# Patient Record
Sex: MALE | Race: WHITE | NOT HISPANIC OR LATINO | Employment: OTHER | URBAN - METROPOLITAN AREA
[De-identification: names, ages, dates, MRNs, and addresses within clinical notes are randomized per-mention and may not be internally consistent; named-entity substitution may affect disease eponyms.]

---

## 2021-05-27 ENCOUNTER — RECORDS - HEALTHEAST (OUTPATIENT)
Dept: ADMINISTRATIVE | Facility: CLINIC | Age: 66
End: 2021-05-27

## 2023-03-20 ENCOUNTER — APPOINTMENT (OUTPATIENT)
Dept: CT IMAGING | Facility: CLINIC | Age: 68
DRG: 178 | End: 2023-03-20
Attending: EMERGENCY MEDICINE
Payer: MEDICARE

## 2023-03-20 ENCOUNTER — HOSPITAL ENCOUNTER (INPATIENT)
Facility: CLINIC | Age: 68
LOS: 18 days | Discharge: NURSING, REHAB OR LONG-TERM CARE | DRG: 178 | End: 2023-04-15
Attending: EMERGENCY MEDICINE | Admitting: INTERNAL MEDICINE
Payer: MEDICARE

## 2023-03-20 DIAGNOSIS — B37.9 CANDIDA INFECTION: ICD-10-CM

## 2023-03-20 DIAGNOSIS — J85.2 ABSCESS OF MIDDLE LOBE OF RIGHT LUNG WITHOUT PNEUMONIA (H): Primary | ICD-10-CM

## 2023-03-20 DIAGNOSIS — R91.8 RIGHT LOWER LOBE LUNG MASS: ICD-10-CM

## 2023-03-20 DIAGNOSIS — M62.81 GENERALIZED MUSCLE WEAKNESS: ICD-10-CM

## 2023-03-20 DIAGNOSIS — L89.159 PRESSURE INJURY OF SKIN OF SACRAL REGION, UNSPECIFIED INJURY STAGE: ICD-10-CM

## 2023-03-20 DIAGNOSIS — K59.00 CONSTIPATION, UNSPECIFIED CONSTIPATION TYPE: ICD-10-CM

## 2023-03-20 DIAGNOSIS — B19.20 HEPATITIS C VIRUS INFECTION WITHOUT HEPATIC COMA, UNSPECIFIED CHRONICITY: ICD-10-CM

## 2023-03-20 DIAGNOSIS — R62.7 FAILURE TO THRIVE IN ADULT: ICD-10-CM

## 2023-03-20 PROBLEM — R47.1 DYSARTHRIA AND ANARTHRIA: Status: ACTIVE | Noted: 2023-03-20

## 2023-03-20 PROBLEM — E55.9 VITAMIN D DEFICIENCY: Status: ACTIVE | Noted: 2023-03-20

## 2023-03-20 PROBLEM — K08.3 RETAINED DENTAL ROOT: Status: ACTIVE | Noted: 2023-03-20

## 2023-03-20 PROBLEM — F19.10 OTHER, MIXED, OR UNSPECIFIED NONDEPENDENT DRUG ABUSE, UNSPECIFIED: Status: ACTIVE | Noted: 2023-03-20

## 2023-03-20 PROBLEM — Z73.6 LIMITATION OF ACTIVITIES DUE TO DISABILITY: Status: ACTIVE | Noted: 2023-03-20

## 2023-03-20 PROBLEM — F32.9 MAJOR DEPRESSIVE DISORDER, SINGLE EPISODE, UNSPECIFIED: Status: ACTIVE | Noted: 2023-03-20

## 2023-03-20 PROBLEM — K02.63 DENTAL CARIES ON SMOOTH SURFACE PENETRATING INTO PULP: Status: ACTIVE | Noted: 2023-03-20

## 2023-03-20 PROBLEM — R26.89 OTHER ABNORMALITIES OF GAIT AND MOBILITY: Status: ACTIVE | Noted: 2023-03-20

## 2023-03-20 PROBLEM — M72.0 PALMAR FASCIAL FIBROMATOSIS (DUPUYTREN): Status: ACTIVE | Noted: 2023-03-20

## 2023-03-20 PROBLEM — I63.9 CEREBRAL INFARCTION, UNSPECIFIED (H): Status: ACTIVE | Noted: 2023-03-20

## 2023-03-20 PROBLEM — Z63.4 DISAPPEARANCE AND DEATH OF FAMILY MEMBER: Status: ACTIVE | Noted: 2023-03-20

## 2023-03-20 PROBLEM — K75.9 HEPATITIS: Status: ACTIVE | Noted: 2023-03-20

## 2023-03-20 PROBLEM — R13.10 DYSPHAGIA, UNSPECIFIED: Status: ACTIVE | Noted: 2023-03-20

## 2023-03-20 PROBLEM — U07.1 COVID-19: Status: ACTIVE | Noted: 2023-03-20

## 2023-03-20 PROBLEM — I69.322 DYSARTHRIA FOLLOWING CEREBRAL INFARCTION: Status: ACTIVE | Noted: 2023-03-20

## 2023-03-20 PROBLEM — R53.1 WEAKNESS: Status: ACTIVE | Noted: 2023-03-20

## 2023-03-20 PROBLEM — Z74.09 OTHER REDUCED MOBILITY: Status: ACTIVE | Noted: 2023-03-20

## 2023-03-20 PROBLEM — F43.23 ADJUSTMENT DISORDER WITH MIXED ANXIETY AND DEPRESSED MOOD: Status: ACTIVE | Noted: 2023-03-20

## 2023-03-20 PROBLEM — F32.A DEPRESSION, UNSPECIFIED: Status: ACTIVE | Noted: 2023-03-20

## 2023-03-20 LAB
ALBUMIN SERPL-MCNC: 3.5 G/DL (ref 3.5–5)
ALP SERPL-CCNC: 106 U/L (ref 45–120)
ALT SERPL W P-5'-P-CCNC: 89 U/L (ref 0–45)
ANION GAP SERPL CALCULATED.3IONS-SCNC: 14 MMOL/L (ref 5–18)
AST SERPL W P-5'-P-CCNC: 64 U/L (ref 0–40)
BASOPHILS # BLD AUTO: 0 10E3/UL (ref 0–0.2)
BASOPHILS NFR BLD AUTO: 0 %
BILIRUB SERPL-MCNC: 1.6 MG/DL (ref 0–1)
BUN SERPL-MCNC: 16 MG/DL (ref 8–22)
CALCIUM SERPL-MCNC: 9.6 MG/DL (ref 8.5–10.5)
CHLORIDE BLD-SCNC: 104 MMOL/L (ref 98–107)
CO2 SERPL-SCNC: 23 MMOL/L (ref 22–31)
CREAT SERPL-MCNC: 0.78 MG/DL (ref 0.7–1.3)
EOSINOPHIL # BLD AUTO: 0 10E3/UL (ref 0–0.7)
EOSINOPHIL NFR BLD AUTO: 0 %
ERYTHROCYTE [DISTWIDTH] IN BLOOD BY AUTOMATED COUNT: 13.4 % (ref 10–15)
GFR SERPL CREATININE-BSD FRML MDRD: >90 ML/MIN/1.73M2
GLUCOSE BLD-MCNC: 108 MG/DL (ref 70–125)
HCT VFR BLD AUTO: 43.7 % (ref 40–53)
HGB BLD-MCNC: 14.6 G/DL (ref 13.3–17.7)
IMM GRANULOCYTES # BLD: 0.1 10E3/UL
IMM GRANULOCYTES NFR BLD: 0 %
LYMPHOCYTES # BLD AUTO: 0.8 10E3/UL (ref 0.8–5.3)
LYMPHOCYTES NFR BLD AUTO: 5 %
MAGNESIUM SERPL-MCNC: 1.6 MG/DL (ref 1.8–2.6)
MCH RBC QN AUTO: 30 PG (ref 26.5–33)
MCHC RBC AUTO-ENTMCNC: 33.4 G/DL (ref 31.5–36.5)
MCV RBC AUTO: 90 FL (ref 78–100)
MONOCYTES # BLD AUTO: 1.2 10E3/UL (ref 0–1.3)
MONOCYTES NFR BLD AUTO: 7 %
NEUTROPHILS # BLD AUTO: 14.2 10E3/UL (ref 1.6–8.3)
NEUTROPHILS NFR BLD AUTO: 88 %
NRBC # BLD AUTO: 0 10E3/UL
NRBC BLD AUTO-RTO: 0 /100
PLATELET # BLD AUTO: 212 10E3/UL (ref 150–450)
POTASSIUM BLD-SCNC: 4 MMOL/L (ref 3.5–5)
PROT SERPL-MCNC: 8.2 G/DL (ref 6–8)
RBC # BLD AUTO: 4.86 10E6/UL (ref 4.4–5.9)
SODIUM SERPL-SCNC: 141 MMOL/L (ref 136–145)
TSH SERPL DL<=0.005 MIU/L-ACNC: 1.59 UIU/ML (ref 0.3–5)
WBC # BLD AUTO: 16.3 10E3/UL (ref 4–11)

## 2023-03-20 PROCEDURE — 96361 HYDRATE IV INFUSION ADD-ON: CPT

## 2023-03-20 PROCEDURE — 258N000003 HC RX IP 258 OP 636: Performed by: PHYSICIAN ASSISTANT

## 2023-03-20 PROCEDURE — 70450 CT HEAD/BRAIN W/O DYE: CPT | Mod: MA

## 2023-03-20 PROCEDURE — G0378 HOSPITAL OBSERVATION PER HR: HCPCS

## 2023-03-20 PROCEDURE — 83735 ASSAY OF MAGNESIUM: CPT | Performed by: PHYSICIAN ASSISTANT

## 2023-03-20 PROCEDURE — 99285 EMERGENCY DEPT VISIT HI MDM: CPT | Mod: 25

## 2023-03-20 PROCEDURE — 36415 COLL VENOUS BLD VENIPUNCTURE: CPT | Performed by: PHYSICIAN ASSISTANT

## 2023-03-20 PROCEDURE — 85025 COMPLETE CBC W/AUTO DIFF WBC: CPT | Performed by: PHYSICIAN ASSISTANT

## 2023-03-20 PROCEDURE — 96360 HYDRATION IV INFUSION INIT: CPT

## 2023-03-20 PROCEDURE — 84443 ASSAY THYROID STIM HORMONE: CPT | Performed by: PHYSICIAN ASSISTANT

## 2023-03-20 PROCEDURE — 80053 COMPREHEN METABOLIC PANEL: CPT | Performed by: PHYSICIAN ASSISTANT

## 2023-03-20 RX ORDER — ERGOCALCIFEROL (VITAMIN D2) 10 MCG
400 TABLET ORAL DAILY
COMMUNITY
End: 2023-06-19 | Stop reason: ALTCHOICE

## 2023-03-20 RX ORDER — LANOLIN ALCOHOL/MO/W.PET/CERES
100 CREAM (GRAM) TOPICAL DAILY
COMMUNITY
End: 2023-06-19 | Stop reason: ALTCHOICE

## 2023-03-20 RX ORDER — LANOLIN ALCOHOL/MO/W.PET/CERES
1 CREAM (GRAM) TOPICAL AT BEDTIME
COMMUNITY
End: 2023-09-17

## 2023-03-20 RX ORDER — ATORVASTATIN CALCIUM 10 MG/1
10 TABLET, FILM COATED ORAL AT BEDTIME
COMMUNITY
End: 2023-09-17

## 2023-03-20 RX ORDER — MIRTAZAPINE 45 MG/1
45 TABLET, FILM COATED ORAL DAILY
COMMUNITY
End: 2023-09-17

## 2023-03-20 RX ORDER — ASPIRIN 81 MG/1
81 TABLET ORAL DAILY
COMMUNITY

## 2023-03-20 RX ORDER — MULTIPLE VITAMINS W/ MINERALS TAB 9MG-400MCG
1 TAB ORAL DAILY
COMMUNITY
End: 2023-06-19 | Stop reason: ALTCHOICE

## 2023-03-20 RX ADMIN — SODIUM CHLORIDE 500 ML: 9 INJECTION, SOLUTION INTRAVENOUS at 17:45

## 2023-03-20 ASSESSMENT — ACTIVITIES OF DAILY LIVING (ADL)
ADLS_ACUITY_SCORE: 33
ADLS_ACUITY_SCORE: 35

## 2023-03-20 NOTE — ED NOTES
Gerry with [facility] adult protection called to report that pt is coming in with daughter who recently took pt out of the [facility] after taking him him out AMA, pt had been there since June 2022, patient has multiple pressure sores to back per Gerry [phone].  Pt has a stroke hx and is unable to effectively make needs known and is considered a vulnerable adult.

## 2023-03-20 NOTE — ED PROVIDER NOTES
EMERGENCY DEPARTMENT ENCOUNTER      NAME: Matheus Nieves  AGE: 68 year old male  YOB: 1955  MRN: [id number]  EVALUATION DATE & TIME: No admission date for patient encounter.    PCP: [facility]    ED PROVIDER: Krzysztof Jones M.D.      Chief Complaint   Patient presents with     Placement          FINAL IMPRESSION:  1.  Vulnerable adult.  2.  Inability to take care of himself or to be taken care of at home.   3.  Sacral decubitus ulcer.      ED COURSE & MEDICAL DECISION MAKIN:34 PM I met with the patient to gather history and to perform my initial exam. We discussed plans for the ED course, including diagnostic testing and treatment. PPE worn: cloth mask.  Patient with a history of alcoholism, previous stroke, hypertension.  [facility] some sort of a long-term placement.  Daughter took him AMA out of their facility 4 days ago citing inadequate care and inability for placement.  [facility]  came to the home today and did not approve of the living situation and notes that the patient is more than the daughter can handle taking care of.  Patient unable to ambulate or transfer, decreased oral intake.  Try to get his wheelchair down the stairs today patient fell and had an abrasion in the top of his head.  No loss conscious, no blood thinners, no pain.  We were called by [facility] about patient with multiple back pressure sores, history of stroke, and vulnerable adult and patient was brought in for placement issues.  Patient himself has no specific complaints.  We will try to get patient care manager involved regarding placement issues but their success is doubtful.  7:40 PM.  Spoke with our DEC .  Patient care manager notes [facility] and probably will need to be admitted there.  However, daughter just pulled [facility] out from the [facility] AGAINST  MEDICAL ADVICE after him boarding there for 9 months without placement.  I suspect that she will refuse other options.  Evaluation still pending.  Care manager notes patient will probably have to board here until disposition is obtained.  She mentions do not admit the patient until after further assessment on their part.  Tentatively, patient will be signed out to the night ED physician at 10 PM.  9:10 PM.  White count elevated 16.3.  Urinalysis still pending.  Chemistries unremarkable and magnesium 1.6.  Bilirubin 1.6.  TSH okay head CT without acute findings.  Patient care manager is involved in trying to get placement anywhere or back to the [facility].  In the meantime the patient will board here until disposition obtained.  Patient signed out to the night ED physician.  10:25 PM.  The night physician notes that it is policy to admit these patients overnight and they can transfer later on to the [facility] if needed.  We have paged the hospitalist.  10:40 PM.  Patient will be admitted to [facility].  Hospitalist in agreement.    Critical care time exclusive of procedures: 30 minutes.    Pertinent Labs & Imaging studies reviewed. (See chart for details)  68 year old male presents to the Emergency Department for evaluation of failure to thrive.     At the conclusion of the encounter I discussed the results of all of the tests and the disposition. The questions were answered. The patient or family acknowledged understanding and was agreeable with the care plan.     Medical Decision Making    History:    Supplemental history from: Family Member/Significant Other    External Record(s) reviewed: Other: Both inpatient and outpatient computer records reviewed.    Work Up:    Chart documentation includes differential considered and any EKGs or imaging interpreted by provider.  Differential diagnosis includes inability to care for self, social placement issues, decubitus ulcers, possible infection, etc.    In additional to work  up documented, I considered the following work up: Documented in chart, if applicable.    External consultation:    Discussion of management with another provider: Documented in chart, if applicable    Complicating factors:    Care impacted by chronic illness: Previous stroke, alcoholism, and pressure sores.    Care affected by social determinants of health: N/A    Disposition considerations: We are looking for nursing home placement.  If failure, patient will need to be admitted.       MEDICATIONS GIVEN IN THE EMERGENCY:  Medications   0.9% sodium chloride BOLUS (500 mLs Intravenous $New Bag 3/20/23 4388)       NEW PRESCRIPTIONS STARTED AT TODAY'S ER VISIT  New Prescriptions    No medications on file          =================================================================    HPI    Patient information was obtained from: Patient's family    Use of : N/A         Matheus JAY Lizbeth is a 68 year old male with a pertinent history of hypertension, cerebral infarction, muscle weakness, and adult failure to thrive who presents to this ED by walk-in for evaluation of failure to thrive.    Per patient's daughter, patient has been in the [facility] since 06/2022 waiting for placement. Patient's daughter states she got frustrated about the patient's care so she signed him out AMA 4 days ago. A [facility]  showed up to their house today where they deemed the daughter unable to take care of his needs. He noted that the patient cannot move at all without help and has pressure sores on his sacrum. They got sent to this ED to find placement.     He does not identify any waxing or waning symptoms otherwise, exacerbating or alleviating features,associated symptoms except as mentioned. He denies any pain related complaints.    REVIEW OF SYSTEMS   Review of Systems   Skin:        Positive for pressure sores on sacrum   All other systems reviewed and are negative.       PAST MEDICAL HISTORY:  No past medical history  "on file.    PAST SURGICAL HISTORY:  No past surgical history on file.        CURRENT MEDICATIONS:    aspirin 325 MG tablet        ALLERGIES:  No Known Allergies    FAMILY HISTORY:  Family History   Problem Relation Age of Onset     Hypertension Mother        SOCIAL HISTORY:   Social History     Socioeconomic History     Marital status: Legally    Tobacco Use     Smoking status: Former     Types: Cigarettes     Quit date: 2014     Years since quittin.6     Smokeless tobacco: Never   Substance and Sexual Activity     Alcohol use: Yes     Alcohol/week: 0.0 standard drinks     Comment: Alcoholic Drinks/day: voldka pint 3 times a week     Drug use: Yes     Frequency: 1.0 times per week     Types: Marijuana       VITALS:  BP (!) 149/105   Pulse 115   Temp 98.8  F (37.1  C) (Temporal)   Resp 16   Ht 1.778 m (5' 10\")   Wt 67.6 kg (149 lb)   SpO2 95%   BMI 21.38 kg/m      PHYSICAL EXAM    Vital Signs:  BP (!) 149/105   Pulse 115   Temp 98.8  F (37.1  C) (Temporal)   Resp 16   Ht 1.778 m (5' 10\")   Wt 67.6 kg (149 lb)   SpO2 95%   BMI 21.38 kg/m    General:  On entering the room the patient is in no apparent distress.    Neck:  Neck supple with full range of motion and nontender.    Back:  Back and spine are nontender.  No costovertebral angle tenderness.    HEENT:  Oropharynx clear with moist mucous membranes.  HEENT unremarkable.    Pulmonary:  Chest clear to auscultation without rhonchi rales or wheezing.    Cardiovascular:  Cardiac regular rate and rhythm without murmurs rubs or gallops.    Abdomen:  Abdomen soft nontender.  There is no rebound or guarding.    Muskuloskeletal:  he moves all 4 without any difficulty and has normal neurovascular exams.  Extremities without clubbing, cyanosis, or edema.  Legs and calves are nontender.    Neuro:  he is alert and oriented ×3 and moves all extremities symmetrically.    Psych:  Normal affect.    Skin:  Unremarkable and warm and dry.  Patient " currently in a wheelchair in the lobby and inability to visualize sacral pressure sores.  Daughter does not know the deepness, extent of these injuries.  If a room opens up and patient transferred to the room we will reattempt visualization.       LAB:  All pertinent labs reviewed and interpreted.  Labs Ordered and Resulted from Time of ED Arrival to Time of ED Departure   COMPREHENSIVE METABOLIC PANEL - Abnormal       Result Value    Sodium 141      Potassium 4.0      Chloride 104      Carbon Dioxide (CO2) 23      Anion Gap 14      Urea Nitrogen 16      Creatinine 0.78      Calcium 9.6      Glucose 108      Alkaline Phosphatase 106      AST 64 (*)     ALT 89 (*)     Protein Total 8.2 (*)     Albumin 3.5      Bilirubin Total 1.6 (*)     GFR Estimate >90     MAGNESIUM - Abnormal    Magnesium 1.6 (*)    CBC WITH PLATELETS AND DIFFERENTIAL - Abnormal    WBC Count 16.3 (*)     RBC Count 4.86      Hemoglobin 14.6      Hematocrit 43.7      MCV 90      MCH 30.0      MCHC 33.4      RDW 13.4      Platelet Count 212      % Neutrophils 88      % Lymphocytes 5      % Monocytes 7      % Eosinophils 0      % Basophils 0      % Immature Granulocytes 0      NRBCs per 100 WBC 0      Absolute Neutrophils 14.2 (*)     Absolute Lymphocytes 0.8      Absolute Monocytes 1.2      Absolute Eosinophils 0.0      Absolute Basophils 0.0      Absolute Immature Granulocytes 0.1      Absolute NRBCs 0.0     TSH WITH FREE T4 REFLEX - Normal    TSH 1.59     ROUTINE UA WITH MICROSCOPIC REFLEX TO CULTURE       RADIOLOGY:  Reviewed all pertinent imaging. Please see official radiology report.  Head CT w/o contrast   Final Result   IMPRESSION:   1.  No CT evidence for acute intracranial process.   2.  Brain atrophy and presumed chronic microvascular ischemic changes as above.               PROCEDURES:   None      I, Amita Balderas, am serving as a scribe to document services personally performed by Dr. Jones based on my observation and the provider's  statements to me. I, Krzysztof Jones MD attest that Amita Amadorkorski is acting in a scribe capacity, has observed my performance of the services and has documented them in accordance with my direction.    Krzysztof Jones M.D.  Emergency Medicine  [facility] EMERGENCY ROOM  [address] [facility] [address]  [phone]  Dept: [phone]     Krzysztof Jones MD  03/20/23 2109       Krzysztof Jones MD  03/20/23 2226       Krzysztof Jones MD  03/20/23 2248

## 2023-03-20 NOTE — ED TRIAGE NOTES
Patient presents to the ED due to need of placement. Patient was at the [facility] since 6/2022 awaiting placement. Patient's daughter states that he was not receiving adequate care and removed patient from hospital 4 days ago. Patient and daughter had visit from [facility]  today who evaluated living situation and indicated patient needs more care then daughter can provider by herself. Patient unable to ambulate or transfer independently. Not eating. Daughter was attempting to get patient down the stairs in the wheelchair this afternoon and patient had a fall. Abrasion to the top of his head. No thinners, no LOC, no pain      Gerry Salinas [phone]     Triage Assessment     Row Name 03/20/23 1650       Triage Assessment (Adult)    Airway WDL WDL       Respiratory WDL    Respiratory WDL WDL       Skin Circulation/Temperature WDL    Skin Circulation/Temperature WDL WDL       Cardiac WDL    Cardiac WDL WDL       Peripheral/Neurovascular WDL    Peripheral Neurovascular WDL WDL       Cognitive/Neuro/Behavioral WDL    Cognitive/Neuro/Behavioral WDL WDL

## 2023-03-21 ENCOUNTER — DOCUMENTATION ONLY (OUTPATIENT)
Dept: OTHER | Facility: CLINIC | Age: 68
End: 2023-03-21
Payer: MEDICARE

## 2023-03-21 ENCOUNTER — APPOINTMENT (OUTPATIENT)
Dept: PHYSICAL THERAPY | Facility: CLINIC | Age: 68
DRG: 178 | End: 2023-03-21
Attending: INTERNAL MEDICINE
Payer: MEDICARE

## 2023-03-21 LAB
ALBUMIN UR-MCNC: 50 MG/DL
APPEARANCE UR: CLEAR
BILIRUB UR QL STRIP: NEGATIVE
COLOR UR AUTO: YELLOW
GLUCOSE UR STRIP-MCNC: NEGATIVE MG/DL
HGB UR QL STRIP: NEGATIVE
KETONES UR STRIP-MCNC: NEGATIVE MG/DL
LEUKOCYTE ESTERASE UR QL STRIP: NEGATIVE
MUCOUS THREADS #/AREA URNS LPF: PRESENT /LPF
NITRATE UR QL: NEGATIVE
PH UR STRIP: 5.5 [PH] (ref 5–7)
RBC URINE: 1 /HPF
SP GR UR STRIP: 1.03 (ref 1–1.03)
UROBILINOGEN UR STRIP-MCNC: 4 MG/DL
WBC URINE: 1 /HPF

## 2023-03-21 PROCEDURE — 97161 PT EVAL LOW COMPLEX 20 MIN: CPT | Mod: GP

## 2023-03-21 PROCEDURE — 250N000013 HC RX MED GY IP 250 OP 250 PS 637: Performed by: INTERNAL MEDICINE

## 2023-03-21 PROCEDURE — 96366 THER/PROPH/DIAG IV INF ADDON: CPT

## 2023-03-21 PROCEDURE — 250N000011 HC RX IP 250 OP 636: Performed by: INTERNAL MEDICINE

## 2023-03-21 PROCEDURE — 99222 1ST HOSP IP/OBS MODERATE 55: CPT | Mod: AI | Performed by: INTERNAL MEDICINE

## 2023-03-21 PROCEDURE — 96365 THER/PROPH/DIAG IV INF INIT: CPT

## 2023-03-21 PROCEDURE — G0378 HOSPITAL OBSERVATION PER HR: HCPCS

## 2023-03-21 PROCEDURE — 258N000001 HC RX 258: Performed by: INTERNAL MEDICINE

## 2023-03-21 PROCEDURE — 96361 HYDRATE IV INFUSION ADD-ON: CPT

## 2023-03-21 PROCEDURE — 81001 URINALYSIS AUTO W/SCOPE: CPT | Performed by: EMERGENCY MEDICINE

## 2023-03-21 RX ORDER — MIRTAZAPINE 15 MG/1
45 TABLET, FILM COATED ORAL AT BEDTIME
Status: DISCONTINUED | OUTPATIENT
Start: 2023-03-21 | End: 2023-04-15 | Stop reason: HOSPADM

## 2023-03-21 RX ORDER — ASPIRIN 81 MG/1
81 TABLET ORAL DAILY
Status: DISCONTINUED | OUTPATIENT
Start: 2023-03-21 | End: 2023-04-15 | Stop reason: HOSPADM

## 2023-03-21 RX ORDER — MAGNESIUM SULFATE HEPTAHYDRATE 40 MG/ML
2 INJECTION, SOLUTION INTRAVENOUS ONCE
Status: COMPLETED | OUTPATIENT
Start: 2023-03-21 | End: 2023-03-21

## 2023-03-21 RX ORDER — ATORVASTATIN CALCIUM 10 MG/1
10 TABLET, FILM COATED ORAL AT BEDTIME
Status: DISCONTINUED | OUTPATIENT
Start: 2023-03-21 | End: 2023-04-15 | Stop reason: HOSPADM

## 2023-03-21 RX ORDER — ONDANSETRON 2 MG/ML
4 INJECTION INTRAMUSCULAR; INTRAVENOUS EVERY 6 HOURS PRN
Status: DISCONTINUED | OUTPATIENT
Start: 2023-03-21 | End: 2023-04-15 | Stop reason: HOSPADM

## 2023-03-21 RX ORDER — ONDANSETRON 4 MG/1
4 TABLET, ORALLY DISINTEGRATING ORAL EVERY 6 HOURS PRN
Status: DISCONTINUED | OUTPATIENT
Start: 2023-03-21 | End: 2023-04-15 | Stop reason: HOSPADM

## 2023-03-21 RX ORDER — ERGOCALCIFEROL (VITAMIN D2) 10 MCG
400 TABLET ORAL DAILY
Status: DISCONTINUED | OUTPATIENT
Start: 2023-03-21 | End: 2023-03-21

## 2023-03-21 RX ORDER — AMOXICILLIN 250 MG
2 CAPSULE ORAL 2 TIMES DAILY PRN
Status: DISCONTINUED | OUTPATIENT
Start: 2023-03-21 | End: 2023-04-15 | Stop reason: HOSPADM

## 2023-03-21 RX ORDER — AMOXICILLIN 250 MG
1 CAPSULE ORAL 2 TIMES DAILY PRN
Status: DISCONTINUED | OUTPATIENT
Start: 2023-03-21 | End: 2023-04-15 | Stop reason: HOSPADM

## 2023-03-21 RX ORDER — MULTIPLE VITAMINS W/ MINERALS TAB 9MG-400MCG
1 TAB ORAL DAILY
Status: DISCONTINUED | OUTPATIENT
Start: 2023-03-21 | End: 2023-04-15 | Stop reason: HOSPADM

## 2023-03-21 RX ORDER — DOCUSATE SODIUM 100 MG/1
100 CAPSULE, LIQUID FILLED ORAL 2 TIMES DAILY
Status: DISCONTINUED | OUTPATIENT
Start: 2023-03-21 | End: 2023-04-15 | Stop reason: HOSPADM

## 2023-03-21 RX ADMIN — ATORVASTATIN CALCIUM 10 MG: 10 TABLET, FILM COATED ORAL at 21:08

## 2023-03-21 RX ADMIN — DOCUSATE SODIUM 100 MG: 100 CAPSULE, LIQUID FILLED ORAL at 11:21

## 2023-03-21 RX ADMIN — MIRTAZAPINE 45 MG: 30 TABLET, FILM COATED ORAL at 21:17

## 2023-03-21 RX ADMIN — DEXTROSE MONOHYDRATE AND SODIUM CHLORIDE: 5; .45 INJECTION, SOLUTION INTRAVENOUS at 21:38

## 2023-03-21 RX ADMIN — MAGNESIUM SULFATE HEPTAHYDRATE 2 G: 40 INJECTION, SOLUTION INTRAVENOUS at 10:31

## 2023-03-21 RX ADMIN — DOCUSATE SODIUM 100 MG: 100 CAPSULE, LIQUID FILLED ORAL at 21:08

## 2023-03-21 RX ADMIN — Medication 100 MG: at 11:17

## 2023-03-21 RX ADMIN — MULTIPLE VITAMINS W/ MINERALS TAB 1 TABLET: TAB at 11:16

## 2023-03-21 RX ADMIN — DEXTROSE MONOHYDRATE AND SODIUM CHLORIDE: 5; .45 INJECTION, SOLUTION INTRAVENOUS at 14:43

## 2023-03-21 RX ADMIN — DEXTROSE AND SODIUM CHLORIDE: 5; 450 INJECTION, SOLUTION INTRAVENOUS at 11:17

## 2023-03-21 RX ADMIN — ASPIRIN 81 MG: 81 TABLET, COATED ORAL at 11:17

## 2023-03-21 ASSESSMENT — ACTIVITIES OF DAILY LIVING (ADL)
ADLS_ACUITY_SCORE: 39
ADLS_ACUITY_SCORE: 35
ADLS_ACUITY_SCORE: 39

## 2023-03-21 NOTE — ED NOTES
Patient seen, stable at this time. Resting in bed as documented. No signs of distress. Will continue to monitor.

## 2023-03-21 NOTE — ED NOTES
Patient seen, stable at this time. No signs of distress. Resting in bed as documented. Checked sacrum for ulcer; looks like potential stage 1. Mepalex on site. Will continue to monitor.

## 2023-03-21 NOTE — H&P
Admission History and Physical   Matheus Nieves,  1955, MRN [id number]    Pressure injury of skin of sacral region, unspecified injury stage [L89.159]    [facility] [address]  [facility] [address], None   Code status:  No Order       Extended Emergency Contact Information  Primary Emergency Contact: Anamaria Nieves  Mobile Phone: [phone]  Relation: Spouse  Secondary Emergency Contact: Amber Zapata  Address: [address] [place] [address] [facility]  Home Phone: [phone]  Relation: Daughter       Assessment and Plan   68-year-old male with medical history significant for hyperlipidemia, hypertension, mood disorder and a documented previous CVA, brought in by the [facility] officials as a vulnerable adult in need of placement, found with    Leukocytosis.  I suspect this is reactive  Hypomagnesemia  Elevated liver enzymes  Hepatitis C antibody (+)  Multiple pressure ulcers unstageable.      Plan  -Admit patient to med/surg in observation.  -Care management consult to explore options for placement.  -Nursing communication, to [facility] from [facility]  -Review and reconcile PTA medications  -Wound care consult for pressure ulcer  -IV fluid therapy.  If no improvement in WBC, will begin active work-up.  -Replete magnesium.  Monitor for electrolytes.  Monitor liver enzymes.  -Consult to palliative care, to assist with goals of therapy/ACP    Disposition: Monitor on med/surg we will follow [facility] for placement.  DVT Prophylaxis: SCDs  CODE STATUS: Unable to obtain from this patient, who is not willing to answer questions.  Orders placed to retrieve patient's healthcare directive from the [facility].  Otherwise patient will be managed as a full code until orders otherwise.     Chief Complaint:  Vulnerable adult, in need of placement     HPI:    Matheus Nieves is a 68 year old old male with past medical history significant for a history of  alcoholism, previous stroke, and hypertension, who [facility], where he was awaiting long-term placement, until about 4-5 days ago, when his stepdaughter decided to check him out AMA on account of inadequate care and inability to place patient.  Patient is a poor historian, and unable to articulate any tangible history.  My documentation of this history is from my interaction with patient's primary bedside RN as well as review of electronic medical records.  History is essentially that  [facility]  came to the home today and did not approve of the living situation and noted that the patients daughter was in capable of taking care of patient.  It appears from documented records in the EMR that patient is unable to ambulate or transfer, had decreased oral intake, and suffered a fall down the stairs sustaining an abrasion on the top of his head.  No loss of consciousness.  [facility] officials called the ED with intention for patient to be placed in a long-term care facility.  Otherwise this patient voiced no complaints to me.  Lab work carried out was notable for (+) hepatitis C antibody, magnesium of 1.6, slightly elevated liver enzymes and total bilirubin, and WBC of 16.3.  UA unrewarding.  Patient also had unrewarding CT head for any acute issues.  Otherwise he denied any fever or chills, cough or sputum production, nausea or vomiting, abdominal pains, chest pain, dyspnea, diarrhea or any other constitutional symptoms.       Medical History  History reviewed. No pertinent past medical history.     Surgical History  He  has no past surgical history on file.       Social History  Reviewed, and he  reports that he quit smoking about 8 years ago. He has never used smokeless tobacco. He reports current alcohol use. He reports current drug use. Frequency: 1.00 time per week. Drug: Marijuana.   Social History     Tobacco Use     Smoking status: Former      "Types: Cigarettes     Quit date: 2014     Years since quittin.6     Smokeless tobacco: Never   Substance Use Topics     Alcohol use: Yes     Alcohol/week: 0.0 standard drinks     Comment: Alcoholic Drinks/day: voldka pint 3 times a week          Allergies  No Known Allergies Family History  Reviewed, and   Family History   Problem Relation Age of Onset     Hypertension Mother              Prior to Admission Medications   (Not in a hospital admission)         Review of Systems:  A 12 point comprehensive review of systems was negative except as noted. Physical Exam:  Temp:  [98.2  F (36.8  C)-98.8  F (37.1  C)] 98.2  F (36.8  C)  Pulse:  [] 61  Resp:  [16-20] 20  BP: ()/() 118/68  SpO2:  [95 %-98 %] 96 %    /68   Pulse 61   Temp 98.2  F (36.8  C) (Oral)   Resp 20   Ht 1.778 m (5' 10\")   Wt 67.6 kg (149 lb)   SpO2 96%   BMI 21.38 kg/m      General Appearance:   Awake, alert and oriented.  No obvious distress   Head:    Normocephalic, without obvious abnormality, atraumatic   Eyes:    PERRL, conjunctiva/corneas clear, EOM's intact,both eyes    Ears:    Normal external ear canals no drainage or erythema bilat.   Nose:   Nares normal by gross inspection,  mucosa normal, no drainage or sinus tenderness   Throat:   Lips, mucosa, and tongue normal; teeth and gums normal   Neck:   Supple, symmetrical, trachea midline, no adenopathy;        thyroid:  No enlargement/tenderness/nodules   Back:     Symmetric, no curvature, ROM normal, no CVA tenderness   Lungs:    Lungs clear to auscultation bilaterally.  No wheezes, rales or rhonchi.   Chest wall:    No tenderness or deformity   Heart:    Regular rate and rhythm, S1 and S2 normal, I/VI systolic murmur, no rubs, no JVD, no edema   Abdomen:     Soft, non-tender, bowel sounds active all four quadrants,     no masses, no hepatosplenomegaly   Musculoskeletal:   Extremities are warm and non-tender, atraumatic, no joint swelling or tenderness "   Pulses:   2+ and symmetric all extremities   Skin:   Skin color, texture, turgor normal, no rashes or lesions on exposed areas, please see nursing assessment for full skin assessment   Neurologic:  Awake, alert and oriented x3.  Moves all extremity symmetrically and spontaneously        Pertinent Labs  Lab Results: personally reviewed.   Recent Labs   Lab 03/20/23  1739      CO2 23   BUN 16   ALBUMIN 3.5   ALKPHOS 106   ALT 89*   AST 64*     Recent Labs   Lab 03/20/23  1739   WBC 16.3*   HGB 14.6   HCT 43.7        No results for input(s): CKTOTAL, TROPONINI in the last 168 hours.    Invalid input(s): TROPONINT, CKMBINDEX    MOST RECENT A1c, Iron, TIBC, Coags, TFTs  No results found for: HGBA1C, INR, PTT  No results found for: IRON  Lab Results   Component Value Date    TSH 1.59 03/20/2023         Pertinent Radiology  Radiology Results: I personally reviewed.  Results for orders placed or performed during the hospital encounter of 03/20/23   Head CT w/o contrast    Impression    IMPRESSION:  1.  No CT evidence for acute intracranial process.  2.  Brain atrophy and presumed chronic microvascular ischemic changes as above.       Advanced Care Planning  Treatment and discharge Planning discussed with patient.  Anticipated Length of Stay in midnights (including a midnight in the Emergency Department after triage if applicable): Less than 2 midnight stays for evaluation and treatment of failure to thrive.    I spent a total of 57 minutes for this encounter with history, physical exam, home medication review and reconciliation.    Maddy Angeles DO  Internal Medicine Hospitalist  3/21/2023

## 2023-03-21 NOTE — CONSULTS
Care Management Follow Up    Length of Stay (days): 0    Expected Discharge Date: 03/22/2023     Concerns to be Addressed:       Patient plan of care discussed at interdisciplinary rounds:     [facility]  Disposition Comments: Final dispo pending.  Anticipated Discharge Services:  (TBD)  Anticipated Discharge DME:  (TBD)    Patient/family educated on Medicare website which has current facility and service quality ratings:    Education Provided on the Discharge Plan:    Patient/Family in Agreement with the Plan:      Referrals Placed by CM/SW:  (CM contacted [facility])  Private pay costs discussed:     Additional Information:  Per patient he is open to returning to the [facility] where he was since June 2022. He was removed AMA from there by his daughter Amber, who then realized she couldn't care for patient at home. Has Adult Protection case open with [facility] handled by Gerry Salinas ([phone]).    CYNDI contacted [facility] and [facility]  ([phone][facility] who stated they have no available patient rooms today but he will contact  and get back with update to this CM.    Explained BANUELOS/Observation Status to patient who verbalized understanding.     1700 -- Call received from  Gerry Salinas ([phone]), [facility] Vulnerable Adult Investigator. Checking in to verify that patient not be discharged to home because he is not safe on his own. Please keep Gerry informed of patient's final discharge destination.      MARLENE MITCHELL, RN/CYNDI

## 2023-03-21 NOTE — PROGRESS NOTES
"Vss other than HR which has been intermittently denisa in the 60's. Disoriented to time. Denies pain. Remains on Mg (1.6) protocols which are a recheck in the AM.     PRIMARY DIAGNOSIS: \"GENERIC\" NURSING  OUTPATIENT/OBSERVATION GOALS TO BE MET BEFORE DISCHARGE:  1. ADLs back to baseline: Yes    2. Activity and level of assistance: Assistive 2 w/ pivoting     3. Pain status: Pain free.    4. Return to near baseline physical activity: Yes (dependant with all ADL's)     Discharge Planner Nurse   Safe discharge environment identified: No  Barriers to discharge: Yes       Entered by: Jacy Thompson RN 03/21/2023 6:46 PM     Please review provider order for any additional goals.   Nurse to notify provider when observation goals have been met and patient is ready for discharge.  "

## 2023-03-21 NOTE — ED NOTES
Patient seen by this RN. Amber Wang, at bedside, along with Care Management RN. Care Management verbalized to this RN that patient stepdaughter Amber removed patient from care facility and was unaware of full extent of care needed to provide at home. Patient stepdaughter now hoping to place patient. Care Management verbalized that Stepdaughter and patient both agree to return to [facility] in AM. Conversation taking place at bedside. Patient AOx4 for Care Management RN as well as this RN. Assessment as documented. Patient resting in bed with no complaints. Speech quiet. No signs of distress. Will continue to monitor.

## 2023-03-21 NOTE — PROGRESS NOTES
03/21/23 7856   Appointment Info   Signing Clinician's Name / Credentials (PT) Andrea Beltran   Living Environment   People in Home alone   [facility]   Transportation Anticipated health plan transportation   Living Environment Comments patient has been at home for past 4 days as [facility] had [facility],  daughter brought him back when she realized the level of care he needed.   Self-Care   Usual Activity Tolerance poor   Current Activity Tolerance poor   Fall history within last six months yes   Number of times patient has fallen within last six months 1   Activity/Exercise/Self-Care Comment per patient he was assist of one for all transfers bed<> wc, states he has not walked for years   General Information   Onset of Illness/Injury or Date of Surgery 03/20/23   Pertinent History of Current Problem (include personal factors and/or comorbidities that impact the POC) Matheus Nieves is a 68 year old old male with past medical history significant for a history of alcoholism, previous stroke, and hypertension, who [facility], where he was awaiting long-term placement, until about 4-5 days ago, when his stepdaughter decided to check him out AMA on account of inadequate care and inability to place patient.  Patient is a poor historian, and unable to articulate any tangible history.   Existing Precautions/Restrictions no known precautions/restrictions   Cognition   Affect/Mental Status (Cognition) unable/difficult to assess;WFL   Follows Commands (Cognition) follows one-step commands   Cognitive Status Comments difficult to understand speech and patient is [place]   Range of Motion (ROM)   Range of Motion ROM is WFL   Strength (Manual Muscle Testing)   Strength Comments general weakness   Bed Mobility   Bed Mobility supine-sit;sit-supine   Supine-Sit [place] (Bed Mobility) minimum assist (75% patient effort)    Sit-Supine [place] (Bed Mobility) minimum assist (75% patient effort)   Transfers   Transfers sit-stand transfer   Sit-Stand Transfer   Sit-Stand [place] (Transfers) minimum assist (75% patient effort)   Assistive Device (Sit-Stand Transfers) walker, front-wheeled   Gait/Stairs (Locomotion)   [place] Level (Gait) minimum assist (75% patient effort)   Assistive Device (Gait) walker, front-wheeled   Distance in Feet 2   Comment, (Gait/Stairs) able to take a few steps to get to chair wiht transfer and to sidestep toward HOB   Balance   Balance Comments decreased balance in sitting noted, needing cga to maintain   Clinical Impression   Criteria for Skilled Therapeutic Intervention Evaluation only   PT Total Evaluation Time   PT Eval, Low Complexity Minutes (47081) 20   PT Discharge Planning   PT Plan dc PT   PT Discharge Recommendation (DC Rec) Long term care facility   PT Rationale for DC Rec Patient is at his functional mobility baseline and has been [facility] .  No further skilled PT needs at this time   PT Brief overview of current status Paitent needing min assist with transfers, sit to stand, and min assist with supine-sit   Total Session Time   Total Session Time (sum of timed and untimed services) 20

## 2023-03-21 NOTE — PROGRESS NOTES
Physical Therapy Discharge Summary    Reason for therapy discharge:    No further expectations of functional progress.    Progress towards therapy goal(s). See goals on Care Plan in [facility] electronic health record for goal details.  No goals set    Therapy recommendation(s):    No further therapy is recommended. Patient at functional mobility baseline.

## 2023-03-21 NOTE — PROGRESS NOTES
"Vss other than HR which has been intermittently denisa in the 60's. Disoriented to time. Denies pain. Remains on Mg (1.6) protocols which are a recheck in the AM.     PRIMARY DIAGNOSIS: \"GENERIC\" NURSING  OUTPATIENT/OBSERVATION GOALS TO BE MET BEFORE DISCHARGE:  1. ADLs back to baseline: Yes (bedbound)    2. Activity and level of assistance: Up with maximum assistance. Consider SW and/or PT evaluation.     3. Pain status: Pain free.    4. Return to near baseline physical activity: Yes (bedbound)     Discharge Planner Nurse   Safe discharge environment identified: No  Barriers to discharge: Yes       Entered by: Jacy Thompson RN 03/21/2023 12:26 PM     Please review provider order for any additional goals.   Nurse to notify provider when observation goals have been met and patient is ready for discharge.  "

## 2023-03-21 NOTE — UTILIZATION REVIEW
Concurrent stay review; Secondary Review Determination - [facility]        Under the authority of the Utilization Management Committee, the utilization review process indicated a secondary review on the above patient.  The review outcome is based on review of the medical records, discussions with staff, and applying clinical experience noted on the date of the review.        (x) Observation/outpatient Status Appropriate - Concurrent stay review       RATIONALE FOR DETERMINATION:   68-year-old male brought to the ED for placement on 3/20/2023.  Past medical history of hypertension, hyperlipidemia, adjustment disorder, cerebral infarction, alcohol abuse, elevated liver function test, COVID-19, dysarthria, dysphagia, hepatitis, depression, abnormalities of gait and mobility, vitamin D deficiency.  Patient was at the [facility] since 6/2022 awaiting placement and was removed by daughter 4 days prior to arrival.  However, [facility]  reported need for higher care than daughter can provide at home.  Patient had a fall when daughter attempted to get him down the stairs in the wheelchair.  Vital signs stable.  Laboratory work-up remarkable for magnesium 1.6, ALT 89, AST 64, total bilirubin 1.6, WBC 16.3.  CT head without evidence for acute intracranial process.  Multiple unstageable pressure ulcers noted, wound care service consulted.    Patient delayed discharge is related to disposition, there is no medical necessity for inpatient admission at the time of this review. If there is a change in patient status, please resend for review.    The information on this document is developed by the utilization review team in order for the business office to ensure compliance.  This only denotes the appropriateness of proper admission status and does not reflect the quality of care rendered.       The definitions of Inpatient Status and Observation Status used in making the  determination above are those provided in the CMS Coverage Manual, Chapter 1 and Chapter 6, section 70.4.       Sincerely,    Jeremiah Sanderson MD

## 2023-03-21 NOTE — PROGRESS NOTES
"Vss other than HR which has been intermittently denisa in the 60's. Disoriented to time. Denies pain. Remains on Mg (1.6) protocols which are a recheck in the AM.     PRIMARY DIAGNOSIS: \"GENERIC\" NURSING  OUTPATIENT/OBSERVATION GOALS TO BE MET BEFORE DISCHARGE:  1. ADLs back to baseline: Yes (bedbound)    2. Activity and level of assistance: Up with maximum assistance. Consider SW and/or PT evaluation.     3. Pain status: Pain free.    4. Return to near baseline physical activity: Yes (bedbound)      Discharge Planner Nurse   Safe discharge environment identified: No (looking for [facility] placement)   Barriers to discharge: Yes       Entered by: Jacy Thompson RN 03/21/2023 10:52 AM     Please review provider order for any additional goals.   Nurse to notify provider when observation goals have been met and patient is ready for discharge.  "

## 2023-03-21 NOTE — CONSULTS
"Care Management Initial Consult    General Information  Assessment completed with: Family, \"deb Clark\" at bedside- \"he has been my dad since he was 3\"  Type of CM/SW Visit: Initial Assessment    Primary Care Provider verified and updated as needed: Yes   Readmission within the last 30 days:  (deb Clark took patient out of [facility] AGAINST MEDICAL ADVICE \"last week, it was on Thursday the 16th I think\".)         Advance Care Planning: Advance Care Planning Reviewed:  (no HCD on file, [facility] stating they will fax copy of HCD they have on file, deb Clark states \"I am the only one who gives a shit\". Stepdaольга Clark states patient is  to her mother Anamaria \"since the 80's\".)        Communication Assessment  Patient's communication style: spoken language (English or Bilingual)        Cognitive  Cognitive/Neuro/Behavioral: patient is selectively responsive to CM- he did not answer any assessment questions but was noted to be active listening to his stepdaughters responses. He was calm and in good control.   He did nod his head \"yes\" when CM asked him if would be agreeable to returning to the [facility].   He did respond \"Amber, my daughter\" when CM asked who was in the room with us.   He did nod \"yes\" when CM asked him if he was .   He did state \"Anamaria\" when CM asked what his wife's name was.   He did not his head \"no\" and used pointer finger to point at his stepdaughter Amber (who was in the room, when CM asked him if it was OK for his wife to make decisions for him). He nodded \"yes\" when to confirm when CM asked him if he was wanting Amber to make decision for him.  He did nod \"yes\" when CM asked if it was OK to talk to his wife or include her in what was going on.  He responded \"2023\" when CM asked what year it was.   He responded \"March\" when CM asked what month it was.   He clearly stated \"St. Seth's day\" when CM asked him what was the last holiday we had.  He " "clearly stated \"Spring\" when CM asked what season it currently was.   He nodded \"yes\" and responded \"[facility]\" when CM asked him where he was at.  He responded \"[employer]\" when CM asked who the  was. He nodded \"yes\" when CM asked him how he felt about that, adding \"I do not like Trump\".   He was able to tell CM that he had \"Keri\" a daughter of his own who passed away \"a few years ago\". CM noted patient to have tear in his eye when CM asked him if that was hard for him- he nodded \"yes\". CM asked patient if he has felt depressed since she passed and he responded by saying \"yes\".       Living Environment:   People in home:  (had been living with deb Clark for 8 years before going into [facility] in June 2022.)     Current living Arrangements: had been living with deb Clark until going into [facility] in June 2022        Able to return to prior arrangements:  (TBD)       Family/Social Support:  Care provided by:    Provides care for:    Marital Status:              Description of Support System: wife Anamaria, stepdanavarro Clark and Tammy. No living children. Has a sister Elisabeth.         Current Resources:   Patient receiving home care services: No  Community Resources:  ([facility], applying for MA with [facility])  Equipment currently used at home:    Supplies currently used at home:      Employment/Financial:  Employment Status:       Employment/ Comments: [facility]  Financial Concerns:     Referral to Financial Worker: Yes (deb reports in process of applying for MA with [facility])     Lifestyle & Psychosocial Needs:  Social Determinants of Health     Tobacco Use: Medium Risk     Smoking Tobacco Use: Former     Smokeless Tobacco Use: Never     Passive Exposure: Not on file   Alcohol Use: Not on file   Financial Resource Strain: Not on file   Food Insecurity: Not on file   Transportation Needs: Not on " "file   Physical Activity: Not on file   Stress: Not on file   Social Connections: Not on file   Intimate Partner Violence: Not on file   Depression: Not on file   Housing Stability: Not on file       Functional Status:  Prior to admission patient needed assistance:   Dependent ADLs::  (has been in the [facility] since June 2022)  Dependent IADLs::  (has been in the [facility] since June 2022)     He has not driven in 30 years due to DWI.   He worked construction.  He served in [employer] and is [facility] connected.     Mental Health Status:  Mental Health Status:  (stepdaughter reports hx of depression)       Chemical Dependency Status:  Chemical Dependency Status:  (stepdaughter reports hx of alcohol and marijuana)  Hx of DWI's.         Values/Beliefs:  Spiritual, Cultural Beliefs, Sikh Practices, Values that affect care: no               Additional Information:    Charge RN and MD request  assistance in finding placement for this patient.   Chart reviewed.     CM left  with Gerry Salinas [phone] ( indicates [facility]) requesting return call to .     CM updated CM supervisor and ANS regarding this situation.     8:20 PM  CM spoke to daughter Amber and states \"they would not give him a blanket when I asked so I took him out of there, they won't let him use his phone, they are trying to get him into sober living\". Daughter states \"I didn't think it through, honestly, I should have just sat down and thought more about it, that is why the SW came out to my house today because the [facility] called after I took him out without permission...... I thought I could take care of him, I thought he would be able to walk... I can't remember, I am sorry\".   Daughter Amber states patient had been living with her for \"about 8 years and then he had a stroke and it kept getting harder and harder\" up until June of 2022 at which time [facility] the [facility]- \"he kept getting depressed, he " "was not able to walk, he wanted to go to the hospital so I called the paramedics and they brought him to the [facility]\".   Daughter Amber states \"the VA was wanting me to sign documents and find his bank statements and stuff they needed to verify how much money he gets from social security and stuff but I didn't get it to them in time..... last week I finally got the stuff to [facility] to see about getting him MA\".   Daughter Amber states patient is \" but  ....... my mom don't give a shit, sorry for my language but she does not want anything to do with him unless he gives her money...... he told the hospital that I am like the wife daughter basically..... they both moved in with me about 8 years ago but I told my mom she had to go after a week or two tops and she has been living at my sisters since\".   Francisco Clark later states that both her and her sister Tammy are actually patients stepchildren. That he does not have any living children of his own- \"they had one daughter together but she  two years ago....... she was only 44 years old.... she had a lot going on with her drinking and what not, maybe it was pancreatic cancer but they said natural causes\".    Stepdaughter Amber states she would be agreeable to patient going back to the [facility].     CYNDI sent email to [facility] request ACP review to confirm if HCD or legal guardianship paperwork on file.     CYNDI spoke to Clara,  at [facility] ([phone]). She is going to look further into records and return call to . She will look to see if they have HCD on file.     9:14 PM  CYNDI received return call from Clara,  at [facility]. She states they are not able to take patient back tonight but possible in the morning. She confirmed CYNDI does not need to call the VA referral line ([phone]) to notify of patient being in ER (per standard protocol) as she has documented this. She will fax CM a copy of HCD they " "have on file (CM provided fax number of [phone] ER pharmacy fax machine). She confirms VA will call back tomorrow morning (CM provided KENDRICK phone number [phone] and direct ER phone number [phone]).     CM spoke to Caity Harp, on call crisis worker with [facility] ([phone]). She was able to confirm that pending allegations are against daughter Amber Zapata (\"neglect\") and not against the [facility]. She noted no urgent concern for patient safety with daughter Amber visiting at the hospital but would not want patient discharged back into her care at this time. She noted no concerns with patient being transferred to [facility]. She is not able to confirm who is decision maker for patient at this time. She will have alexx Garrett follow up with [facility] ER/ tomorrow (CM provided KENDRICK phone number [phone] and direct ER phone number [phone]).     Kathleen Amber confirms phone of [phone]  Hugoizzy Clark confirms patient wife Anamaria can be reached at [phone]  Kathleen Clark did dial wife Anamaria on speakerphone while CM was in room. CM attempted to ask Anamaria to confirm if they were  and she said \"yes\" and CM asked who has been making decisions for patient, wife Anamaria stated \"you answer that, I should be but my daughter has been able to and she is the one who took him out of there\". Stepdaughter and wife had short verbal altercation with each other and then wife hung up the phone.     CM updated MD.   CM updated beside RN and Charge RN.  CM updated ANS.   CYNDI updated SW supervisor.     Franny Nieto RN            "

## 2023-03-21 NOTE — PHARMACY-ADMISSION MEDICATION HISTORY
Pharmacy Note - Admission Medication History    Pertinent Provider Information: all information collected here was through the [facility] records only - did display last refill of 3/15/23 for all below medications.     ______________________________________________________________________    Prior To Admission (PTA) med list completed and updated in EMR.       PTA Med List   Medication Sig Last Dose     aspirin 81 MG EC tablet Take 81 mg by mouth daily Unknown     atorvastatin (LIPITOR) 10 MG tablet Take 10 mg by mouth At Bedtime Unknown     melatonin 3 MG tablet Take 3 mg by mouth nightly as needed for sleep Unknown     mirtazapine (REMERON) 45 MG tablet Take 45 mg by mouth At Bedtime Unknown     multivitamin w/minerals (THERA-VIT-M) tablet Take 1 tablet by mouth daily Unknown     thiamine (B-1) 100 MG tablet Take 100 mg by mouth daily Unknown     Vitamin D, Cholecalciferol, 10 MCG (400 UNIT) TABS Take 400 Units by mouth daily Unknown       Information source(s): Clinic records, patient not responding and emergency contact did not answer phone.    Method of interview communication: N/A    Summary of Changes to PTA Med List  All medications are new.    Patient was asked about OTC/herbal products specifically.  PTA med list reflects this.    In the past week, patient estimated taking medication this percent of the time: n/a    Medication Affordability:       Allergies were reviewed, assessed, and updated with the patient.      Patient does not use any multi-dose medications prior to admission.    The information provided in this note is only as accurate as the sources available at the time of the update(s).    Thank you for the opportunity to participate in the care of this patient.    Stepan De La Fuente RPH  3/20/2023 10:49 PM

## 2023-03-21 NOTE — CONSULTS
"Palliative Care: Brief/Limited Consult Note:    Palliative care team received order for palliative consult for \"advanced care plan.\"    Chart reviewed and discussed with ordering provider, Dr Angeles of [facility] medicine service.  Dr Angeles noted the patient is not a reliable historian and surrogate is not currently identified.    Chart reviewed further, noted that Mr. Nieves was hospitalized at [facility] for protracted period of time, his step daughter Amber took him from the [facility] to her home AMA and subsequently a vulnerable adult case was filed with [facility].      Mr. Nieves presented via EMS when the [facility]  found the patient to be in an unsafe situation, needing placement and was brought to [facility].     Plan:  Chart reviewed at length and CM working to transfer Mr. Nieves to [facility] (no bed today).  - Palliative is unable to have goals of care conversation when a patient is nondecisional and the step daughter's decisional capacity on behalf of the patient is under question given the vulnerable adult report. Also unable to do advanced care planning with a nondecisional patient and no surrogate.  - Palliative consult deferred for this reason  - Additionally, placed order to request staff obtain [facility] records for health care directive (there are two listed on Care Everywhere records listed on [facility]) and discharge summary.    Discussed with Dr Angeles.    Angelita Cutler MD  [facility] Palliative Medicine Service: [facility]  Department voice mail (checked M-F 8a-4pm approx): [phone]  [facility] Palliative Medicine pager: [phone]  (Please use AMION for text paging if possible, use link under Palliative service)  Or may securely message me via Vocera Web Console        "

## 2023-03-21 NOTE — PROGRESS NOTES
OT: Orders received. Chart reviewed and discussed with care team. OT not indicated due to pt being dependent w/ ADLs at baseline and currently waiting for LTC placement. Will complete orders.

## 2023-03-22 LAB
ALBUMIN SERPL-MCNC: 2.7 G/DL (ref 3.5–5)
ALP SERPL-CCNC: 77 U/L (ref 45–120)
ALT SERPL W P-5'-P-CCNC: 69 U/L (ref 0–45)
ANION GAP SERPL CALCULATED.3IONS-SCNC: 7 MMOL/L (ref 5–18)
AST SERPL W P-5'-P-CCNC: 62 U/L (ref 0–40)
BASOPHILS # BLD AUTO: 0 10E3/UL (ref 0–0.2)
BASOPHILS NFR BLD AUTO: 0 %
BILIRUB SERPL-MCNC: 0.8 MG/DL (ref 0–1)
BUN SERPL-MCNC: 16 MG/DL (ref 8–22)
CALCIUM SERPL-MCNC: 8.3 MG/DL (ref 8.5–10.5)
CHLORIDE BLD-SCNC: 111 MMOL/L (ref 98–107)
CO2 SERPL-SCNC: 22 MMOL/L (ref 22–31)
CREAT SERPL-MCNC: 0.74 MG/DL (ref 0.7–1.3)
EOSINOPHIL # BLD AUTO: 0.3 10E3/UL (ref 0–0.7)
EOSINOPHIL NFR BLD AUTO: 4 %
ERYTHROCYTE [DISTWIDTH] IN BLOOD BY AUTOMATED COUNT: 13.3 % (ref 10–15)
GFR SERPL CREATININE-BSD FRML MDRD: >90 ML/MIN/1.73M2
GLUCOSE BLD-MCNC: 91 MG/DL (ref 70–125)
HCT VFR BLD AUTO: 37 % (ref 40–53)
HGB BLD-MCNC: 12.6 G/DL (ref 13.3–17.7)
IMM GRANULOCYTES # BLD: 0 10E3/UL
IMM GRANULOCYTES NFR BLD: 0 %
LYMPHOCYTES # BLD AUTO: 2.1 10E3/UL (ref 0.8–5.3)
LYMPHOCYTES NFR BLD AUTO: 24 %
MAGNESIUM SERPL-MCNC: 1.6 MG/DL (ref 1.8–2.6)
MCH RBC QN AUTO: 30.5 PG (ref 26.5–33)
MCHC RBC AUTO-ENTMCNC: 34.1 G/DL (ref 31.5–36.5)
MCV RBC AUTO: 90 FL (ref 78–100)
MONOCYTES # BLD AUTO: 1.1 10E3/UL (ref 0–1.3)
MONOCYTES NFR BLD AUTO: 12 %
NEUTROPHILS # BLD AUTO: 5.3 10E3/UL (ref 1.6–8.3)
NEUTROPHILS NFR BLD AUTO: 60 %
NRBC # BLD AUTO: 0 10E3/UL
NRBC BLD AUTO-RTO: 0 /100
PLATELET # BLD AUTO: 171 10E3/UL (ref 150–450)
POTASSIUM BLD-SCNC: 3.6 MMOL/L (ref 3.5–5)
PROT SERPL-MCNC: 6.4 G/DL (ref 6–8)
RBC # BLD AUTO: 4.13 10E6/UL (ref 4.4–5.9)
SODIUM SERPL-SCNC: 140 MMOL/L (ref 136–145)
WBC # BLD AUTO: 8.9 10E3/UL (ref 4–11)

## 2023-03-22 PROCEDURE — G0378 HOSPITAL OBSERVATION PER HR: HCPCS

## 2023-03-22 PROCEDURE — 96376 TX/PRO/DX INJ SAME DRUG ADON: CPT

## 2023-03-22 PROCEDURE — 99232 SBSQ HOSP IP/OBS MODERATE 35: CPT | Performed by: INTERNAL MEDICINE

## 2023-03-22 PROCEDURE — 80053 COMPREHEN METABOLIC PANEL: CPT | Performed by: INTERNAL MEDICINE

## 2023-03-22 PROCEDURE — 83735 ASSAY OF MAGNESIUM: CPT | Performed by: INTERNAL MEDICINE

## 2023-03-22 PROCEDURE — 85025 COMPLETE CBC W/AUTO DIFF WBC: CPT | Performed by: INTERNAL MEDICINE

## 2023-03-22 PROCEDURE — 250N000011 HC RX IP 250 OP 636: Performed by: INTERNAL MEDICINE

## 2023-03-22 PROCEDURE — 250N000013 HC RX MED GY IP 250 OP 250 PS 637: Performed by: INTERNAL MEDICINE

## 2023-03-22 PROCEDURE — 36415 COLL VENOUS BLD VENIPUNCTURE: CPT | Performed by: INTERNAL MEDICINE

## 2023-03-22 RX ORDER — MAGNESIUM SULFATE HEPTAHYDRATE 40 MG/ML
2 INJECTION, SOLUTION INTRAVENOUS ONCE
Status: COMPLETED | OUTPATIENT
Start: 2023-03-22 | End: 2023-03-22

## 2023-03-22 RX ADMIN — MIRTAZAPINE 45 MG: 30 TABLET, FILM COATED ORAL at 21:10

## 2023-03-22 RX ADMIN — Medication 100 MG: at 08:43

## 2023-03-22 RX ADMIN — DOCUSATE SODIUM 100 MG: 100 CAPSULE, LIQUID FILLED ORAL at 20:00

## 2023-03-22 RX ADMIN — CHOLECALCIFEROL TAB 10 MCG (400 UNIT) 10 MCG: 10 TAB at 08:43

## 2023-03-22 RX ADMIN — MULTIPLE VITAMINS W/ MINERALS TAB 1 TABLET: TAB at 08:43

## 2023-03-22 RX ADMIN — ASPIRIN 81 MG: 81 TABLET, COATED ORAL at 08:43

## 2023-03-22 RX ADMIN — ATORVASTATIN CALCIUM 10 MG: 10 TABLET, FILM COATED ORAL at 21:10

## 2023-03-22 RX ADMIN — DOCUSATE SODIUM 100 MG: 100 CAPSULE, LIQUID FILLED ORAL at 08:43

## 2023-03-22 RX ADMIN — MAGNESIUM SULFATE HEPTAHYDRATE 2 G: 40 INJECTION, SOLUTION INTRAVENOUS at 18:56

## 2023-03-22 ASSESSMENT — ACTIVITIES OF DAILY LIVING (ADL)
ADLS_ACUITY_SCORE: 49
ADLS_ACUITY_SCORE: 47
ADLS_ACUITY_SCORE: 49
ADLS_ACUITY_SCORE: 39
ADLS_ACUITY_SCORE: 49
ADLS_ACUITY_SCORE: 49
ADLS_ACUITY_SCORE: 47
ADLS_ACUITY_SCORE: 47
ADLS_ACUITY_SCORE: 49
ADLS_ACUITY_SCORE: 49

## 2023-03-22 NOTE — ED NOTES
PRIMARY DIAGNOSIS: GENERALIZED WEAKNESS    OUTPATIENT/OBSERVATION GOALS TO BE MET BEFORE DISCHARGE  1. Orthostatic performed: N/A    2. Tolerating PO medications: Yes    3. Return to near baseline physical activity: Yes - bedrest , pt unable to ambulate - baseline     4. Cleared for discharge by consultants (if involved): Yes    Discharge Planner Nurse   Safe discharge environment identified: No , needing placement    Barriers to discharge: Yes , needing placement         Entered by: Carrie Stringer RN 03/22/2023 12:09 PM     Please review provider order for any additional goals.   Nurse to notify provider when observation goals have been met and patient is ready for discharge.

## 2023-03-22 NOTE — PROGRESS NOTES
Hospitalist Progress Note    Assessment/Plan  68-year-old male with medical history significant for hyperlipidemia, hypertension, mood disorder and a documented previous CVA, brought in by the [facility] officials as a vulnerable adult in need of placement, found with     Leukocytosis, POA.    Resolved.  Hypomagnesemia  Elevated liver enzymes  Hepatitis C antibody (+)  Multiple pressure ulcers unstageable.        Plan  -Patient medically stable for transfer to next site of care.  Still awaiting safe discharge/placement.  -[facility] to [facility]  -Continue PTA medications  -Wound care consult for pressure ulcer  -I will discontinue IV fluid therapy.    WBC WNL.  Patient tolerating orally..  -Continue to replete electrolytes as needed   -PT/OT records reviewed: Please refer to notes of 3/21.  -Monitor for electrolytes.  Monitor liver enzymes.       Disposition: Awaiting safe discharge plans for.  DVT Prophylaxis: SCDs  At this time I am unable to obtain patient's CODE STATUS, given his mental acuity.  Patient care [facility].  Discussed with patient's primary bedside RN(Carrie) today.  Patient will be managed as a full code, pending any orders otherwise[facility].    Subjective  Denies any new complaints.  No acute events overnight.  Awaiting safe discharge plans.    Objective    Vital signs in last 24 hours  Temp:  [98.1  F (36.7  C)-99.2  F (37.3  C)] 98.8  F (37.1  C)  Pulse:  [61-82] 75  Resp:  [18-22] 18  BP: (113-139)/(68-83) 139/83  SpO2:  [93 %-97 %] 95 % @LASTSAO2(12)@ O2 Device: None (Room air)    Weight:   Wt Readings from Last 3 Encounters:   03/20/23 67.6 kg (149 lb)      Weight change:     Intake/Output last 3 shifts  I/O last 3 completed shifts:  In: 360 [P.O.:360]  Out: 400 [Urine:400]  Body mass index is 21.38 kg/m .    Physical Exam    General Appearance:    Alert, cooperative, no distress, appears  stated age   Lungs:     Clear bilaterally    Cardiovascular:    Regular rate ands rhythm.  Normal S1, S2.  No murmur, rub or gallop.  No edema   Abdomen:     Soft, non-tender, bowel sounds active all four quadrants,     no masses, no organomegaly   Neurologic:   Awake, alert, oriented x 3.  Grossly nonfocal      Pertinent Labs   Lab Results: personally reviewed.   Recent Labs   Lab 03/22/23  0741 03/20/23  1739    141   CO2 22 23   BUN 16 16   ALBUMIN 2.7* 3.5   ALKPHOS 77 106   ALT 69* 89*   AST 62* 64*     Recent Labs   Lab 03/22/23  0741 03/20/23  1739   WBC 8.9 16.3*   HGB 12.6* 14.6   HCT 37.0* 43.7    212     No results for input(s): CKTOTAL, TROPONINI in the last 168 hours.    Invalid input(s): TROPONINT, CKMBINDEX  Invalid input(s): POCGLUFGR    Medications  Current Facility-Administered Medications   Medication     aspirin EC tablet 81 mg     atorvastatin (LIPITOR) tablet 10 mg     cholecalciferol (VITAMIN D3) 10 mcg (400 units) tablet 10 mcg     docusate sodium (COLACE) capsule 100 mg     melatonin tablet 1 mg     mirtazapine (REMERON) tablet 45 mg     multivitamin w/minerals (THERA-VIT-M) tablet 1 tablet     ondansetron (ZOFRAN ODT) ODT tab 4 mg    Or     ondansetron (ZOFRAN) injection 4 mg     senna-docusate (SENOKOT-S/PERICOLACE) 8.6-50 MG per tablet 1 tablet    Or     senna-docusate (SENOKOT-S/PERICOLACE) 8.6-50 MG per tablet 2 tablet     thiamine (B-1) tablet 100 mg     Current Outpatient Medications   Medication     aspirin 81 MG EC tablet     atorvastatin (LIPITOR) 10 MG tablet     melatonin 3 MG tablet     mirtazapine (REMERON) 45 MG tablet     multivitamin w/minerals (THERA-VIT-M) tablet     thiamine (B-1) 100 MG tablet     Vitamin D, Cholecalciferol, 10 MCG (400 UNIT) TABS       Pertinent Radiology   Radiology Results: Personally reviewed   Results for orders placed or performed during the hospital encounter of 03/20/23   Head CT w/o contrast    Impression    IMPRESSION:  1.  No CT  evidence for acute intracranial process.  2.  Brain atrophy and presumed chronic microvascular ischemic changes as above.       I spent a total of 39 minutes for this encounter with interval history, physical exam, in patient medication review and reconciliation.      Maddy Angeles DO  Internal Medicine Hospitalist  3/22/2023

## 2023-03-22 NOTE — PROGRESS NOTES
PRIMARY DIAGNOSIS: GENERALIZED WEAKNESS    OUTPATIENT/OBSERVATION GOALS TO BE MET BEFORE DISCHARGE  1. Orthostatic performed: N/A    2. Tolerating PO medications: Yes    3. Return to near baseline physical activity: Yes    4. Cleared for discharge by consultants (if involved): No    Discharge Planner Nurse   Safe discharge environment identified: No  Barriers to discharge: Yes       Entered by: Joe Love RN 03/21/2023 11:04 PM     Please review provider order for any additional goals.   Nurse to notify provider when observation goals have been met and patient is ready for discharge.

## 2023-03-22 NOTE — PROGRESS NOTES
PRIMARY DIAGNOSIS: GENERALIZED WEAKNESS    OUTPATIENT/OBSERVATION GOALS TO BE MET BEFORE DISCHARGE  1. Orthostatic performed: N/A    2. Tolerating PO medications: Yes    3. Return to near baseline physical activity: Yes    4. Cleared for discharge by consultants (if involved): No    Discharge Planner Nurse   Safe discharge environment identified: No  Barriers to discharge: Yes       Entered by: Joe Love RN 03/22/2023 1:32 AM     Please review provider order for any additional goals.   Nurse to notify provider when observation goals have been met and patient is ready for discharge.

## 2023-03-22 NOTE — PROGRESS NOTES
PRIMARY DIAGNOSIS: GENERALIZED WEAKNESS    OUTPATIENT/OBSERVATION GOALS TO BE MET BEFORE DISCHARGE  1. Orthostatic performed: N/A    2. Tolerating PO medications: Yes    3. Return to near baseline physical activity: Yes    4. Cleared for discharge by consultants (if involved): No    Discharge Planner Nurse   Safe discharge environment identified: No  Barriers to discharge: Yes       Entered by: Joe Love RN 03/22/2023 4:28 AM   VSS. Disoriented to time. Denies pain. Male external catheter draining orange colored urine. Patient turned q2h throughout the night to promote skin integrity. Remains on Mg (1.6) protocols, to be rechecked at 0600.  Please review provider order for any additional goals.   Nurse to notify provider when observation goals have been met and patient is ready for discharge.

## 2023-03-23 ENCOUNTER — APPOINTMENT (OUTPATIENT)
Dept: RADIOLOGY | Facility: CLINIC | Age: 68
DRG: 178 | End: 2023-03-23
Attending: HOSPITALIST
Payer: MEDICARE

## 2023-03-23 LAB
ANION GAP SERPL CALCULATED.3IONS-SCNC: 6 MMOL/L (ref 5–18)
BASOPHILS # BLD AUTO: 0 10E3/UL (ref 0–0.2)
BASOPHILS NFR BLD AUTO: 0 %
BUN SERPL-MCNC: 17 MG/DL (ref 8–22)
CALCIUM SERPL-MCNC: 8.4 MG/DL (ref 8.5–10.5)
CHLORIDE BLD-SCNC: 110 MMOL/L (ref 98–107)
CO2 SERPL-SCNC: 23 MMOL/L (ref 22–31)
CREAT SERPL-MCNC: 0.75 MG/DL (ref 0.7–1.3)
EOSINOPHIL # BLD AUTO: 0.5 10E3/UL (ref 0–0.7)
EOSINOPHIL NFR BLD AUTO: 5 %
ERYTHROCYTE [DISTWIDTH] IN BLOOD BY AUTOMATED COUNT: 13.4 % (ref 10–15)
GFR SERPL CREATININE-BSD FRML MDRD: >90 ML/MIN/1.73M2
GLUCOSE BLD-MCNC: 95 MG/DL (ref 70–125)
HCT VFR BLD AUTO: 39.4 % (ref 40–53)
HGB BLD-MCNC: 12.9 G/DL (ref 13.3–17.7)
IMM GRANULOCYTES # BLD: 0 10E3/UL
IMM GRANULOCYTES NFR BLD: 0 %
LYMPHOCYTES # BLD AUTO: 2.3 10E3/UL (ref 0.8–5.3)
LYMPHOCYTES NFR BLD AUTO: 24 %
MAGNESIUM SERPL-MCNC: 1.9 MG/DL (ref 1.8–2.6)
MCH RBC QN AUTO: 30.6 PG (ref 26.5–33)
MCHC RBC AUTO-ENTMCNC: 32.7 G/DL (ref 31.5–36.5)
MCV RBC AUTO: 94 FL (ref 78–100)
MONOCYTES # BLD AUTO: 1 10E3/UL (ref 0–1.3)
MONOCYTES NFR BLD AUTO: 11 %
NEUTROPHILS # BLD AUTO: 5.7 10E3/UL (ref 1.6–8.3)
NEUTROPHILS NFR BLD AUTO: 60 %
NRBC # BLD AUTO: 0 10E3/UL
NRBC BLD AUTO-RTO: 0 /100
PLATELET # BLD AUTO: 181 10E3/UL (ref 150–450)
POTASSIUM BLD-SCNC: 3.9 MMOL/L (ref 3.5–5)
RBC # BLD AUTO: 4.21 10E6/UL (ref 4.4–5.9)
SODIUM SERPL-SCNC: 139 MMOL/L (ref 136–145)
WBC # BLD AUTO: 9.6 10E3/UL (ref 4–11)

## 2023-03-23 PROCEDURE — 83735 ASSAY OF MAGNESIUM: CPT | Performed by: INTERNAL MEDICINE

## 2023-03-23 PROCEDURE — 87077 CULTURE AEROBIC IDENTIFY: CPT | Performed by: HOSPITALIST

## 2023-03-23 PROCEDURE — 36415 COLL VENOUS BLD VENIPUNCTURE: CPT | Performed by: HOSPITALIST

## 2023-03-23 PROCEDURE — G0378 HOSPITAL OBSERVATION PER HR: HCPCS

## 2023-03-23 PROCEDURE — 36415 COLL VENOUS BLD VENIPUNCTURE: CPT | Performed by: INTERNAL MEDICINE

## 2023-03-23 PROCEDURE — 250N000013 HC RX MED GY IP 250 OP 250 PS 637: Performed by: HOSPITALIST

## 2023-03-23 PROCEDURE — 80048 BASIC METABOLIC PNL TOTAL CA: CPT | Performed by: INTERNAL MEDICINE

## 2023-03-23 PROCEDURE — 71045 X-RAY EXAM CHEST 1 VIEW: CPT

## 2023-03-23 PROCEDURE — 85025 COMPLETE CBC W/AUTO DIFF WBC: CPT | Performed by: INTERNAL MEDICINE

## 2023-03-23 PROCEDURE — 87149 DNA/RNA DIRECT PROBE: CPT | Performed by: HOSPITALIST

## 2023-03-23 PROCEDURE — 99231 SBSQ HOSP IP/OBS SF/LOW 25: CPT | Performed by: INTERNAL MEDICINE

## 2023-03-23 PROCEDURE — 250N000013 HC RX MED GY IP 250 OP 250 PS 637: Performed by: INTERNAL MEDICINE

## 2023-03-23 RX ORDER — ACETAMINOPHEN 325 MG/1
650 TABLET ORAL EVERY 4 HOURS PRN
Status: DISCONTINUED | OUTPATIENT
Start: 2023-03-23 | End: 2023-04-15 | Stop reason: HOSPADM

## 2023-03-23 RX ADMIN — MULTIPLE VITAMINS W/ MINERALS TAB 1 TABLET: TAB at 08:35

## 2023-03-23 RX ADMIN — ASPIRIN 81 MG: 81 TABLET, COATED ORAL at 08:35

## 2023-03-23 RX ADMIN — MIRTAZAPINE 45 MG: 30 TABLET, FILM COATED ORAL at 21:33

## 2023-03-23 RX ADMIN — ACETAMINOPHEN 650 MG: 325 TABLET ORAL at 21:33

## 2023-03-23 RX ADMIN — DOCUSATE SODIUM 100 MG: 100 CAPSULE, LIQUID FILLED ORAL at 21:33

## 2023-03-23 RX ADMIN — DOCUSATE SODIUM 100 MG: 100 CAPSULE, LIQUID FILLED ORAL at 08:35

## 2023-03-23 RX ADMIN — Medication 100 MG: at 08:35

## 2023-03-23 RX ADMIN — ATORVASTATIN CALCIUM 10 MG: 10 TABLET, FILM COATED ORAL at 21:33

## 2023-03-23 RX ADMIN — CHOLECALCIFEROL TAB 10 MCG (400 UNIT) 10 MCG: 10 TAB at 08:35

## 2023-03-23 ASSESSMENT — ACTIVITIES OF DAILY LIVING (ADL)
ADLS_ACUITY_SCORE: 47
ADLS_ACUITY_SCORE: 47
ADLS_ACUITY_SCORE: 49
ADLS_ACUITY_SCORE: 49
ADLS_ACUITY_SCORE: 47
ADLS_ACUITY_SCORE: 49
ADLS_ACUITY_SCORE: 47

## 2023-03-23 NOTE — PROGRESS NOTES
PRIMARY DIAGNOSIS: GENERALIZED WEAKNESS    OUTPATIENT/OBSERVATION GOALS TO BE MET BEFORE DISCHARGE  1. Orthostatic performed: No    2. Tolerating PO medications: Yes    3. Return to near baseline physical activity: No    4. Cleared for discharge by consultants (if involved): No    Discharge Planner Nurse   Safe discharge environment identified: No  Barriers to discharge: Yes       Entered by: Megha Kemp RN 03/22/2023 8:28 PM     VSS. A&Ox2. Denies pain. External catheter draining. Bottom intact besides small reddnened area/abrasion. Repositioned q2h. Mg replacement complete, recheck in AM. Awaiting TCU.

## 2023-03-23 NOTE — PROGRESS NOTES
PRIMARY DIAGNOSIS: GENERALIZED WEAKNESS    OUTPATIENT/OBSERVATION GOALS TO BE MET BEFORE DISCHARGE  1. Orthostatic performed: No    2. Tolerating PO medications: Yes    3. Return to near baseline physical activity: No    4. Cleared for discharge by consultants (if involved): No    Discharge Planner Nurse   Safe discharge environment identified: No  Barriers to discharge: Yes       Entered by: Megha Kemp RN 03/22/2023 10:20 PM     VSS. A&Ox2. Denies pain. External catheter draining. Bottom intact besides small reddnened area/abrasion. Repositioned q2h. Mg replacement complete, recheck in AM. Awaiting TCU.

## 2023-03-23 NOTE — PLAN OF CARE
PRIMARY DIAGNOSIS: GENERALIZED WEAKNESS     OUTPATIENT/OBSERVATION GOALS TO BE MET BEFORE DISCHARGE  1. Orthostatic performed: N/A     2. Tolerating PO medications: Yes     3. Return to near baseline physical activity: No     4. Cleared for discharge by consultants (if involved): Yes     Discharge Planner Nurse   Safe discharge environment identified: No  Barriers to discharge: Yes - placement  Please review provider order for any additional goals.   Nurse to notify provider when observation goals have been met and patient is ready for discharge.     Patient disoriented to time and situation.  VSS.  Denies nausea, SOB and pain.  External cath in place.  Turned q2.  Alarms on for safety.  Awaiting discharge destination.

## 2023-03-23 NOTE — PROGRESS NOTES
"PRIMARY DIAGNOSIS: \"GENERIC\" NURSING  OUTPATIENT/OBSERVATION GOALS TO BE MET BEFORE DISCHARGE:  1. ADLs back to baseline: No    2. Activity and level of assistance: Offloading pressure while in bed.     3. Pain status: Pain free.    4. Return to near baseline physical activity: Yes     "

## 2023-03-23 NOTE — PROGRESS NOTES
Hospitalist Progress Note    Assessment/Plan  68-year-old male with medical history significant for hyperlipidemia, hypertension, mood disorder and a documented previous CVA, brought in by the [facility] officials as a vulnerable adult in need of placement, found with     Leukocytosis, POA.    Resolved.  Hypomagnesemia  Elevated liver enzymes  Hepatitis C antibody (+)  Multiple pressure ulcers unstageable.        Plan  -Patient medically stable for transfer to next site of care.  Still awaiting safe discharge/placement.  -[facility] to [facility]  -Continue PTA medications  -Wound care consult for pressure ulcer  -I will discontinue IV fluid therapy.    WBC WNL.  Patient tolerating orally..  -Continue to replete electrolytes as needed   -PT/OT records reviewed: Please refer to notes of 3/21.  -Monitor for electrolytes.  Monitor liver enzymes.  3/23  -Stable.  Awaiting safe discharge.  -Discussed CODE STATUS with patient: Desires to be DNR/DNI.  (Amber ACEVEDO RN available at bedside as witness.)  -Continue current supportive care.       Disposition: Awaiting safe discharge plans for.  DVT Prophylaxis: SCDs      Subjective  Denies any new complaints.  No acute events overnight.  Awaiting safe discharge plans.    Objective    Vital signs in last 24 hours  Temp:  [97.2  F (36.2  C)-98.8  F (37.1  C)] 98.6  F (37  C)  Pulse:  [68-72] 72  Resp:  [18-20] 18  BP: (128-138)/(74-88) 138/77  SpO2:  [93 %-97 %] 93 % @LASTSAO2(12)@ O2 Device: None (Room air)    Weight:   Wt Readings from Last 3 Encounters:   03/23/23 68.9 kg (151 lb 14.4 oz)      Weight change:     Intake/Output last 3 shifts  I/O last 3 completed shifts:  In: 480 [P.O.:480]  Out: 300 [Urine:300]  Body mass index is 21.79 kg/m .    Physical Exam    General Appearance:    Alert, cooperative, no distress, appears stated age   Lungs:     Clear bilaterally    Cardiovascular:    Regular rate ands rhythm.  Normal S1, S2.  No murmur, rub  or gallop.  No edema   Abdomen:     Soft, non-tender, bowel sounds active all four quadrants,     no masses, no organomegaly   Neurologic:   Awake, alert, oriented x 3.  Grossly nonfocal      Pertinent Labs   Lab Results: personally reviewed.   Recent Labs   Lab 03/23/23  0635 03/22/23  0741 03/20/23  1739    140 141   CO2 23 22 23   BUN 17 16 16   ALBUMIN  --  2.7* 3.5   ALKPHOS  --  77 106   ALT  --  69* 89*   AST  --  62* 64*     Recent Labs   Lab 03/23/23  0635 03/22/23  0741 03/20/23  1739   WBC 9.6 8.9 16.3*   HGB 12.9* 12.6* 14.6   HCT 39.4* 37.0* 43.7    171 212     No results for input(s): CKTOTAL, TROPONINI in the last 168 hours.    Invalid input(s): TROPONINT, CKMBINDEX  Invalid input(s): POCGLUFGR    Medications  Current Facility-Administered Medications   Medication     aspirin EC tablet 81 mg     atorvastatin (LIPITOR) tablet 10 mg     cholecalciferol (VITAMIN D3) 10 mcg (400 units) tablet 10 mcg     docusate sodium (COLACE) capsule 100 mg     melatonin tablet 1 mg     mirtazapine (REMERON) tablet 45 mg     multivitamin w/minerals (THERA-VIT-M) tablet 1 tablet     ondansetron (ZOFRAN ODT) ODT tab 4 mg    Or     ondansetron (ZOFRAN) injection 4 mg     senna-docusate (SENOKOT-S/PERICOLACE) 8.6-50 MG per tablet 1 tablet    Or     senna-docusate (SENOKOT-S/PERICOLACE) 8.6-50 MG per tablet 2 tablet     thiamine (B-1) tablet 100 mg       Pertinent Radiology   Radiology Results: Personally reviewed   Results for orders placed or performed during the hospital encounter of 03/20/23   Head CT w/o contrast    Impression    IMPRESSION:  1.  No CT evidence for acute intracranial process.  2.  Brain atrophy and presumed chronic microvascular ischemic changes as above.       I spent a total of 39 minutes for this encounter with interval history, physical exam, in patient medication review and reconciliation.      Maddy Angeles DO  Internal Medicine Hospitalist  3/22/2023

## 2023-03-23 NOTE — PROGRESS NOTES
Care Management Follow Up    Length of Stay (days): 0    Expected Discharge Date: 03/27/2023     Concerns to be Addressed: discharge planning       Patient plan of care discussed at interdisciplinary rounds: Yes    Anticipated Discharge Disposition:  long term care TBD  Disposition Comments: long term care TBD    Anticipated Discharge Services: Transportation Services    Anticipated Discharge DME:  TBD      Education Provided on the Discharge Plan:  AVS per bedside RN    Patient/Family in Agreement with the Plan: yes    Referrals Placed by CM/SW:  CM contacted [facility] and sent other post acute care referrals        Additional Information:  Chart reviewed. CM following for discharge needs. Referrals sent for long term care. Transportation TBD. CM will follow.     Referrals pending  [facility] (SNF)  [facility](TCU)  [facility] ()    [facility]   has been notified of this hospital admission. [id number]. 10:15 AM    Zena Merlos RN

## 2023-03-24 ENCOUNTER — APPOINTMENT (OUTPATIENT)
Dept: CT IMAGING | Facility: CLINIC | Age: 68
DRG: 178 | End: 2023-03-24
Attending: INTERNAL MEDICINE
Payer: MEDICARE

## 2023-03-24 LAB
HOLD SPECIMEN: NORMAL
MAGNESIUM SERPL-MCNC: 1.6 MG/DL (ref 1.8–2.6)
PROCALCITONIN SERPL-MCNC: 0.09 NG/ML (ref 0–0.49)

## 2023-03-24 PROCEDURE — 250N000011 HC RX IP 250 OP 636: Performed by: INTERNAL MEDICINE

## 2023-03-24 PROCEDURE — 96366 THER/PROPH/DIAG IV INF ADDON: CPT

## 2023-03-24 PROCEDURE — 83735 ASSAY OF MAGNESIUM: CPT | Performed by: INTERNAL MEDICINE

## 2023-03-24 PROCEDURE — 99232 SBSQ HOSP IP/OBS MODERATE 35: CPT | Performed by: INTERNAL MEDICINE

## 2023-03-24 PROCEDURE — G0378 HOSPITAL OBSERVATION PER HR: HCPCS

## 2023-03-24 PROCEDURE — 36415 COLL VENOUS BLD VENIPUNCTURE: CPT | Performed by: INTERNAL MEDICINE

## 2023-03-24 PROCEDURE — G1010 CDSM STANSON: HCPCS

## 2023-03-24 PROCEDURE — 84145 PROCALCITONIN (PCT): CPT | Performed by: INTERNAL MEDICINE

## 2023-03-24 PROCEDURE — 250N000013 HC RX MED GY IP 250 OP 250 PS 637: Performed by: INTERNAL MEDICINE

## 2023-03-24 PROCEDURE — 258N000003 HC RX IP 258 OP 636: Performed by: INTERNAL MEDICINE

## 2023-03-24 RX ORDER — IOPAMIDOL 755 MG/ML
100 INJECTION, SOLUTION INTRAVASCULAR ONCE
Status: COMPLETED | OUTPATIENT
Start: 2023-03-24 | End: 2023-03-24

## 2023-03-24 RX ADMIN — IOPAMIDOL 100 ML: 755 INJECTION, SOLUTION INTRAVENOUS at 16:20

## 2023-03-24 RX ADMIN — ATORVASTATIN CALCIUM 10 MG: 10 TABLET, FILM COATED ORAL at 20:48

## 2023-03-24 RX ADMIN — DOCUSATE SODIUM 100 MG: 100 CAPSULE, LIQUID FILLED ORAL at 08:08

## 2023-03-24 RX ADMIN — CHOLECALCIFEROL TAB 10 MCG (400 UNIT) 10 MCG: 10 TAB at 08:09

## 2023-03-24 RX ADMIN — MULTIPLE VITAMINS W/ MINERALS TAB 1 TABLET: TAB at 08:08

## 2023-03-24 RX ADMIN — DOCUSATE SODIUM 100 MG: 100 CAPSULE, LIQUID FILLED ORAL at 20:48

## 2023-03-24 RX ADMIN — Medication 100 MG: at 08:09

## 2023-03-24 RX ADMIN — MIRTAZAPINE 45 MG: 30 TABLET, FILM COATED ORAL at 20:47

## 2023-03-24 RX ADMIN — ASPIRIN 81 MG: 81 TABLET, COATED ORAL at 08:08

## 2023-03-24 RX ADMIN — SENNOSIDES AND DOCUSATE SODIUM 2 TABLET: 50; 8.6 TABLET ORAL at 20:49

## 2023-03-24 RX ADMIN — MAGNESIUM SULFATE HEPTAHYDRATE 3 G: 500 INJECTION, SOLUTION INTRAMUSCULAR; INTRAVENOUS at 11:04

## 2023-03-24 ASSESSMENT — ACTIVITIES OF DAILY LIVING (ADL)
ADLS_ACUITY_SCORE: 54
ADLS_ACUITY_SCORE: 51
ADLS_ACUITY_SCORE: 56
ADLS_ACUITY_SCORE: 51
ADLS_ACUITY_SCORE: 56
ADLS_ACUITY_SCORE: 49
ADLS_ACUITY_SCORE: 56
ADLS_ACUITY_SCORE: 54
ADLS_ACUITY_SCORE: 51
ADLS_ACUITY_SCORE: 54

## 2023-03-24 NOTE — PROGRESS NOTES
PRIMARY DIAGNOSIS: GENERALIZED WEAKNESS    OUTPATIENT/OBSERVATION GOALS TO BE MET BEFORE DISCHARGE  1. Orthostatic performed: N/A    2. Tolerating PO medications: Yes    3. Return to near baseline physical activity: No    4. Cleared for discharge by consultants (if involved): Yes    Discharge Planner Nurse   Safe discharge environment identified: No  Barriers to discharge: Yes       Entered by: Caren Hand RN 03/24/2023 4:32 AM     Please review provider order for any additional goals.   Nurse to notify provider when observation goals have been met and patient is ready for discharge.    Patient disoriented to time and situation. External cath in place. Q2 hour turns, although patient did occassionally turn self. Alarms on for safety. Dried scab on top of scalp, open to air.

## 2023-03-24 NOTE — PROGRESS NOTES
Hospitalist Progress Note    Assessment/Plan  68-year-old male with medical history significant for hyperlipidemia, hypertension, mood disorder and a documented previous CVA, brought in by the [facility] officials as a vulnerable adult in need of placement, found with     Leukocytosis, POA.    Resolved.  Hypomagnesemia  Elevated liver enzymes  Hepatitis C antibody (+)  Multiple pressure ulcers unstageable.        Plan  -Patient medically stable for transfer to next site of care.  Still awaiting safe discharge/placement.  -[facility], to [facility]  -Continue PTA medications  -Wound care consult for pressure ulcer  -I will discontinue IV fluid therapy.    WBC WNL.  Patient tolerating orally..  -Continue to replete electrolytes as needed   -PT/OT records reviewed: Please refer to notes of 3/21.  -Monitor for electrolytes.  Monitor liver enzymes.  3/23  -Stable.  Awaiting safe discharge.  -Discussed CODE STATUS with patient: Desires to be DNR/DNI.  (Amber ACEVEDO RN available at bedside as witness.)  -Continue current supportive care.  3/24  -New CXR finding of 3.1 cm right lower lobe rounded mass.  Will do CT chest.  I will consult urology as indicated, pending definitive imaging.  -Continues to await safe discharge plans.  -Continue to monitor and replete magnesium as needed    3: 21 pm  -CT chest with 3.3 x 2.9 cm mass involving the right middle lobe. Bibasilar bronchial wall thickening and centrilobular infiltrate, compatible with an infectious process.  - ?bronch vs IR biopsy of mass; consults extended to pulmonology/oncology.  -check procalcitionin. Check CT abd/pelvis wo.  -other management as stated above.        Disposition: Awaiting safe discharge plans.  Follow CT chest.  DVT Prophylaxis: SCDs      Subjective  Denies any new complaints.  No acute events overnight.  Awaiting safe discharge plans.    Objective    Vital signs in last 24 hours  Temp:  [97.5  F (36.4  C)-101.8  F  (38.8  C)] 97.5  F (36.4  C)  Pulse:  [72-81] 77  Resp:  [16-22] 16  BP: (118-139)/(62-92) 137/92  SpO2:  [93 %-97 %] 95 % @LASTSAO2(12)@ O2 Device: None (Room air)    Weight:   Wt Readings from Last 3 Encounters:   03/23/23 68.9 kg (151 lb 14.4 oz)      Weight change:     Intake/Output last 3 shifts  I/O last 3 completed shifts:  In: 180 [P.O.:180]  Out: -   Body mass index is 21.79 kg/m .    Physical Exam    General Appearance:    Alert, cooperative, no distress, appears stated age   Lungs:     Clear bilaterally    Cardiovascular:    Regular rate ands rhythm.  Normal S1, S2.  No murmur, rub or gallop.  No edema   Abdomen:     Soft, non-tender, bowel sounds active all four quadrants,     no masses, no organomegaly   Neurologic:   Awake, alert, oriented x 3.  Grossly nonfocal      Pertinent Labs   Lab Results: personally reviewed.   Recent Labs   Lab 03/23/23  0635 03/22/23  0741 03/20/23  1739    140 141   CO2 23 22 23   BUN 17 16 16   ALBUMIN  --  2.7* 3.5   ALKPHOS  --  77 106   ALT  --  69* 89*   AST  --  62* 64*     Recent Labs   Lab 03/23/23  0635 03/22/23  0741 03/20/23  1739   WBC 9.6 8.9 16.3*   HGB 12.9* 12.6* 14.6   HCT 39.4* 37.0* 43.7    171 212     No results for input(s): CKTOTAL, TROPONINI in the last 168 hours.    Invalid input(s): TROPONINT, CKMBINDEX  Invalid input(s): POCGLUFGR    Medications  Current Facility-Administered Medications   Medication     acetaminophen (TYLENOL) tablet 650 mg     aspirin EC tablet 81 mg     atorvastatin (LIPITOR) tablet 10 mg     cholecalciferol (VITAMIN D3) 10 mcg (400 units) tablet 10 mcg     docusate sodium (COLACE) capsule 100 mg     magnesium sulfate 3 g in 100 mL NS intermittent infusion     melatonin tablet 1 mg     mirtazapine (REMERON) tablet 45 mg     multivitamin w/minerals (THERA-VIT-M) tablet 1 tablet     ondansetron (ZOFRAN ODT) ODT tab 4 mg    Or     ondansetron (ZOFRAN) injection 4 mg     senna-docusate (SENOKOT-S/PERICOLACE) 8.6-50 MG  per tablet 1 tablet    Or     senna-docusate (SENOKOT-S/PERICOLACE) 8.6-50 MG per tablet 2 tablet     thiamine (B-1) tablet 100 mg       Pertinent Radiology   Radiology Results: Personally reviewed   Results for orders placed or performed during the hospital encounter of 03/20/23   Head CT w/o contrast    Impression    IMPRESSION:  1.  No CT evidence for acute intracranial process.  2.  Brain atrophy and presumed chronic microvascular ischemic changes as above.       I spent a total of 39 minutes for this encounter with interval history, physical exam, in patient medication review and reconciliation.      Maddy Angeles DO  Internal Medicine Hospitalist  3/22/2023

## 2023-03-24 NOTE — PLAN OF CARE
"PRIMARY DIAGNOSIS: \"GENERIC\" NURSING  OUTPATIENT/OBSERVATION GOALS TO BE MET BEFORE DISCHARGE:  1. ADLs back to baseline: Yes    2. Activity and level of assistance: turn and reposition every 2 hours and as needed. Air mattress added to bed.    3. Pain status: Pain free.    4. Return to near baseline physical activity: Yes     Discharge Planner Nurse   Safe discharge environment identified: No  Barriers to discharge: Yes, Hematology/Oncology & Pulmonology Consults, Placement.       Entered by: Sharonda Clinton RN 03/24/2023   "

## 2023-03-24 NOTE — PROGRESS NOTES
PRIMARY DIAGNOSIS: GENERALIZED WEAKNESS    OUTPATIENT/OBSERVATION GOALS TO BE MET BEFORE DISCHARGE  1. Orthostatic performed: N/A    2. Tolerating PO medications: Yes    3. Return to near baseline physical activity: No    4. Cleared for discharge by consultants (if involved): Yes    Discharge Planner Nurse   Safe discharge environment identified: No  Barriers to discharge: Yes       Entered by: Caren Hand RN 03/24/2023 6:46 AM     Please review provider order for any additional goals.   Nurse to notify provider when observation goals have been met and patient is ready for discharge.    Patient disoriented to time and situation. External cath in place. Q2 hour turns, although patient did occassionally turn self. Alarms on for safety. Dried scab on top of scalp, open to air.

## 2023-03-24 NOTE — PROGRESS NOTES
Informed of chest XR results    New 3.1 cm rounded mass in the right lower lobe.  No CHF, lobar consolidation, or large effusions. No acute bony abnormalities    Plans -- Will inform AM doc, may need further evaluation. But not urgent at this point -- of note[facility] --- may need records to obtain specially chest imaging -- will inform RN to have them fax for comparison

## 2023-03-24 NOTE — PLAN OF CARE
PRIMARY DIAGNOSIS: GENERALIZED WEAKNESS    OUTPATIENT/OBSERVATION GOALS TO BE MET BEFORE DISCHARGE  1. Orthostatic performed: N/A    2. Tolerating PO medications: Yes    3. Return to near baseline physical activity: Yes    4. Cleared for discharge by consultants (if involved): N/A     03/23/2023 11:31 PM     Please review provider order for any additional goals.   Nurse to notify provider when observation goals have been met and patient is ready for discharge.Goal Outcome Evaluation:       Alert, disoriented to place and time. Incontinent of urine, using male external urinary catheter. Repositioning q 2 hours. Had a T max of 101.8, Dr Lawson was notified, blood cultures and chest x ray were ordered. Received tylenol and ice packs.

## 2023-03-24 NOTE — PROGRESS NOTES
Referrals pending  [facility] [place] [facility] [place] [facility]  [facility]  [facility] ()  [facility] ()  [facility] ()  [facility] [place] [facility] [place] [facility]  [facility] ([facility])

## 2023-03-24 NOTE — CONSULTS
ASSESSMENT:  1. Acute generalized abdominal pain    2. Need for vaccination      PLAN:  Take omeprazole regularly   Take ibuprofen when bad cramping   Monitor menses   Consider gi referral     After the appt mom spoke with me re: her concerns of Kindra's mood ?anxiety/depression  She is dressing goth   She is withdrawing   She is defensive and unwilling to participate in simple chores  *her mood is flat and blah today   She does see counseling - [facility]   At last visit this was reviewed - note reviewed after appt   Mom ok with me reaching out to Cass with update   ---------------------------------------------------------------------------------------------------------------  Chief Complaint   Patient presents with   • Abdominal Pain     certain spots with pain   • Nausea     feels like throwing up       HISTORY OF PRESENT ILLNESS:    Kindra Mark is a 13 year old.female who presents with mom   Stomach cramps at times and nauseaous  Comes and goes   Missed school wed and Thursday   throughtout the day the pain can last or can be a brief spel  Napped a lot during htose days and all weekend    No high fevers   Friday fine and ok at school     No cough   No dysuria   No bm prob   No coughing   Mom sick - more with upper and lower respiratory and then bad side pains  2 siblings starting with cough   One sib with intermittent Stomach cramps    Not with menses   LMP beginning of October     Tylenol maybe jose a help   Omeprazole - took today - help some      Allergies, medications and problem list reviewed.   Rest of 11 point review of systems was negative.    PHYSICAL EXAMINATION:  Visit Vitals  /62 (BP Location: LUE - Left upper extremity, Patient Position: Sitting, Cuff Size: Regular)   Temp 99.1 °F (37.3 °C) (Temporal)   Wt 55.2 kg   LMP 10/14/2019 (Approximate)   Breastfeeding No     GENERAL:   Kindra Mark is alert, well-nourished appearing and in no distress.  Non-toxic  Care Management Follow Up    Length of Stay (days): 0    Expected Discharge Date: 03/27/2023     Concerns to be Addressed: discharge planning       Patient plan of care discussed at interdisciplinary rounds: Yes    Anticipated Discharge Disposition:  TBD  Disposition Comments: TBD    Anticipated Discharge Services: Transportation Services    Anticipated Discharge DME:  TBD     Education Provided on the Discharge Plan:  AVS per bedside RN    Patient/Family in Agreement with the Plan: yes    Referrals Placed by CM/SW:  Referrals pending        Additional Information:  Chart reviewed. CM following for discharge needs. CM attempted to reach Gerry (adult protection worker) but was unsuccessful. CM left  for Gerry (adult protection worker) to return call to CM. [facility] for [facility]). It is unclear who the decision maker for this patient is. Long term care referrals pending. CM will follow.       CM consults for-  Decision Maker and Health Care Directive. It appears patient has a healthcare [facility] with [facility].  Patient currently has no CODE STATUS on file.  Patient is a poor historian.  Not quite clear about decision maker.  Discharge Planning/Disposition    Zena Merlos RN             appearing.  SKIN:   Warm and well perfused.  HEAD:  Atraumatic and normocephalic  EYES:  Normal lids, sclerae, conjunctivae bilaterally. Gaze is conjugate.  Pupils equal, round, reactive to light and accommodation, extraocular movements intact.  EARS:  Normal pinnae and canals bilaterally.  Ears are normal set.  left tympanic membrane:  normal  right  tympanic membrane: normal  NOSE:  Septum midline and turbinates normal.   THROAT:  Mouth, tongue and lips are normal.    Palate:  Normal     Posterior Pharynx: normal    Tonsils normal  NECK: Supple, without cervical or supraclavicular lymphadenopathy.  HEART:  Regular rate and rhythm, no murmur.  LUNGS:  Clear to auscultation.  No rales/rhonchi/wheezes. No accessory muscle usage.   ABDOMEN:  Soft, mild tender diffusely,not distended, no HSM  NEUROLOGICAL:  Speech age appropriate, gait normal.   __________________________________________________________________

## 2023-03-24 NOTE — PROGRESS NOTES
"PRIMARY DIAGNOSIS: \"GENERIC\" NURSING  OUTPATIENT/OBSERVATION GOALS TO BE MET BEFORE DISCHARGE:  1. ADLs back to baseline: Yes    2. Activity and level of assistance: off load pressure turns while in bed A2     3. Pain status: Pain free.    4. Return to near baseline physical activity: Yes     Updated provider about CT findings      "

## 2023-03-24 NOTE — PROGRESS NOTES
"PRIMARY DIAGNOSIS: \"GENERIC\" NURSING  OUTPATIENT/OBSERVATION GOALS TO BE MET BEFORE DISCHARGE:  1. ADLs back to baseline: Yes    2. Activity and level of assistance: off load pressure turns while in bed A2     3. Pain status: Pain free.    4. Return to near baseline physical activity: Yes       "

## 2023-03-25 LAB
ENTEROCOCCUS FAECALIS: NOT DETECTED
ENTEROCOCCUS FAECIUM: NOT DETECTED
LISTERIA SPECIES (DETECTED/NOT DETECTED): NOT DETECTED
MAGNESIUM SERPL-MCNC: 1.9 MG/DL (ref 1.8–2.6)
STAPHYLOCOCCUS AUREUS: NOT DETECTED
STAPHYLOCOCCUS EPIDERMIDIS: NOT DETECTED
STAPHYLOCOCCUS LUGDUNENSIS: NOT DETECTED
STAPHYLOCOCCUS SPECIES: DETECTED
STREPTOCOCCUS AGALACTIAE: NOT DETECTED
STREPTOCOCCUS ANGINOSUS GROUP: NOT DETECTED
STREPTOCOCCUS PNEUMONIAE: NOT DETECTED
STREPTOCOCCUS PYOGENES: NOT DETECTED
STREPTOCOCCUS SPECIES: NOT DETECTED

## 2023-03-25 PROCEDURE — 96376 TX/PRO/DX INJ SAME DRUG ADON: CPT

## 2023-03-25 PROCEDURE — 250N000011 HC RX IP 250 OP 636: Performed by: INTERNAL MEDICINE

## 2023-03-25 PROCEDURE — 250N000013 HC RX MED GY IP 250 OP 250 PS 637: Performed by: HOSPITALIST

## 2023-03-25 PROCEDURE — 258N000003 HC RX IP 258 OP 636: Performed by: INTERNAL MEDICINE

## 2023-03-25 PROCEDURE — 96366 THER/PROPH/DIAG IV INF ADDON: CPT

## 2023-03-25 PROCEDURE — G0378 HOSPITAL OBSERVATION PER HR: HCPCS

## 2023-03-25 PROCEDURE — 99222 1ST HOSP IP/OBS MODERATE 55: CPT | Performed by: INTERNAL MEDICINE

## 2023-03-25 PROCEDURE — 36415 COLL VENOUS BLD VENIPUNCTURE: CPT | Performed by: INTERNAL MEDICINE

## 2023-03-25 PROCEDURE — 96375 TX/PRO/DX INJ NEW DRUG ADDON: CPT

## 2023-03-25 PROCEDURE — 83735 ASSAY OF MAGNESIUM: CPT | Performed by: INTERNAL MEDICINE

## 2023-03-25 PROCEDURE — 99231 SBSQ HOSP IP/OBS SF/LOW 25: CPT | Performed by: INTERNAL MEDICINE

## 2023-03-25 PROCEDURE — 250N000013 HC RX MED GY IP 250 OP 250 PS 637: Performed by: INTERNAL MEDICINE

## 2023-03-25 RX ORDER — PIPERACILLIN SODIUM, TAZOBACTAM SODIUM 3; .375 G/15ML; G/15ML
3.38 INJECTION, POWDER, LYOPHILIZED, FOR SOLUTION INTRAVENOUS EVERY 8 HOURS
Status: DISCONTINUED | OUTPATIENT
Start: 2023-03-25 | End: 2023-03-28

## 2023-03-25 RX ORDER — PIPERACILLIN SODIUM, TAZOBACTAM SODIUM 3; .375 G/15ML; G/15ML
3.38 INJECTION, POWDER, LYOPHILIZED, FOR SOLUTION INTRAVENOUS ONCE
Status: COMPLETED | OUTPATIENT
Start: 2023-03-25 | End: 2023-03-25

## 2023-03-25 RX ADMIN — DOCUSATE SODIUM 100 MG: 100 CAPSULE, LIQUID FILLED ORAL at 08:50

## 2023-03-25 RX ADMIN — Medication 100 MG: at 08:50

## 2023-03-25 RX ADMIN — PIPERACILLIN AND TAZOBACTAM 3.38 G: 3; .375 INJECTION, POWDER, LYOPHILIZED, FOR SOLUTION INTRAVENOUS at 17:03

## 2023-03-25 RX ADMIN — ACETAMINOPHEN 650 MG: 325 TABLET ORAL at 21:00

## 2023-03-25 RX ADMIN — ATORVASTATIN CALCIUM 10 MG: 10 TABLET, FILM COATED ORAL at 20:59

## 2023-03-25 RX ADMIN — DOCUSATE SODIUM 100 MG: 100 CAPSULE, LIQUID FILLED ORAL at 21:00

## 2023-03-25 RX ADMIN — CHOLECALCIFEROL TAB 10 MCG (400 UNIT) 10 MCG: 10 TAB at 08:50

## 2023-03-25 RX ADMIN — MULTIPLE VITAMINS W/ MINERALS TAB 1 TABLET: TAB at 08:50

## 2023-03-25 RX ADMIN — MIRTAZAPINE 45 MG: 30 TABLET, FILM COATED ORAL at 21:00

## 2023-03-25 RX ADMIN — ASPIRIN 81 MG: 81 TABLET, COATED ORAL at 08:50

## 2023-03-25 RX ADMIN — PIPERACILLIN AND TAZOBACTAM 3.38 G: 3; .375 INJECTION, POWDER, LYOPHILIZED, FOR SOLUTION INTRAVENOUS at 11:39

## 2023-03-25 RX ADMIN — VANCOMYCIN HYDROCHLORIDE 1000 MG: 5 INJECTION, POWDER, LYOPHILIZED, FOR SOLUTION INTRAVENOUS at 11:40

## 2023-03-25 ASSESSMENT — ACTIVITIES OF DAILY LIVING (ADL)
ADLS_ACUITY_SCORE: 56

## 2023-03-25 NOTE — PLAN OF CARE
"PRIMARY DIAGNOSIS: \"GENERIC\" NURSING  OUTPATIENT/OBSERVATION GOALS TO BE MET BEFORE DISCHARGE:  ADLs back to baseline: Yes    Activity and level of assistance: Patient is assist of 2 with pivot transfers and gait belt.     Pain status: Pain free.    Return to near baseline physical activity: Yes     Discharge Planner Nurse   Safe discharge environment identified: No, needs LTC.  Barriers to discharge: Yes, placement. IV Abx.       Entered by: Sharonda Clinton RN 03/25/2023     Patient alert and oriented times four. Hard of hearing. Patient speech is soft, slow, and at times garbled. Allowing time and simple questions is helpful for patient. Assist of two with gait belt and pivot transfers. Patient had a shower today and a large bowel movement. IV antibiotics initiated per [facility] and Pulmonology. Afebrile this shift. Patient tearful while sitting up in the chair looking at pictures of his family, writer sat with patient while listening to him reminisce.  "

## 2023-03-25 NOTE — PHARMACY-VANCOMYCIN DOSING SERVICE
Pharmacy Vancomycin Initial Note  Date of Service 2023  Patient's  1955  68 year old, male    Indication: Bacteremia    Current estimated CrCl = Estimated Creatinine Clearance: 93.1 mL/min (based on SCr of 0.75 mg/dL).    Creatinine for last 3 days  3/23/2023:  6:35 AM Creatinine 0.75 mg/dL    Recent Vancomycin Level(s) for last 3 days  No results found for requested labs within last 72 hours.      Vancomycin IV Administrations (past 72 hours)      No vancomycin orders with administrations in past 72 hours.                Nephrotoxins and other renal medications (From now, onward)    Start     Dose/Rate Route Frequency Ordered Stop    23 1100  vancomycin (VANCOCIN) 1,000 mg in 0.9% NaCl 250 mL intermittent infusion         1,000 mg  over 60 Minutes Intravenous EVERY 12 HOURS 23 1022            Contrast Orders - past 72 hours (72h ago, onward)    Start     Dose/Rate Route Frequency Stop    23 1630  iopamidol (ISOVUE-370) solution 100 mL         100 mL Intravenous ONCE 23 1620          [employer] Prediction of Planned Initial Vancomycin Regimen  Loading dose: N/A  Regimen: 1000 mg IV every 12 hours.  Start time: 10:23 on 2023  Exposure target: AUC24 (range)400-600 mg/L.hr   AUC24,ss: 528 mg/L.hr  Probability of AUC24 > 400: 79 %  Ctrough,ss: 16.3 mg/L  Probability of Ctrough,ss > 20: 33 %  Probability of nephrotoxicity (Lodise MONSERRAT ): 12 %          Plan:  1. Start vancomycin  1000 mg IV q12h.   2. Vancomycin monitoring method: AUC  3. Vancomycin therapeutic monitoring goal: 400-600 mg*h/L  4. Pharmacy will check vancomycin levels as appropriate in 1-3 Days.    5. Serum creatinine levels will be ordered daily for the first week of therapy and at least twice weekly for subsequent weeks.      Laura Arthur, PharmD

## 2023-03-25 NOTE — PROGRESS NOTES
"Hospitalist Progress Note    Assessment/Plan  68-year-old male with medical history significant for hyperlipidemia, hypertension, mood disorder and a documented previous CVA, brought in by the [facility] officials as a vulnerable adult in need of placement, found with     Leukocytosis, POA.    Resolved.  Hypomagnesemia  Elevated liver enzymes  Hepatitis C antibody (+)  Multiple pressure ulcers unstageable.  Right lower lobe rounded mass (3.1 cm)        Plan  -Patient medically stable for transfer to next site of care.  Still awaiting safe discharge/placement.  -[facility].  -Continue PTA medications  -PT/OT records reviewed: Please refer to notes of 3/21.  -Discussed CODE STATUS with patient: Desires to be DNR/DNI.  (Amber ACEVEDO RN available at bedside as witness.)  -CT chest with 3.3 x 2.9 cm mass involving the right middle lobe. Bibasilar bronchial wall thickening and centrilobular infiltrate, compatible with an infectious process.  -Pulmonology consult note reviewed, appreciate input: Per notes \"It would be reasonable to monitor this given potential that it is a small abscess with repeat CT scan in 6 weeks. If still there could pursue outpatient PET scan and plan for biopsy to complete staging and diagnosis.\"  -Pulmonologist, MEME Galloway added \"in case this is an abscess.\"  Recommends to complete 14 days antibiotics course.  To switch to Augmentin when ready for discharge.  (Please refer to intensivist notes of 3/25)  -Based on pulmonology recommendations, will discontinue oncology consult.  -Still awaiting safe discharge plans.       Disposition: Pending progress.  DVT Prophylaxis: SCDs      Subjective  Denies any new complaints.  No acute events overnight.  Awaiting safe discharge plans.    Objective    Vital signs in last 24 hours  Temp:  [97.8  F (36.6  C)-99.8  F (37.7  C)] 98.2  F (36.8  C)  Pulse:  [69-87] 87  Resp:  [16-20] 18  BP: (120-141)/(77-87) 130/87  SpO2:  [94 %-95 %] 94 " % @LASTSAO2(12)@ O2 Device: None (Room air)    Weight:   Wt Readings from Last 3 Encounters:   03/25/23 68.9 kg (152 lb)      Weight change:     Intake/Output last 3 shifts  I/O last 3 completed shifts:  In: 1000 [P.O.:1000]  Out: 1150 [Urine:1150]  Body mass index is 21.81 kg/m .    Physical Exam    General Appearance:    Alert, cooperative, no distress, appears stated age   Lungs:     Clear bilaterally    Cardiovascular:    Regular rate ands rhythm.  Normal S1, S2.  No murmur, rub or gallop.  No edema   Abdomen:     Soft, non-tender, bowel sounds active all four quadrants,     no masses, no organomegaly   Neurologic:   Awake, alert, oriented x 3.  Grossly nonfocal      Pertinent Labs   Lab Results: personally reviewed.   Recent Labs   Lab 03/23/23  0635 03/22/23  0741 03/20/23  1739    140 141   CO2 23 22 23   BUN 17 16 16   ALBUMIN  --  2.7* 3.5   ALKPHOS  --  77 106   ALT  --  69* 89*   AST  --  62* 64*     Recent Labs   Lab 03/23/23  0635 03/22/23  0741 03/20/23  1739   WBC 9.6 8.9 16.3*   HGB 12.9* 12.6* 14.6   HCT 39.4* 37.0* 43.7    171 212     No results for input(s): CKTOTAL, TROPONINI in the last 168 hours.    Invalid input(s): TROPONINT, CKMBINDEX  Invalid input(s): POCGLUFGR    Medications  Current Facility-Administered Medications   Medication     acetaminophen (TYLENOL) tablet 650 mg     aspirin EC tablet 81 mg     atorvastatin (LIPITOR) tablet 10 mg     cholecalciferol (VITAMIN D3) 10 mcg (400 units) tablet 10 mcg     docusate sodium (COLACE) capsule 100 mg     melatonin tablet 1 mg     mirtazapine (REMERON) tablet 45 mg     multivitamin w/minerals (THERA-VIT-M) tablet 1 tablet     ondansetron (ZOFRAN ODT) ODT tab 4 mg    Or     ondansetron (ZOFRAN) injection 4 mg     senna-docusate (SENOKOT-S/PERICOLACE) 8.6-50 MG per tablet 1 tablet    Or     senna-docusate (SENOKOT-S/PERICOLACE) 8.6-50 MG per tablet 2 tablet     thiamine (B-1) tablet 100 mg       Pertinent Radiology   Radiology  Results: Personally reviewed   Results for orders placed or performed during the hospital encounter of 03/20/23   Head CT w/o contrast    Impression    IMPRESSION:  1.  No CT evidence for acute intracranial process.  2.  Brain atrophy and presumed chronic microvascular ischemic changes as above.       I spent a total of 33 minutes for this encounter with interval history, physical exam, in patient medication review and reconciliation.      Maddy Angeles DO  Internal Medicine Hospitalist  3/22/2023

## 2023-03-25 NOTE — PLAN OF CARE
"PRIMARY DIAGNOSIS: \"GENERIC\" NURSING  OUTPATIENT/OBSERVATION GOALS TO BE MET BEFORE DISCHARGE:  ADLs back to baseline: Yes    Activity and level of assistance: Assist of 2 with pivot transfer and gait belt.    Pain status: Pain free.    Return to near baseline physical activity: Yes     Discharge Planner Nurse   Safe discharge environment identified: No, needs long term care.  Barriers to discharge: Yes, placement. IV antibiotics.       Entered by: Sharonda Clinton RN 03/25/2023   "

## 2023-03-25 NOTE — PROGRESS NOTES
PRIMARY DIAGNOSIS: GENERALIZED WEAKNESS    OUTPATIENT/OBSERVATION GOALS TO BE MET BEFORE DISCHARGE  1. Orthostatic performed: N/A    2. Tolerating PO medications: Yes    3. Return to near baseline physical activity: Yes    4. Cleared for discharge by consultants (if involved): No (heme/onc and pulmonology)    Discharge Planner Nurse   Safe discharge environment identified: No  Barriers to discharge: Yes, heme/onc, pulmonology consults and placement       Entered by: Erinn Dotson RN 03/25/2023 3:35 AM     Please review provider order for any additional goals.   Nurse to notify provider when observation goals have been met and patient is ready for discharge.

## 2023-03-25 NOTE — PROGRESS NOTES
PRIMARY DIAGNOSIS: GENERALIZED WEAKNESS    OUTPATIENT/OBSERVATION GOALS TO BE MET BEFORE DISCHARGE  1. Orthostatic performed: N/A    2. Tolerating PO medications: Yes    3. Return to near baseline physical activity: Yes    4. Cleared for discharge by consultants (if involved): No (heme/onc and pulmonology)    Discharge Planner Nurse   Safe discharge environment identified: No  Barriers to discharge: Yes, heme/onc, pulmonology consults and placement       Entered by: Erinn Dotson RN 03/25/2023 3:36 AM     Please review provider order for any additional goals.   Nurse to notify provider when observation goals have been met and patient is ready for discharge.

## 2023-03-25 NOTE — PLAN OF CARE
"PRIMARY DIAGNOSIS: \"GENERIC\" NURSING  OUTPATIENT/OBSERVATION GOALS TO BE MET BEFORE DISCHARGE:  ADLs back to baseline: Yes    Activity and level of assistance: turn and reposition every two hours and as needed.    Pain status: Pain free.    Return to near baseline physical activity: Yes     Discharge Planner Nurse   Safe discharge environment identified: No  Barriers to discharge: Yes       Entered by: Sharonda Clinton RN 03/25/2023            "

## 2023-03-25 NOTE — PROVIDER NOTIFICATION
MD notified of positive blood cultures, advised to notify day team. Sticky note to MD, will endorse to oncoming RN.

## 2023-03-25 NOTE — PROGRESS NOTES
PRIMARY DIAGNOSIS: GENERALIZED WEAKNESS    OUTPATIENT/OBSERVATION GOALS TO BE MET BEFORE DISCHARGE  1. Orthostatic performed: N/A    2. Tolerating PO medications: Yes    3. Return to near baseline physical activity: Yes    4. Cleared for discharge by consultants (if involved): No (heme/onc and pulmonology)    Discharge Planner Nurse   Safe discharge environment identified: No  Barriers to discharge: Yes, consults and placement       Entered by: Erinn Dotson RN 03/25/2023 4:55 AM     Please review provider order for any additional goals.   Nurse to notify provider when observation goals have been met and patient is ready for discharge.        Pt A/O x 3. Mepilex in place to sacrum for blanchable redness. [id number] and has an air mattress on bed. Utilizing external catheter, good output. IV saline locked. Mg protocol recheck in AM. Discharge pending placement.
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
## 2023-03-26 ENCOUNTER — APPOINTMENT (OUTPATIENT)
Dept: CARDIOLOGY | Facility: CLINIC | Age: 68
DRG: 178 | End: 2023-03-26
Attending: INTERNAL MEDICINE
Payer: MEDICARE

## 2023-03-26 DIAGNOSIS — R91.8 PULMONARY NODULES: Primary | ICD-10-CM

## 2023-03-26 LAB
ANION GAP SERPL CALCULATED.3IONS-SCNC: 7 MMOL/L (ref 5–18)
BUN SERPL-MCNC: 16 MG/DL (ref 8–22)
CALCIUM SERPL-MCNC: 8.7 MG/DL (ref 8.5–10.5)
CHLORIDE BLD-SCNC: 108 MMOL/L (ref 98–107)
CO2 SERPL-SCNC: 26 MMOL/L (ref 22–31)
CREAT SERPL-MCNC: 0.84 MG/DL (ref 0.7–1.3)
ERYTHROCYTE [DISTWIDTH] IN BLOOD BY AUTOMATED COUNT: 13.6 % (ref 10–15)
GFR SERPL CREATININE-BSD FRML MDRD: >90 ML/MIN/1.73M2
GLUCOSE BLD-MCNC: 71 MG/DL (ref 70–125)
HCT VFR BLD AUTO: 40 % (ref 40–53)
HGB BLD-MCNC: 12.9 G/DL (ref 13.3–17.7)
LVEF ECHO: NORMAL
MAGNESIUM SERPL-MCNC: 1.9 MG/DL (ref 1.8–2.6)
MCH RBC QN AUTO: 30.1 PG (ref 26.5–33)
MCHC RBC AUTO-ENTMCNC: 32.3 G/DL (ref 31.5–36.5)
MCV RBC AUTO: 94 FL (ref 78–100)
PLATELET # BLD AUTO: 221 10E3/UL (ref 150–450)
POTASSIUM BLD-SCNC: 4.4 MMOL/L (ref 3.5–5)
RBC # BLD AUTO: 4.28 10E6/UL (ref 4.4–5.9)
SODIUM SERPL-SCNC: 141 MMOL/L (ref 136–145)
WBC # BLD AUTO: 9 10E3/UL (ref 4–11)

## 2023-03-26 PROCEDURE — 93306 TTE W/DOPPLER COMPLETE: CPT

## 2023-03-26 PROCEDURE — 36415 COLL VENOUS BLD VENIPUNCTURE: CPT | Performed by: INTERNAL MEDICINE

## 2023-03-26 PROCEDURE — G0378 HOSPITAL OBSERVATION PER HR: HCPCS

## 2023-03-26 PROCEDURE — 258N000003 HC RX IP 258 OP 636: Performed by: INTERNAL MEDICINE

## 2023-03-26 PROCEDURE — 250N000011 HC RX IP 250 OP 636: Performed by: INTERNAL MEDICINE

## 2023-03-26 PROCEDURE — 250N000013 HC RX MED GY IP 250 OP 250 PS 637: Performed by: INTERNAL MEDICINE

## 2023-03-26 PROCEDURE — 99232 SBSQ HOSP IP/OBS MODERATE 35: CPT | Performed by: INTERNAL MEDICINE

## 2023-03-26 PROCEDURE — 87040 BLOOD CULTURE FOR BACTERIA: CPT | Performed by: INTERNAL MEDICINE

## 2023-03-26 PROCEDURE — 93306 TTE W/DOPPLER COMPLETE: CPT | Mod: 26 | Performed by: INTERNAL MEDICINE

## 2023-03-26 PROCEDURE — 99207 PR NO CHARGE LOS: CPT | Performed by: INTERNAL MEDICINE

## 2023-03-26 PROCEDURE — 85027 COMPLETE CBC AUTOMATED: CPT | Performed by: INTERNAL MEDICINE

## 2023-03-26 PROCEDURE — 80048 BASIC METABOLIC PNL TOTAL CA: CPT | Performed by: INTERNAL MEDICINE

## 2023-03-26 PROCEDURE — 83735 ASSAY OF MAGNESIUM: CPT | Performed by: INTERNAL MEDICINE

## 2023-03-26 PROCEDURE — 96376 TX/PRO/DX INJ SAME DRUG ADON: CPT

## 2023-03-26 RX ADMIN — PIPERACILLIN AND TAZOBACTAM 3.38 G: 3; .375 INJECTION, POWDER, LYOPHILIZED, FOR SOLUTION INTRAVENOUS at 17:41

## 2023-03-26 RX ADMIN — DOCUSATE SODIUM 100 MG: 100 CAPSULE, LIQUID FILLED ORAL at 21:28

## 2023-03-26 RX ADMIN — MULTIPLE VITAMINS W/ MINERALS TAB 1 TABLET: TAB at 08:05

## 2023-03-26 RX ADMIN — VANCOMYCIN HYDROCHLORIDE 1000 MG: 5 INJECTION, POWDER, LYOPHILIZED, FOR SOLUTION INTRAVENOUS at 12:41

## 2023-03-26 RX ADMIN — ATORVASTATIN CALCIUM 10 MG: 10 TABLET, FILM COATED ORAL at 21:27

## 2023-03-26 RX ADMIN — ASPIRIN 81 MG: 81 TABLET, COATED ORAL at 08:05

## 2023-03-26 RX ADMIN — VANCOMYCIN HYDROCHLORIDE 1000 MG: 5 INJECTION, POWDER, LYOPHILIZED, FOR SOLUTION INTRAVENOUS at 00:28

## 2023-03-26 RX ADMIN — CHOLECALCIFEROL TAB 10 MCG (400 UNIT) 10 MCG: 10 TAB at 08:04

## 2023-03-26 RX ADMIN — Medication 100 MG: at 08:04

## 2023-03-26 RX ADMIN — PIPERACILLIN AND TAZOBACTAM 3.38 G: 3; .375 INJECTION, POWDER, LYOPHILIZED, FOR SOLUTION INTRAVENOUS at 08:43

## 2023-03-26 RX ADMIN — MIRTAZAPINE 45 MG: 30 TABLET, FILM COATED ORAL at 21:29

## 2023-03-26 RX ADMIN — PIPERACILLIN AND TAZOBACTAM 3.38 G: 3; .375 INJECTION, POWDER, LYOPHILIZED, FOR SOLUTION INTRAVENOUS at 00:39

## 2023-03-26 RX ADMIN — DOCUSATE SODIUM 100 MG: 100 CAPSULE, LIQUID FILLED ORAL at 08:05

## 2023-03-26 ASSESSMENT — ACTIVITIES OF DAILY LIVING (ADL)
ADLS_ACUITY_SCORE: 52
ADLS_ACUITY_SCORE: 56
ADLS_ACUITY_SCORE: 48
ADLS_ACUITY_SCORE: 52
ADLS_ACUITY_SCORE: 48
ADLS_ACUITY_SCORE: 52
ADLS_ACUITY_SCORE: 48
ADLS_ACUITY_SCORE: 52

## 2023-03-26 NOTE — PROGRESS NOTES
PRIMARY DIAGNOSIS: FTT, weakness  OUTPATIENT/OBSERVATION GOALS TO BE MET BEFORE DISCHARGE:  1. ADLs back to baseline: Yes     2. Activity and level of assistance: Ax2 for pivot transfer with gait belt    3. Pain status: Pain free.    4. Return to near baseline physical activity: Yes     Discharge Planner Nurse   Safe discharge environment identified: No, placement  Barriers to discharge: Yes, placement, IV ABX       Entered by: Erinn Dotson RN 03/26/2023 5:41 AM     Please review provider order for any additional goals.   Nurse to notify provider when observation goals have been met and patient is ready for discharge.      A/O x 3. [id number] with air mattress on bed, skin to buttocks/sacrum intact with some blanchable pink/redness. IV saline locked after ABX, received zosyn and vancomycin on shift. Mg protocol recheck this AM. Discharge TBD.

## 2023-03-26 NOTE — PROGRESS NOTES
Care Management Follow Up    Length of Stay (days): 0    Expected Discharge Date: 03/27/2023     Concerns to be Addressed: discharge planning         Additional Information:  CM following patient, assisting with discharge plans. Patient a poor historian. [facility] worker [facility]. Referrals out for LTC facilities as family no longer able to safely take care of him.     Decision Maker and Health Care Directive. It appears patient has a healthcare directive [facility] with the [facility]. Patient is a poor historian.  Not quite clear about decision maker. Defer to wife???   Final discharge pending acceptance to LTC facilities.      Mone Morales, RN

## 2023-03-26 NOTE — PROGRESS NOTES
[facility]    Medicine Progress Note - Hospitalist Service    Date of Admission:  3/20/2023    Assessment & Plan   68-year-old with history of hepatitis C, cerebrovascular accident, hypertension and former tobacco abuse, admitted on 3/21/2023 who was brought in by [facility] officials as a vulnerable adult in need of placement.  Incidentally found patient to have leukocytosis, multiple skin ulcers, hepatitis C antibody positive, elevated liver enzymes and hypomagnesemia.  Chest CT revealed new right lower lobe mass measuring 3.3 x 2.9 in the right middle lobe.  CT abdomen pelvis on 3/24 again noted mass in appearance was concerning for pulmonary abscess.  Blood cultures from 3/23 growing gram-positive cocci, verigene coag negative Staphlococcus lugdensis likely contaminant.    Pulmonary mass possible pulmonary abscess  Staphylococcus bacteremia likely contaminant  Continue Zosyn 3.375 g IV every 8 hours  Continue vancomycin pharmacy to dose.  Repeat set of blood cultures on 3/26/2023.  Echo normal.  Discontinue vancomycin.  Appreciate pulmonology recommendations.  Likely outpatient follow up with pulmonology after 2 weeks of oral augmentin.    Multiple decubitus ulcers  Continue wound cares.    Hypomagnesemia  Continue magnesium replacement protocol.     Elevated liver enzymes.   Hepatitis C antibody positive    Vulnerable adult status  Care management working with the [facility] for placement.       Diet: Regular Diet Adult    DVT Prophylaxis: Pneumatic Compression Devices  Gallagher Catheter: Not present  Lines: None     Cardiac Monitoring: None  Code Status: No CPR- Do NOT Intubate      Clinically Significant Risk Factors Present on Admission              # Hypoalbuminemia: Lowest albumin = 2.7 g/dL at 3/22/2023  7:41 AM, will monitor as appropriate                  Disposition Plan     Expected Discharge Date: 03/27/2023        Discharge Comments: referals out for TCU/LTC          Claude  DO Elizabeth  Hospitalist Service  [facility]  Securely message with [employer] (more info)  Text page via "[employer] Paging/Directory   ______________________________________________________________________    Interval History   No new complaints.  Patient was resting comfortably on my evaluation.  We are currently awaiting safe discharge plan.    Physical Exam   Vital Signs: Temp: 97.7  F (36.5  C) Temp src: Oral BP: 107/71 Pulse: 72   Resp: 19 SpO2: 93 % O2 Device: None (Room air)    Weight: 126 lbs 5.18 oz  GENERAL APPEARANCE ADULT: disheveled but no distress  HENT: Oral mucosa and posterior oropharynx normal, moist mucous membranes  NECK: No adenopathy,masses or thyromegaly  RESP: lungs clear to auscultation bilaterally  CV: regular rate and rhythm, no murmur, rub or gallop  ABDOMEN: normal bowel sounds, soft, nontender, no hepatosplenomegaly or other masses  LYMPHATICS: No cervical, or supraclavicular adenopathy  SKIN: no suspicious lesions or rashes  NEURO: Alert, oriented, speech and mentation normal  PSYCH: flat affect     Medical Decision Making     40 MINUTES SPENT BY ME on the date of service doing chart review, history, exam, documentation & further activities per the note.      Data     I have personally reviewed the following data over the past 24 hrs:    9.0  \   12.9 (L)   / 221     141 108 (H)    4.4 26 0.84 \       Imaging results reviewed over the past 24 hrs:   Recent Results (from the past 24 hour(s))   Echocardiogram Complete   Result Value    LVEF  60-65%    Narrative    [id number]  [id number]  [id number]  [id number]^ELIZABETH^[place] [address]     Name: KAREN WALL  MRN: [id number]  : 1955  Study Date: 2023 09:03 AM  Age: 68 yrs  Gender: Male  Patient Location: [facility]  Reason For Study: Other, Please Specify in Comments  Ordering Physician: ANGELICA BEJARANO  Performed By: CLOTILDE     BSA: 1.7 m2  Height:  70 in  Weight: 126 lb  HR: 69  ______________________________________________________________________________  Procedure  Complete Portable Echo Adult.  ______________________________________________________________________________  Interpretation Summary     There is mild concentric left ventricular hypertrophy.  The visual ejection fraction is 60-65%.  Left ventricular diastolic function is normal.  The right ventricle is normal in size and function.  No significant valve disease.  ______________________________________________________________________________  Left Ventricle  The left ventricle is normal in size. There is mild concentric left  ventricular hypertrophy. The visual ejection fraction is 60-65%. Left  ventricular diastolic function is normal. No regional wall motion  abnormalities noted.     Right Ventricle  The right ventricle is normal in size and function.     Atria  Normal left atrial size. Right atrial size is normal. Intact atrial septum.     Mitral Valve  Mitral valve leaflets appear normal. There is trace mitral regurgitation.     Tricuspid Valve  The tricuspid valve is not well visualized, but is grossly normal. There is  mild (1+) tricuspid regurgitation. Right ventricle systolic pressure estimate  normal.     Aortic Valve  Aortic valve leaflets appear normal. There is no evidence of aortic stenosis  or clinically significant aortic regurgitation.     Pulmonic Valve  The pulmonic valve is not well visualized.     Vessels  The aorta root is normal. The ascending aorta is Mildly dilated. IVC diameter  <2.1 cm collapsing >50% with sniff suggests a normal RA pressure of 3 mmHg.     Pericardium  There is no pericardial effusion.     ______________________________________________________________________________  MMode/2D Measurements & Calculations  IVSd: 1.4 cm  LVIDd: 4.4 cm  LVIDs: 3.2 cm  LVPWd: 1.1 cm     FS: 26.6 %  LV mass(C)d: 203.5 grams  LV mass(C)dI: 118.6 grams/m2  Ao root diam: 3.6  cm  LA dimension: 2.7 cm  asc Aorta Diam: 4.0 cm  LA/Ao: 0.75  LVOT diam: 1.9 cm  LVOT area: 2.9 cm2  LA Volume Indexed (AL/bp): 23.4 ml/m2  RV Base: 3.6 cm  RWT: 0.51  TAPSE: 2.6 cm     Doppler Measurements & Calculations  MV E max mauri: 43.7 cm/sec  MV A max mauri: 50.0 cm/sec  MV E/A: 0.87  MV max P.0 mmHg  MV mean P.80 mmHg  MV V2 VTI: 18.9 cm  MVA(VTI): 2.5 cm2  MV dec slope: 163.1 cm/sec2  MV dec time: 0.27 sec  Ao V2 max: 138.1 cm/sec  Ao max P.0 mmHg  Ao V2 mean: 88.8 cm/sec  Ao mean PG: 3.8 mmHg  Ao V2 VTI: 28.8 cm  MELVIN(I,D): 1.6 cm2  MELVIN(V,D): 1.7 cm2  LV V1 max P.6 mmHg  LV V1 max: 81.0 cm/sec  LV V1 VTI: 16.2 cm  SV(LVOT): 47.1 ml  SI(LVOT): 27.5 ml/m2  PA acc time: 0.10 sec  TR max mauri: 215.0 cm/sec  TR max P.5 mmHg  AV Mauri Ratio (DI): 0.59  MELVIN Index (cm2/m2): 0.95  E/E' av.0  Lateral E/e': 5.4  Medial E/e': 6.7  RV S Mauri: 9.7 cm/sec     ______________________________________________________________________________  Report approved by: Lauren Haro 2023 01:26 PM

## 2023-03-26 NOTE — PROGRESS NOTES
PRIMARY DIAGNOSIS: FTT, weakness  OUTPATIENT/OBSERVATION GOALS TO BE MET BEFORE DISCHARGE:  1. ADLs back to baseline: Yes    2. Activity and level of assistance: Ax2 for pivot transfer with gait belt    3. Pain status: Pain free.    4. Return to near baseline physical activity: Yes     Discharge Planner Nurse   Safe discharge environment identified: No, placement  Barriers to discharge: Yes, placement, IV ABX       Entered by: Erinn Dotson RN 03/26/2023 3:31 AM     Please review provider order for any additional goals.   Nurse to notify provider when observation goals have been met and patient is ready for discharge.
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
## 2023-03-26 NOTE — PROGRESS NOTES
ID consult received. Chart reviewed. Pulmonary managing lung mass. Blood culture grew a contaminant. No need for vancomycin. Please call us if full consult is needed.     Mario Alex, ID 3/26/23

## 2023-03-26 NOTE — PROGRESS NOTES
PRIMARY DIAGNOSIS: FTT, weakness  OUTPATIENT/OBSERVATION GOALS TO BE MET BEFORE DISCHARGE:  1. ADLs back to baseline: Yes    2. Activity and level of assistance: Ax2 for pivot transfer with gait belt    3. Pain status: Pain free.    4. Return to near baseline physical activity: Yes     Discharge Planner Nurse   Safe discharge environment identified: No, placement  Barriers to discharge: Yes, placement, IV ABX       Entered by: Erinn Dotson RN 03/26/2023 3:30 AM     Please review provider order for any additional goals.   Nurse to notify provider when observation goals have been met and patient is ready for discharge.

## 2023-03-27 ENCOUNTER — APPOINTMENT (OUTPATIENT)
Dept: PHYSICAL THERAPY | Facility: CLINIC | Age: 68
DRG: 178 | End: 2023-03-27
Attending: INTERNAL MEDICINE
Payer: MEDICARE

## 2023-03-27 ENCOUNTER — APPOINTMENT (OUTPATIENT)
Dept: OCCUPATIONAL THERAPY | Facility: CLINIC | Age: 68
DRG: 178 | End: 2023-03-27
Attending: INTERNAL MEDICINE
Payer: MEDICARE

## 2023-03-27 ENCOUNTER — APPOINTMENT (OUTPATIENT)
Dept: SPEECH THERAPY | Facility: CLINIC | Age: 68
DRG: 178 | End: 2023-03-27
Attending: INTERNAL MEDICINE
Payer: MEDICARE

## 2023-03-27 LAB
ANION GAP SERPL CALCULATED.3IONS-SCNC: 5 MMOL/L (ref 5–18)
BUN SERPL-MCNC: 13 MG/DL (ref 8–22)
CALCIUM SERPL-MCNC: 8.9 MG/DL (ref 8.5–10.5)
CHLORIDE BLD-SCNC: 108 MMOL/L (ref 98–107)
CO2 SERPL-SCNC: 26 MMOL/L (ref 22–31)
CREAT SERPL-MCNC: 0.8 MG/DL (ref 0.7–1.3)
ERYTHROCYTE [DISTWIDTH] IN BLOOD BY AUTOMATED COUNT: 13.6 % (ref 10–15)
GFR SERPL CREATININE-BSD FRML MDRD: >90 ML/MIN/1.73M2
GLUCOSE BLD-MCNC: 90 MG/DL (ref 70–125)
HCT VFR BLD AUTO: 37.4 % (ref 40–53)
HGB BLD-MCNC: 12.3 G/DL (ref 13.3–17.7)
MAGNESIUM SERPL-MCNC: 1.7 MG/DL (ref 1.8–2.6)
MCH RBC QN AUTO: 30.4 PG (ref 26.5–33)
MCHC RBC AUTO-ENTMCNC: 32.9 G/DL (ref 31.5–36.5)
MCV RBC AUTO: 92 FL (ref 78–100)
PLATELET # BLD AUTO: 228 10E3/UL (ref 150–450)
POTASSIUM BLD-SCNC: 4.1 MMOL/L (ref 3.5–5)
RBC # BLD AUTO: 4.05 10E6/UL (ref 4.4–5.9)
SODIUM SERPL-SCNC: 139 MMOL/L (ref 136–145)
WBC # BLD AUTO: 9.2 10E3/UL (ref 4–11)

## 2023-03-27 PROCEDURE — 97164 PT RE-EVAL EST PLAN CARE: CPT | Mod: GP

## 2023-03-27 PROCEDURE — 250N000013 HC RX MED GY IP 250 OP 250 PS 637: Performed by: HOSPITALIST

## 2023-03-27 PROCEDURE — 96376 TX/PRO/DX INJ SAME DRUG ADON: CPT

## 2023-03-27 PROCEDURE — 97166 OT EVAL MOD COMPLEX 45 MIN: CPT | Mod: GO

## 2023-03-27 PROCEDURE — G0378 HOSPITAL OBSERVATION PER HR: HCPCS

## 2023-03-27 PROCEDURE — 80048 BASIC METABOLIC PNL TOTAL CA: CPT | Performed by: INTERNAL MEDICINE

## 2023-03-27 PROCEDURE — 99207 PR CDG-CUT & PASTE-POTENTIAL IMPACT ON LEVEL: CPT | Performed by: INTERNAL MEDICINE

## 2023-03-27 PROCEDURE — 250N000011 HC RX IP 250 OP 636: Performed by: INTERNAL MEDICINE

## 2023-03-27 PROCEDURE — 96372 THER/PROPH/DIAG INJ SC/IM: CPT

## 2023-03-27 PROCEDURE — 85027 COMPLETE CBC AUTOMATED: CPT | Performed by: INTERNAL MEDICINE

## 2023-03-27 PROCEDURE — 92526 ORAL FUNCTION THERAPY: CPT | Mod: GN

## 2023-03-27 PROCEDURE — 97535 SELF CARE MNGMENT TRAINING: CPT | Mod: GO

## 2023-03-27 PROCEDURE — 83735 ASSAY OF MAGNESIUM: CPT | Performed by: INTERNAL MEDICINE

## 2023-03-27 PROCEDURE — 36415 COLL VENOUS BLD VENIPUNCTURE: CPT | Performed by: INTERNAL MEDICINE

## 2023-03-27 PROCEDURE — 92610 EVALUATE SWALLOWING FUNCTION: CPT | Mod: GN

## 2023-03-27 PROCEDURE — 250N000013 HC RX MED GY IP 250 OP 250 PS 637: Performed by: INTERNAL MEDICINE

## 2023-03-27 PROCEDURE — 99232 SBSQ HOSP IP/OBS MODERATE 35: CPT | Performed by: INTERNAL MEDICINE

## 2023-03-27 RX ADMIN — CHOLECALCIFEROL TAB 10 MCG (400 UNIT) 10 MCG: 10 TAB at 08:35

## 2023-03-27 RX ADMIN — MULTIPLE VITAMINS W/ MINERALS TAB 1 TABLET: TAB at 08:35

## 2023-03-27 RX ADMIN — Medication 100 MG: at 08:35

## 2023-03-27 RX ADMIN — ATORVASTATIN CALCIUM 10 MG: 10 TABLET, FILM COATED ORAL at 22:20

## 2023-03-27 RX ADMIN — PIPERACILLIN AND TAZOBACTAM 3.38 G: 3; .375 INJECTION, POWDER, LYOPHILIZED, FOR SOLUTION INTRAVENOUS at 01:31

## 2023-03-27 RX ADMIN — DOCUSATE SODIUM 100 MG: 100 CAPSULE, LIQUID FILLED ORAL at 08:35

## 2023-03-27 RX ADMIN — PIPERACILLIN AND TAZOBACTAM 3.38 G: 3; .375 INJECTION, POWDER, LYOPHILIZED, FOR SOLUTION INTRAVENOUS at 08:35

## 2023-03-27 RX ADMIN — ACETAMINOPHEN 650 MG: 325 TABLET ORAL at 01:33

## 2023-03-27 RX ADMIN — MIRTAZAPINE 45 MG: 30 TABLET, FILM COATED ORAL at 22:20

## 2023-03-27 RX ADMIN — ASPIRIN 81 MG: 81 TABLET, COATED ORAL at 08:35

## 2023-03-27 RX ADMIN — PIPERACILLIN AND TAZOBACTAM 3.38 G: 3; .375 INJECTION, POWDER, LYOPHILIZED, FOR SOLUTION INTRAVENOUS at 17:30

## 2023-03-27 RX ADMIN — DOCUSATE SODIUM 100 MG: 100 CAPSULE, LIQUID FILLED ORAL at 22:20

## 2023-03-27 ASSESSMENT — ACTIVITIES OF DAILY LIVING (ADL)
ADLS_ACUITY_SCORE: 52
ADLS_ACUITY_SCORE: 48
ADLS_ACUITY_SCORE: 52

## 2023-03-27 NOTE — PROGRESS NOTES
03/27/23 0916   Appointment Info   Signing Clinician's Name / Credentials (PT) Carmen Shultz PT   Living Environment   People in Home child(jennifer), adult  (dtr)   Current Living Arrangements house   Home Accessibility stairs to enter home   Number of Stairs, Main Entrance 3   Stair Railings, Main Entrance railings safe and in good condition   Living Environment Comments pt reports living with dtr prior to hospital admission   Self-Care   Equipment Currently Used at Home wheelchair, manual   Activity/Exercise/Self-Care Comment dtr assist with ADL's/IADL's; pt reports that he last walked a few years ago   General Information   Onset of Illness/Injury or Date of Surgery 03/20/23   Referring Physician Dr. Johnson   Patient/Family Therapy Goals Statement (PT) none stated   Pertinent History of Current Problem (include personal factors and/or comorbidities that impact the POC) Matheus Nieves is a 68 year old old male with past medical history significant for a history of alcoholism, previous stroke, and hypertension, [facility] he [facility] awaiting long-term placement, until about 4-5 days ago, when his stepdaughter decided to check him out AMA on account of inadequate care and inability to place patient.  Patient is a poor historian, and unable to articulate any tangible history.   Existing Precautions/Restrictions fall   Cognition   Affect/Mental Status (Cognition) flat/blunted affect   Orientation Status (Cognition) oriented to;person;place;time   Follows Commands (Cognition) WFL   Range of Motion (ROM)   Range of Motion ROM is WFL   Strength (Manual Muscle Testing)   Strength (Manual Muscle Testing) Deficits observed during functional mobility   Bed Mobility   Bed Mobility supine-sit   Supine-Sit [place] (Bed Mobility) supervision   Assistive Device (Bed Mobility) bed rails   Transfers   Transfers sit-stand transfer;bed-chair transfer;toilet transfer   Bed-Chair  Transfer   Assistive Device (Bed-Chair Transfers) walker, front-wheeled   Bed-Chair [place] (Transfers) minimum assist (75% patient effort);verbal cues;nonverbal cues (demo/gesture)   Sit-Stand Transfer   Sit-Stand [place] (Transfers) minimum assist (75% patient effort);verbal cues;nonverbal cues (demo/gesture)   Assistive Device (Sit-Stand Transfers) walker, front-wheeled   Toilet Transfer   [place] Level (Toilet Transfer) minimum assist (75% patient effort);verbal cues;nonverbal cues (demo/gesture)   Assistive Device (Toilet Transfer) walker, front-wheeled;commode chair   Type (Toilet Transfer) sit-stand;stand-sit   Balance   Balance Comments sitting edge of bed supervision no LOB static; standing static cga/sba no LOB; assist for hygiene post toileting   Clinical Impression   Criteria for Skilled Therapeutic Intervention Evaluation only   Influenced by the following impairments decreased balance, strength   Functional limitations due to impairments gait, transfers, stairs   Clinical Presentation (PT Evaluation Complexity) Stable/Uncomplicated   Clinical Presentation Rationale pt presents as medically diagnosed   Clinical Decision Making (Complexity) low complexity   Anticipated Equipment Needs at Discharge (PT) wheelchair;walker, rolling   Risk & Benefits of therapy have been explained evaluation/treatment results reviewed;patient   PT Total Evaluation Time   PT Eval, Re-eval Minutes ([id number]) 20   PT Discharge Planning   PT Plan d/c PT   PT Discharge Recommendation (DC Rec) Long term care facility   PT Rationale for DC Rec Patient is at his functional mobility baseline and has been waiting for long term placement.  No further skilled PT needs at this time   PT Brief overview of current status min assist transfers with walker   Total Session Time   Total Session Time (sum of timed and untimed services) 20

## 2023-03-27 NOTE — PROGRESS NOTES
03/27/23 1315   Appointment Info   Signing Clinician's Name / Credentials (OT) Stacey Winkler, OTR/L   Quick Adds   Quick Adds Certification   Living Environment   People in Home child(jennifer), adult  (Dtr = Amber)   Current Living Arrangements house   Self-Care   Usual Activity Tolerance poor   Current Activity Tolerance poor   Equipment Currently Used at Home wheelchair, manual;walker, rolling   Instrumental Activities of Daily Living (IADL)   IADL Comments Dtr cares for all IADLs   General Information   Onset of Illness/Injury or Date of Surgery 03/20/23   Referring Physician Dr. Claude Johnson   Patient/Family Therapy Goal Statement (OT) None stated by Pt.   Additional Occupational Profile Info/Pertinent History of Current Problem Adm with volnerable Adult.  PMH:  Hep C, CVA, HTN,Dysphagia, decubitus ulcers, Hypomagnesia.   Existing Precautions/Restrictions (S)  fall   Cognitive Status Examination   Orientation Status person;time;place   Cognitive Screens/Assessments   Cognitive Assessments Completed [facility] Mental Status Exam (UMS):  Total Score out of /30 15/30   UMS Norms 1-20 equals dementia   SLUMS Domains assessed: orientation, memory, attention, executive functions   SLUMS Interpretation Pt sitting up in the chair during this cogntive screen.  No glasses available.  Today's scoare of 15/30 indicates decreased cognition in the area of dementia.  Note:  Pt is [place] and he did not have his reading glasses available to wear.  Recommend reapeat of this screen once Pt is discharged from the acute care setting.  Would also recommend 24 hour care.   Visual Perception   Visual Impairment/Limitations corrective lenses for reading  (Not here in the hospital)   Range of Motion Comprehensive   General Range of Motion upper extremity range of motion deficits identified   Comment, General Range of Motion due to stroke.   Bed Mobility   Bed Mobility supine-sit;sit-supine   Supine-Sit [place]  (Bed Mobility) contact guard   Sit-Supine [place] (Bed Mobility) contact guard   Assistive Device (Bed Mobility) bed rails   Transfers   Transfers bed-chair transfer;sit-stand transfer   Transfer Skill: Bed to Chair/Chair to Bed   Bed-Chair [place] (Transfers) minimum assist (75% patient effort);2 person assist   Assistive Device (Bed-Chair Transfers) rolling walker   Sit-Stand Transfer   Sit-Stand [place] (Transfers) minimum assist (75% patient effort);2 person assist   Assistive Device (Sit-Stand Transfers) walker, front-wheeled   Balance   Balance Assessment standing balance: static   Balance Comments fair   Activities of Daily Living   BADL Assessment/Intervention lower body dressing   Lower Body Dressing Assessment/Training   Position (Lower Body Dressing) supported sitting;supported standing   Comment, (Lower Body Dressing) While sitting, Pt was able to don and doff his socks.   [place] (Lower Body Dressing) minimum assist (75% patient effort)   Clinical Impression   Criteria for Skilled Therapeutic Interventions Met (OT) Yes, treatment indicated   OT Diagnosis decreased cognition, decreased indep with ADLs and trsf   OT Problem List-Impairments impacting ADL problems related to;cognition;balance;hearing;mobility   Assessment of Occupational Performance 3-5 Performance Deficits   Identified Performance Deficits dressing, toileting, bathing, G/H and trsfs and cognition.   Planned Therapy Interventions (OT) ADL retraining;cognition;transfer training   Clinical Decision Making Complexity (OT) moderate complexity   Risk & Benefits of therapy have been explained evaluation/treatment results reviewed;patient   OT Total Evaluation Time   OT Eval, Moderate Complexity Minutes (66205) 15   Therapy Certification   Medical Diagnosis Volnerable Adult   Start of Care Date 03/27/23   Certification date from 03/27/23   Certification date to 04/27/23   OT Goals   Therapy Frequency (OT) Daily   OT  Predicted Duration/Target Date for Goal Attainment 04/03/23   OT Goals Hygiene/Grooming;Lower Body Dressing;Transfers;Toilet Transfer/Toileting;Cognition   OT: Hygiene/Grooming supervision/stand-by assist   OT: Lower Body Dressing Supervision/stand-by assist   OT: Transfer Minimal assist   OT: Toilet Transfer/Toileting Minimal assist   OT: Cognitive Patient/caregiver will verbalize understanding of cognitive assessment results/recommendations as needed for safe discharge planning  (With VC as needed)   Interventions   Interventions Quick Adds Self-Care/Home Management   Self-Care/Home Management   Self-Care/Home Mgmt/ADL, Compensatory, Meal Prep Minutes (34604) 23   Symptoms Noted During/After Treatment (Meal Preparation/Planning Training) none   Treatment Detail/Skilled Intervention Pt in bed when therapist arrived.  Minimal verbalizations.  When given enough time, Pt does respond verbally.  Pt is also [place], so extra time needed to repeat and clarify commands/directions.  Worked on bed mobility using HOB elevated and use of bedside rails.  Pt was CGA of one for supine to sit EOB.  Trsf from sit to stand with Min A of 2 using FWW.  Unsteady gait, VC for correct hand placement.  VC to back all the way up to the chair prior to sitting.  Needs further coaching re: correct hand placement.  From the chair, Pt worked on L/B dressing and eating.  See below.  Worked on trsf from chair to bed again with min A of 2 for safety due to unsteady gait with use of a walker.  Pt was able to lift his legs up into the bed by himself.  Max A of 2 to boost up in the bed.  NA present at end of session.  Bed alarm on and call light within reach.   [place] (Eating/Self-Feeding Training) stand-by assist   Assistance (Eating/Self-Feeding Training) set-up required;supervision;verbal cues  (Vc to slow down his eating.)   Lower Body Dressing Training Assistance stand-by assist   Lower Body Dressing Training Assistance  supervision;verbal cues  (to don and doff his socks while sitting in the chair.)   OT Discharge Planning   OT Plan Work on functional trsfs to chair, commode.  Set up for G/H cares in sitting.  Review daily R/O.  Begin home exercise program for bilat U/E.   OT Discharge Recommendation (DC Rec) (S)  Transitional Care Facility   OT Rationale for DC Rec Pt has rehab potential and would benefit from further OT to increase indep with all ADLs and trsfs.  Continue to further monitor cognition - knowing Pt is [place].   OT Brief overview of current status Min A of 2 with close trsfs using FWW.  SBA with donning and doffing socks while sitting.  Needs extra time to respond.   Total Session Time   Timed Code Treatment Minutes 23   Total Session Time (sum of timed and untimed services) 38    [facility]  OUTPATIENT OCCUPATIONAL THERAPY  EVALUATION  PLAN OF TREATMENT FOR OUTPATIENT REHABILITATION  (COMPLETE FOR INITIAL CLAIMS ONLY)  Patient's Last Name, First Name, M.I.  YOB: 1955  Matheus Nieves                          Provider's Name  [facility] Medical Record No.  [id number]                             Onset Date:  03/20/23   Start of Care Date:  03/27/23   Type:     ___PT   _X_OT   ___SLP Medical Diagnosis:  Volnerable Adult                    OT Diagnosis:  decreased cognition, decreased indep with ADLs and trsf Visits from SOC:  1     See note for plan of treatment, functional goals and certification details    I CERTIFY THE NEED FOR THESE SERVICES FURNISHED UNDER        THIS PLAN OF TREATMENT AND WHILE UNDER MY CARE     (Physician co-signature of this document indicates review and certification of the therapy plan).

## 2023-03-27 NOTE — PROGRESS NOTES
Care Management Follow Up    Length of Stay (days): 0    Expected Discharge Date: 03/30/2023     Concerns to be Addressed: discharge planning       Patient plan of care discussed at interdisciplinary rounds: Yes    Anticipated Discharge Disposition:  TBD  Disposition Comments: TBD    Anticipated Discharge Services: Transportation Services    Anticipated Discharge DME:  TBD      Education Provided on the Discharge Plan:  AVS per bedside RN    Patient/Family in Agreement with the Plan: yes    Referrals Placed by CM/SW: post acute care        Additional Information:  Chart reviewed. CM following. Long term care referrals pending. CM will follow.     PT recommendations: Long term care facility  OT recommendations: Transitional Care Facility    [facility] pending  [facility]  [facility]  [facility] ()  [facility] ()  [facility] ([facility])  THE [facility] ()  [facility] ([facility])    Zena Merlos RN

## 2023-03-27 NOTE — PROGRESS NOTES
Urethritis in Men     An inflamed urethra can cause pain during urination.   The urethra is the tube that runs from the bladder through the penis. When the urethra is inflamed, it is called urethritis. The urethra becomes swollen and causes burning pain when you urinate. Other symptoms of urethritis may include itching or tingling of the penis or pus discharge from the penis. You may also have pain with sex and masturbation. Some men may not have symptoms.  What causes urethritis?  Urethritis can be caused by a bacterial or viral infection. Such an infection can lead to conditions such as a urinary tract infection (UTI) or sexually transmitted diseases (STD). Urethritis can also be caused by injury or sensitivity or allergy to chemicals in lotions and other products.  How is urethritis diagnosed?  Your healthcare provider will examine you and ask about your symptoms and health history. You may also have one or more of the following tests:  · Urine test to take samples of urine and have them checked for problems.  · Blood test to take a sample of blood and have it checked for problems.  · Urethral discharge to take a sample of fluid from inside the urethra. A cotton swab is inserted into the opening of the penis and into the urethra.  · Cystoscopy to allow the healthcare provider to look for problems in the urinary tract. The test uses a thin, flexible telescope called a cystoscope with a light and camera attached. The scope is inserted into the urethra.  How is urethritis treated?  Treatment depends on the cause of urethritis. If its due to a bacterial infection, antibiotics (medicines that fight infection) will be given. Your healthcare provider can tell you more about your treatment options. In the meantime, your symptoms can be treated. To relieve pain and swelling, anti-inflammatory medications, such as ibuprofen, may be given. Untreated, symptoms may get worse. Also, scar tissue can form in the urethra,  PRIMARY DIAGNOSIS: GENERALIZED WEAKNESS    OUTPATIENT/OBSERVATION GOALS TO BE MET BEFORE DISCHARGE  1. Orthostatic performed: No    2. Tolerating PO medications: Yes    3. Return to near baseline physical activity: No    4. Cleared for discharge by consultants (if involved): No    Discharge Planner Nurse   Safe discharge environment identified: No  Barriers to discharge: Yes IV abx, needs LTC or TCU placement.       Entered by: Amanuel Pickett RN 03/26/2023 7:18 PM    Patient is total care requires extensive assist of 2 with cares. Incontinent of bowel. Patient turned and repositoned every 2 hours.  Please review provider order for any additional goals.   Nurse to notify provider when observation goals have been met and patient is ready for discharge.   causing it to narrow.  When to call your healthcare provider   Call your healthcare provider right away if you have any of the following:  · Fever of 100.4°F (38.0°C) or higher   · Blood in the urine or semen  · Burning pain with urination  · Increased urge to urinate  · Discharge from the penis  · Itching, tenderness, or swelling in the penis or groin  · Pain during sex or masturbation   Preventing STDs  When it comes to sex, its important to take care and be safe. Any sexual contact with the penis, vagina, anus, or mouth can spread an STD. The only sure way to prevent STDs is not having sex. But there are ways to make sex safer. Use a latex condom each time you have sex. And talk to your partner about STDs before you have sex.  Date Last Reviewed: 1/1/2017  © 8545-7343 [employer]. [address] [place] [address]. All rights reserved. This information is not intended as a substitute for professional medical care. Always follow your healthcare professional's instructions.        WE WILL CALL YOU IN SEVERAL DAYS WITH RESULT OF URINE CULTURE, AND TEST FOR TRICHOMONAS.    AS WE DISCUSSED, WE ARE UNABLE TO CHECK FOR GONORRHEA AND CHLAMYDIA, BUT TREATED YOU FOR THAT TODAY.    RECHECK HERE OR WITH PCP IF NOT IMPROVING.

## 2023-03-27 NOTE — PROGRESS NOTES
"Clinical Swallow Evaluation with Speech Pathology     03/27/23 1000   Appointment Info   Signing Clinician's Name / Credentials (SLP) Charlotte Stoddard MA, CCC-SLp   General Information   Onset of Illness/Injury or Date of Surgery 03/20/23   Referring Physician Claude Johnson, DO   Pertinent History of Current Problem   Per provider documentation, \"68-year-old with history of hepatitis C, cerebrovascular accident, hypertension and former tobacco abuse, admitted on 3/21/2023 who was brought in by [facility] officials as a vulnerable adult in need of placement.  Incidentally found patient to have leukocytosis, multiple skin ulcers, hepatitis C antibody positive, elevated liver enzymes and hypomagnesemia.  Chest CT revealed new right lower lobe mass measuring 3.3 x 2.9 in the right middle lobe.  CT abdomen pelvis on 3/24 again noted mass in appearance was concerning for pulmonary abscess.  Blood cultures from 3/23 growing gram-positive cocci, verigene coag negative Staphlococcus lugdensis likely contaminant.\"      Speech Therapy has been asked to see patient due to concerns of coughing with PO intake.     General Observations Patient alert and sitting up in chair upon arrival. He was essentially nonverbal throughout session. He provided inconsistent yes/no responses via head nod/shake and used gestures intermittently.   Type of Evaluation   Type of Evaluation Swallow Evaluation   Oral Motor   Oral Musculature generally intact   Mucosal Quality adequate   Dentition (Oral Motor)   Comment, Dentition (Oral Motor) Patient's remaining teeth are in poor condition. He does indicate that this impacts his ability to chew hard/crunchy foods.   Dentition (Oral Motor) significant number of missing teeth;poor condition;dental cavities apparent;dentition impacts chewing ability   Facial Symmetry (Oral Motor)   Facial Symmetry (Oral Motor) WNL   Lip Function (Oral Motor)   Lip Range of Motion (Oral Motor) WNL   Lip Strength " (Oral Motor) WNL   Tongue Function (Oral Motor)   Tongue Strength (Oral Motor) WFL   Tongue Coordination/Speed (Oral Motor) WNL   Tongue ROM (Oral Motor) WNL   Jaw Function (Oral Motor)   Jaw Function (Oral Motor) WNL   Cough/Swallow/Gag Reflex (Oral Motor)   Volitional Throat Clear/Cough (Oral Motor) WNL   Volitional Swallow (Oral Motor) mildly delayed   Vocal Quality/Secretion Management (Oral Motor)   Vocal Quality (Oral Motor) WFL   Secretion Management (Oral Motor) WNL   Comment, Vocal Quality/Secretion Management (Oral Motor) Difficult to evaluate as patient was essentially nonverbal during session. Vocal quality was WFL for the few words he spoke.   General Swallowing Observations   Past History of Dysphagia None per cursory review of previous records, however, records in Care Everywhere appear somewhat limited.   Respiratory Support (General Swallowing Observations) none   Current Diet/Method of Nutritional Intake (General Swallowing Observations, NIS) regular diet;thin liquids (level 0)   Swallowing Evaluation Clinical swallow evaluation   Clinical Swallow Evaluation   Feeding Assistance set up only required   Clinical Swallow Evaluation Textures Trialed thin liquids;mildly thick liquids;pureed;solid foods   Clinical Swallow Eval: Thin Liquid Texture Trial   Mode of Presentation, Thin Liquids cup;straw;self-fed   Volume of Liquid or Food Presented 3 sips   Oral Phase of Swallow WFL   Pharyngeal Phase of Swallow coughing/choking   Diagnostic Statement Subjectively, swallow response appeared delayed. Patient presented with consistent coughing with sips of thin liquid.   Clinical Swallow Eval: Mildly Thick Liquids   Mode of Presentation cup;self-fed   Volume Presented 6 sips   Oral Phase WFL   Pharyngeal Phase intact  (No overt s/sx of aspiration)   Diagnostic Statement Subjectively, swallow response appeared more timely with mildly thick liquid compared to thin. No overt clinical s/sx of aspiration observed.    Clinical Swallow Evaluation: Puree Solid Texture Trial   Mode of Presentation, Puree spoon;self-fed   Volume of Puree Presented 3 teaspoons   Oral Phase, Puree WFL   Pharyngeal Phase, Puree intact  (No overt s/sx of aspiration)   Diagnostic Statement Swallow function appeared WNL with puree consistency. No overt clinical s/sx of aspiration observed.   Clinical Swallow Evaluation: Solid Food Texture Trial   Mode of Presentation self-fed   Volume Presented 4 bites  (Chocolate mini donut brought in from home)   Oral Phase WFL   Pharyngeal Phase intact   Diagnostic Statement Patient demonstrated effective and timely mastication of an Easy to Chew item. No overt clinical s/sx of aspiration observed. No oral stasis noted after swallows.   Esophageal Phase of Swallow   Patient reports or presents with symptoms of esophageal dysphagia No   Swallowing Recommendations   Diet Consistency Recommendations easy to chew (level 7);mildly thick liquids (level 2)   Supervision Level for Intake close supervision needed   Mode of Delivery Recommendations bolus size, small;slow rate of intake;no straws   Monitoring/Assistance Required (Eating/Swallowing) stop eating activities when fatigue is present;monitor for cough or change in vocal quality with intake   Recommended Feeding/Eating Techniques (Swallow Eval) maintain upright sitting position for eating;minimize distractions during oral intake;set-up and prepare tray   Medication Administration Recommendations, Swallowing (SLP) Recommend that pills be given one at a time (whole) mixed in applesauce or pudding.   Instrumental Assessment Recommendations instrumental evaluation not recommended at this time   General Therapy Interventions   Planned Therapy Interventions Dysphagia Treatment   Dysphagia treatment Modified diet education;Instruction of safe swallow strategies   Clinical Impression   Criteria for Skilled Therapeutic Interventions Met (SLP Eval) Yes, treatment indicated   SLP  Diagnosis Dysphagia   Risks & Benefits of therapy have been explained evaluation/treatment results reviewed;care plan/treatment goals reviewed;risks/benefits reviewed;current/potential barriers reviewed;participants voiced agreement with care plan;participants included;patient  (Question patient's true comprehension given suspected cognitive impairment.)   Clinical Impression Comments   Clinical swallow evaluation completed per provider order. Patient presents with mild oral dysphagia related to poor dentition. Suspect at least mild pharyngeal dysphagia given presence of clinical s/sx of aspiration with thin liquid. At this time, recommend downgrade diet to Easy to Chew textures (Level 7) and mildly thick liquids (Level 2). Patient to utilize the safe swallowing strategies above to further minimize aspiration risk. Also recommend instrumental evaluation to further evaluate swallow physiology & function.     SLP Total Evaluation Time   Eval: oral/pharyngeal swallow function, clinical swallow Minutes (41893) 50   SLP Goals   Therapy Frequency (SLP Eval) 4 times/wk   SLP Predicted Duration/Target Date for Goal Attainment 04/03/23   SLP Goals Swallow   SLP: Safely tolerate diet without signs/symptoms of aspiration Easy to chew diet;Mildly thick liquids;With use of swallow precautions;Independently   Interventions   Interventions Quick Adds Swallowing Dysfunction   Swallowing Dysfunction &/or Oral Function for Feeding   Treatment of Swallowing Dysfunction &/or Oral Function for Feeding Minutes (73117) 3   SLP Discharge Planning   SLP Plan VFSS Tuesday   SLP Discharge Recommendation Long term care facility   SLP Rationale for DC Rec Patient's swallow function is impaired but may be at/near new baseline. Cognition will likely limit pt's ability to independently care for himself.   SLP Brief overview of current status  Recommend diet of Easy to Chew textures and mildly thick liquids. Pt to sit fully upright, eat slowly, and  take small bites/sips. No straws w/ liquids d/t increased aspiration risk.   Total Session Time   Total Session Time (sum of timed and untimed services) 29

## 2023-03-27 NOTE — PROGRESS NOTES
PRIMARY DIAGNOSIS: GENERALIZED WEAKNESS    OUTPATIENT/OBSERVATION GOALS TO BE MET BEFORE DISCHARGE  1. Orthostatic performed: N/A    2. Tolerating PO medications: Yes    3. Return to near baseline physical activity: Yes    4. Cleared for discharge by consultants (if involved): No    Discharge Planner Nurse   Safe discharge environment identified: No  Barriers to discharge: Yes       Entered by: Radha Jovel RN 03/27/2023 11:03 AM     Worked with PT, 1A pivot. Speech updated diet to Easy to Chew (Level 7) with Mildly Thick (Level 2) Liquids.

## 2023-03-27 NOTE — PLAN OF CARE
Problem: Plan of Care - These are the overarching goals to be used throughout the patient stay.    Goal: Optimal Comfort and Wellbeing  Outcome: Progressing   Goal Outcome Evaluation:    Patient is alert and oriented times 3, disoriented to situation. Very flat affect. Worked with both PT/OT today. No complaints of pain. IV Zosyn given. Speech downgraded patient's diet due to risk of aspiration. Patient is now an easy chew diet with mildly thick liquids (level 2), no straws. Plan to do a video swallow tomorrow morning at 0830. Patient should be down at X-ray at that time. Primofit is in place. Call light within reach, all alarms on.

## 2023-03-27 NOTE — PROGRESS NOTES
[facility]    Medicine Progress Note - Hospitalist Service    Date of Admission:  3/20/2023    Assessment & Plan   68-year-old with history of hepatitis C, cerebrovascular accident, hypertension and former tobacco abuse, admitted on 3/21/2023 by [facility] officials as a vulnerable adult in need of placement.  Incidentally found patient to have leukocytosis, multiple skin ulcers, hepatitis C antibody positive, elevated liver enzymes and hypomagnesemia.  Chest CT revealed new right lower lobe mass measuring 3.3 x 2.9 in the right middle lobe.  CT abdomen pelvis on 3/24 again noted mass in appearance was concerning for pulmonary abscess.  Blood cultures from 3/23 growing gram-positive cocci, verigene coag negative Staphlococcus lugdensis likely contaminant.  Pulmonology recommending 2 weeks of oral antibiotics with Augmentin and outpatient follow up in pulmonology clinic.  Awaiting LTC/TCU placement referrals.     Pulmonary mass possible pulmonary abscess  Staphylococcus bacteremia likely contaminant  Continue Zosyn 3.375 g IV every 8 hours  Continue vancomycin pharmacy to dose.  Repeat set of blood cultures on 3/26/2023.  Echo normal.  Discontinue vancomycin.  Appreciate pulmonology recommendations.  Likely outpatient follow up with pulmonology after 2 weeks of oral augmentin.    Dysphagia  Aspiration risk.    Order video swallow study on 3/27/23.   Continue aspiration precautions.  Continue modified diet per Speech pathology.    Multiple decubitus ulcers  Continue wound cares.    Hypomagnesemia  Continue magnesium replacement protocol.     Elevated liver enzymes.   Hepatitis C antibody positive    Vulnerable adult status  Care management working with the [facility] for placement.         Diet:  Easy to chew diet, mild thick liquids.  DVT Prophylaxis: Pneumatic Compression Devices  Gallagher Catheter: Not present  Lines: None     Cardiac Monitoring: None  Code Status: No CPR- Do NOT Intubate       Clinically Significant Risk Factors Present on Admission              # Hypoalbuminemia: Lowest albumin = 2.7 g/dL at 3/22/2023  7:41 AM, will monitor as appropriate                  Disposition Plan      Expected Discharge Date: 03/30/2023        Discharge Comments: referals out for TCU/LTC          Claude Johnson DO  Hospitalist Service  [facility]  Securely message with [employer] (more info)  Text page via [employer] Paging/Directory   ______________________________________________________________________    Interval History   Patient with coughing after applesauce this morning.  No new complaints.     Physical Exam   Vital Signs: Temp: 97.1  F (36.2  C) Temp src: Axillary BP: 113/79 Pulse: 68   Resp: 18 SpO2: 97 % O2 Device: None (Room air)    Weight: 128 lbs 0 oz    GENERAL APPEARANCE ADULT: disheveled but no distress, nonverbal.  HENT: Oral mucosa and posterior oropharynx normal, moist mucous membranes  NECK: No adenopathy,masses or thyromegaly  RESP: lungs clear to auscultation bilaterally  CV: regular rate and rhythm, no murmur, rub or gallop  ABDOMEN: normal bowel sounds, soft, nontender, no hepatosplenomegaly or other masses  LYMPHATICS: No cervical, or supraclavicular adenopathy  SKIN: no suspicious lesions or rashes  NEURO: Alert  PSYCH: flat affect    Medical Decision Making     38 MINUTES SPENT BY ME on the date of service doing chart review, history, exam, documentation & further activities per the note.      Data     I have personally reviewed the following data over the past 24 hrs:    9.2  \   12.3 (L)   / 228     139 108 (H) 13 /  90   4.1 26 0.80 \       Imaging results reviewed over the past 24 hrs:   No results found for this or any previous visit (from the past 24 hour(s)).

## 2023-03-27 NOTE — PROGRESS NOTES
PRIMARY DIAGNOSIS: GENERALIZED WEAKNESS    OUTPATIENT/OBSERVATION GOALS TO BE MET BEFORE DISCHARGE  1. Orthostatic performed: No    2. Tolerating PO medications: Yes, however pt coughing after med administration/sipping water. Pt has dysphagia. Speech therapy visit suggested.       3. Return to near baseline physical activity: No    4. Cleared for discharge by consultants (if involved): No    Discharge Planner Nurse   Safe discharge environment identified: No  Barriers to discharge: Yes       Entered by: Magui Peters RN 03/27/2023 5:35 AM     Please review provider order for any additional goals.   Nurse to notify provider when observation goals have been met and patient is ready for discharge.    Pt AOx 3; disoriented to situation. Calm and cooperative. Using PrimofFit. Not out of bed during shift. Q2 turn. Denied pain. On IV antibiotic. Room air.  Pt has dysphagia; coughing after medication administration. He could benefit from a speech therapy visit. VSS.

## 2023-03-27 NOTE — PROGRESS NOTES
PRIMARY DIAGNOSIS: GENERALIZED WEAKNESS    OUTPATIENT/OBSERVATION GOALS TO BE MET BEFORE DISCHARGE  1. Orthostatic performed: No    2. Tolerating PO medications: Yes    3. Return to near baseline physical activity: No    4. Cleared for discharge by consultants (if involved): No    Discharge Planner Nurse   Safe discharge environment identified: No  Barriers to discharge: Yes       Entered by: Magui Peters RN 03/26/2023 10:50 PM     Please review provider order for any additional goals.   Nurse to notify provider when observation goals have been met and patient is ready for discharge.    Pt AOx 3; disoriented to situation. Calm and cooperative. Using PrimofFit. Not out of bed during shift. Q2 turn. Denied pain. On IV antibiotic. Room air. VSS.

## 2023-03-27 NOTE — PROGRESS NOTES
PRIMARY DIAGNOSIS: GENERALIZED WEAKNESS    OUTPATIENT/OBSERVATION GOALS TO BE MET BEFORE DISCHARGE  1. Orthostatic performed: No    2. Tolerating PO medications: Yes    3. Return to near baseline physical activity: No    4. Cleared for discharge by consultants (if involved): No    Discharge Planner Nurse   Safe discharge environment identified: No  Barriers to discharge: Yes       Entered by: Magui Peters RN 03/27/2023 3:10 AM     Please review provider order for any additional goals.   Nurse to notify provider when observation goals have been met and patient is ready for discharge.    Pt resting. Had a temperature of 100.4, tylenol administered.

## 2023-03-28 ENCOUNTER — APPOINTMENT (OUTPATIENT)
Dept: RADIOLOGY | Facility: CLINIC | Age: 68
DRG: 178 | End: 2023-03-28
Attending: INTERNAL MEDICINE
Payer: MEDICARE

## 2023-03-28 ENCOUNTER — APPOINTMENT (OUTPATIENT)
Dept: SPEECH THERAPY | Facility: CLINIC | Age: 68
DRG: 178 | End: 2023-03-28
Payer: MEDICARE

## 2023-03-28 PROBLEM — T17.908A ASPIRATION INTO AIRWAY: Status: ACTIVE | Noted: 2023-03-28

## 2023-03-28 PROBLEM — L89.159 PRESSURE INJURY OF SKIN OF SACRAL REGION, UNSPECIFIED INJURY STAGE: Status: ACTIVE | Noted: 2023-03-28

## 2023-03-28 PROBLEM — R62.7 FAILURE TO THRIVE IN ADULT: Status: ACTIVE | Noted: 2023-03-28

## 2023-03-28 PROBLEM — M62.81 GENERALIZED MUSCLE WEAKNESS: Status: ACTIVE | Noted: 2023-03-28

## 2023-03-28 PROBLEM — J85.2: Status: ACTIVE | Noted: 2023-03-28

## 2023-03-28 LAB
BACTERIA BLD CULT: ABNORMAL
BACTERIA BLD CULT: ABNORMAL
BACTERIA BLD CULT: NO GROWTH
MAGNESIUM SERPL-MCNC: 1.8 MG/DL (ref 1.8–2.6)

## 2023-03-28 PROCEDURE — 92611 MOTION FLUOROSCOPY/SWALLOW: CPT | Mod: GN

## 2023-03-28 PROCEDURE — 99207 PR CDG-CUT & PASTE-POTENTIAL IMPACT ON LEVEL: CPT | Performed by: HOSPITALIST

## 2023-03-28 PROCEDURE — 250N000011 HC RX IP 250 OP 636: Performed by: HOSPITALIST

## 2023-03-28 PROCEDURE — 120N000001 HC R&B MED SURG/OB

## 2023-03-28 PROCEDURE — 36415 COLL VENOUS BLD VENIPUNCTURE: CPT | Performed by: INTERNAL MEDICINE

## 2023-03-28 PROCEDURE — 74230 X-RAY XM SWLNG FUNCJ C+: CPT

## 2023-03-28 PROCEDURE — 87522 HEPATITIS C REVRS TRNSCRPJ: CPT | Performed by: HOSPITALIST

## 2023-03-28 PROCEDURE — 250N000011 HC RX IP 250 OP 636: Performed by: INTERNAL MEDICINE

## 2023-03-28 PROCEDURE — 96372 THER/PROPH/DIAG INJ SC/IM: CPT | Performed by: HOSPITALIST

## 2023-03-28 PROCEDURE — 96376 TX/PRO/DX INJ SAME DRUG ADON: CPT

## 2023-03-28 PROCEDURE — 99232 SBSQ HOSP IP/OBS MODERATE 35: CPT | Performed by: HOSPITALIST

## 2023-03-28 PROCEDURE — 250N000013 HC RX MED GY IP 250 OP 250 PS 637: Performed by: HOSPITALIST

## 2023-03-28 PROCEDURE — 92526 ORAL FUNCTION THERAPY: CPT | Mod: GN

## 2023-03-28 PROCEDURE — G0378 HOSPITAL OBSERVATION PER HR: HCPCS

## 2023-03-28 PROCEDURE — 83735 ASSAY OF MAGNESIUM: CPT | Performed by: INTERNAL MEDICINE

## 2023-03-28 PROCEDURE — 250N000013 HC RX MED GY IP 250 OP 250 PS 637: Performed by: INTERNAL MEDICINE

## 2023-03-28 RX ORDER — ENOXAPARIN SODIUM 100 MG/ML
40 INJECTION SUBCUTANEOUS DAILY
Status: DISCONTINUED | OUTPATIENT
Start: 2023-03-28 | End: 2023-04-15 | Stop reason: HOSPADM

## 2023-03-28 RX ORDER — BARIUM SULFATE 400 MG/ML
SUSPENSION ORAL ONCE
Status: COMPLETED | OUTPATIENT
Start: 2023-03-28 | End: 2023-03-28

## 2023-03-28 RX ADMIN — PIPERACILLIN AND TAZOBACTAM 3.38 G: 3; .375 INJECTION, POWDER, LYOPHILIZED, FOR SOLUTION INTRAVENOUS at 00:55

## 2023-03-28 RX ADMIN — AMOXICILLIN AND CLAVULANATE POTASSIUM 1 TABLET: 875; 125 TABLET, FILM COATED ORAL at 19:26

## 2023-03-28 RX ADMIN — MIRTAZAPINE 45 MG: 30 TABLET, FILM COATED ORAL at 21:30

## 2023-03-28 RX ADMIN — CHOLECALCIFEROL TAB 10 MCG (400 UNIT) 10 MCG: 10 TAB at 07:57

## 2023-03-28 RX ADMIN — MULTIPLE VITAMINS W/ MINERALS TAB 1 TABLET: TAB at 07:52

## 2023-03-28 RX ADMIN — ASPIRIN 81 MG: 81 TABLET, COATED ORAL at 07:52

## 2023-03-28 RX ADMIN — ATORVASTATIN CALCIUM 10 MG: 10 TABLET, FILM COATED ORAL at 21:30

## 2023-03-28 RX ADMIN — ENOXAPARIN SODIUM 40 MG: 100 INJECTION SUBCUTANEOUS at 08:01

## 2023-03-28 RX ADMIN — BARIUM SULFATE: 400 SUSPENSION ORAL at 09:00

## 2023-03-28 RX ADMIN — DOCUSATE SODIUM 100 MG: 100 CAPSULE, LIQUID FILLED ORAL at 07:52

## 2023-03-28 RX ADMIN — Medication 100 MG: at 07:52

## 2023-03-28 RX ADMIN — DOCUSATE SODIUM 100 MG: 100 CAPSULE, LIQUID FILLED ORAL at 19:26

## 2023-03-28 RX ADMIN — PIPERACILLIN AND TAZOBACTAM 3.38 G: 3; .375 INJECTION, POWDER, LYOPHILIZED, FOR SOLUTION INTRAVENOUS at 09:28

## 2023-03-28 ASSESSMENT — ACTIVITIES OF DAILY LIVING (ADL)
ADLS_ACUITY_SCORE: 48

## 2023-03-28 NOTE — PROGRESS NOTES
Care Management Follow Up    Length of Stay (days): 0    Expected Discharge Date: 03/30/2023     Concerns to be Addressed: discharge planning       Patient plan of care discussed at interdisciplinary rounds: Yes    Anticipated Discharge Disposition:  long term care TBD  Disposition Comments: TBD    Anticipated Discharge Services: Transportation Services    Anticipated Discharge DME:  TBD      Education Provided on the Discharge Plan:  AVS per bedside RN    Patient/Family in Agreement with the Plan: yes    Referrals Placed by CM/SW:  CM contacted [facility] and post acute care        Additional Information:    [facility] care [facility]. 10:33 AM   CM [facility]. CM sent it to [facility] to be validated. 12:24 PM     PT recommendations: Long term care facility  OT recommendations:Transitional Care Facility   Speech recommendations: Long term care facility     Adult Protection with [facility] handled by Gerry Salinas (phone # [phone])    Zena Merlos RN

## 2023-03-28 NOTE — PROGRESS NOTES
PRIMARY DIAGNOSIS: GENERALIZED WEAKNESS    OUTPATIENT/OBSERVATION GOALS TO BE MET BEFORE DISCHARGE  1. Orthostatic performed: N/A    2. Tolerating PO medications: Yes, whole in applesauce.    3. Return to near baseline physical activity: Yes    4. Cleared for discharge by consultants (if involved): No    Discharge Planner Nurse   Safe discharge environment identified: No  Barriers to discharge: Yes, placement needed.       Entered by: Staci Simons RN 03/27/2023 11:55 PM     Please review provider order for any additional goals.   Nurse to notify provider when observation goals have been met and patient is ready for discharge.

## 2023-03-28 NOTE — PROGRESS NOTES
Has Adult Protection case open with [facility] handled by Gerry Salinas ([phone]). CM called to update Gerry Salinas but was unsuccessful in reaching him. CM left  to return call. 1:37 PM

## 2023-03-28 NOTE — PLAN OF CARE
Problem: Plan of Care - These are the overarching goals to be used throughout the patient stay.    Goal: Plan of Care Review  Description: The Plan of Care Review/Shift note should be completed every shift.  The Outcome Evaluation is a brief statement about your assessment that the patient is improving, declining, or no change.  This information will be displayed automatically on your shift note.  Outcome: Progressing     Problem: Plan of Care - These are the overarching goals to be used throughout the patient stay.    Goal: Optimal Comfort and Wellbeing  Outcome: Progressing   Goal Outcome Evaluation:       Patient is alert, mostly non verbal the entire shift today, will only nod head and answer to yes or no. Had a video swallow study done today, no aspiration,  recommendation in moderately thick liquids, no added ice and no straws, easy to chew textures due to impaired swallow function. VSS.

## 2023-03-28 NOTE — PLAN OF CARE
PRIMARY DIAGNOSIS: GENERALIZED WEAKNESS, FAILURE TO THRIVE,   OUTPATIENT/OBSERVATION GOALS TO BE MET BEFORE DISCHARGE:  ADLs back to baseline: No    Activity and level of assistance: Assist times two pivot to commode.     Pain status: Pain free.    Return to near baseline physical activity: No     Discharge Planner Nurse   Safe discharge environment identified: Yes  Barriers to discharge: Yes       Entered by: Chiquis Burnett RN 03/28/2023 1:31 AM     Patient alert and orientated times three. Disorientated to situation. He denies pain at this time. IV Zosyn infusing. Saline locked between administrations. Turn and reposition q2h. External male catheter in place and patent. Bed alarm in place for patient's safety. Awaiting placement.

## 2023-03-28 NOTE — PLAN OF CARE
Problem: Plan of Care - These are the overarching goals to be used throughout the patient stay.    Goal: Optimal Comfort and Wellbeing  Outcome: Progressing   Goal Outcome Evaluation:    Pills given whole in applesauce. Pt able to make most needs known. [place]. Primofit in place. Flat affect.

## 2023-03-28 NOTE — PROGRESS NOTES
PRIMARY DIAGNOSIS: GENERALIZED WEAKNESS    OUTPATIENT/OBSERVATION GOALS TO BE MET BEFORE DISCHARGE  1. Orthostatic performed: N/A    2. Tolerating PO medications: Yes    3. Return to near baseline physical activity: No    4. Cleared for discharge by consultants (if involved): No    Discharge Planner Nurse   Safe discharge environment identified: Yes  Barriers to discharge: Yes        Entered by: Nupur Salomon RN 03/28/2023 10:08 AM     Please review provider order for any additional goals.   Nurse to notify provider when observation goals have been met and patient is ready for discharge.

## 2023-03-28 NOTE — PROGRESS NOTES
"Videofluoroscopic Swallow Study with Speech Pathology     03/28/23 0860   Appointment Info   Signing Clinician's Name / Credentials (SLP) Charlotte Stoddard MA, CCC-SLp   General Information   Onset of Illness/Injury or Date of Surgery 03/20/23   Referring Physician Claude Johnson, DO   Pertinent History of Current Problem   Per provider documentation, \"68-year-old with history of hepatitis C, cerebrovascular accident, hypertension and former tobacco abuse, admitted on 3/21/2023 by [facility] officials as a vulnerable adult in need of placement.  Incidentally found patient to have leukocytosis, multiple skin ulcers, hepatitis C antibody positive, elevated liver enzymes and hypomagnesemia.  Chest CT revealed new right lower lobe mass measuring 3.3 x 2.9 in the right middle lobe.  CT abdomen pelvis on 3/24 again noted mass in appearance was concerning for pulmonary abscess.  Blood cultures from 3/23 growing gram-positive cocci, verigene coag negative Staphlococcus lugdensis likely contaminant.  Pulmonology recommending 2 weeks of oral antibiotics with Augmentin and outpatient follow up in pulmonology clinic.  Awaiting LTC/TCU placement referrals.\"     General Observations Patient alert. Nonverbal throughout session. He provided inconsistent responses to Y/N questions via head nod/shake.   Type of Evaluation   Type of Evaluation Swallow Evaluation   General Swallowing Observations   Past History of Dysphagia None per cursory review of previous records, however, records in Care Everywhere appear somewhat limited.   Comment, General Swallowing Observations   Patient presented with s/sx of aspiration with thin liquid during clinical swallow evaluation at bedside on 3/27/23. An instrumental evaluation was recommended to further assess swallow physiology & function and determine safest and least restrictive diet.     Respiratory Support (General Swallowing Observations) none   Current Diet/Method of Nutritional " Intake (General Swallowing Observations, NIS) easy to chew (level 7);mildly thick liquids (level 2)   Swallowing Evaluation Videofluoroscopic swallow study (VFSS)   VFSS Evaluation   Radiologist Dr. Eastman   Views Taken left lateral  (A/P view was not completed due to positioning limitations with swallow study chair.)   Physical Location of Procedure [facility]   VFSS Textures Trialed thin liquids;mildly thick liquids;moderately thick liquids/liquidized;pureed   VFSS Eval: Thin Liquid Texture Trial   Mode of Presentation, Thin Liquid cup;self-fed   Order of Presentation Trials 1, 2   Preparatory Phase poor bolus control   Oral Phase, Thin Liquid premature pharyngeal entry   Bolus Location When Swallow Triggered pyriforms   Pharyngeal Phase, Thin Liquid impaired hyolaryngeal excursion   Rosenbek's Penetration Aspiration Scale: Thin Liquid Trial Results 8 - contrast passes glottis, visible subglottic residue remains, absent patient response (aspiration)   Response to Aspiration absent response   Diagnostic Statement   Reduced bolus control resulting in premature spillage beyond the base of the tongue and to the valleculae. Swallow response was delayed, resulting in pourover to the pyriform sinuses. Aspiration occurred from residue in the pyriform sinuses. Patient had no spontaneous cough response with aspiration. He was able to follow verbal cues to complete a volitional cough. Cough cleared some aspirated material from the trachea, but some remained in the laryngeal vestibule.     VFSS Eval: Mildly Thick Liquids   Mode of Presentation cup;self-fed   Order of Presentation Trials 3, 4, 5, 9, 10, 11   Preparatory Phase poor bolus control   Oral Phase premature pharyngeal entry;residue in oral cavity  (Mild oral stasis)   Bolus Location When Swallow Triggered pyriforms   Pharyngeal Phase impaired hyolaryngel excursion   Rosenbek's Penetration Aspiration Scale 8 - contrast passes glottis,  visible subglottic residue remains, absent patient response (aspiration)   Response to Aspiration absent response  (Inconsistent delayed reflexive cough)   Diagnostic Statement   Reduced bolus control resulting in premature spillage beyond the base of the tongue and to the valleculae. Swallow response was delayed, resulting in pourover to the pyriform sinuses. Aspiration occurred during the swallow. Patient had and inconsistent delayed cough response with aspiration. Unclear whether this was effective at clearing aspirated material from the trachea.     VFSS Eval: Moderately Thick Liquids   Mode of Presentation cup;self-fed   Order of Presentation Trials 12, 13, 14   Preparatory Phase poor bolus control   Oral Phase premature pharyngeal entry   Bolus Location When Swallow Triggered pyriforms;posterior laryngeal surface of epiglottis   Pharyngeal Phase impaired hyolaryngel excursion;residue in vallecula  (Minimal vallecular stasis noted inconsistently)   Rosenbek's Penetration Aspiration Scale 2 - contrast enters airway, remains above the vocal cords, no residue remains (penetration)   Diagnostic Statement   Reduced bolus control resulting in premature spillage beyond the base of the tongue and to the valleculae. Swallow response was again delayed resulting in pourover to the pyriform sinuses. There was a single episode of minimal transient laryngeal penetration with moderately thick liquid. No aspiration was observed. Trace vallecular stasis was noted x1.     VFSS Evaluation: Puree Solid Texture Trial   Mode of Presentation, Puree spoon;fed by clinician   Order of Presentation Trials 6, 7, 8   Preparatory Phase poor bolus control   Oral Phase, Puree premature pharyngeal entry   Bolus Location When Swallow Triggered valleculae;posterior laryngeal surface of epiglottis   Pharyngeal Phase, Puree impaired hyolaryngel excursion   Rosenbek's Penetration Aspiration Scale: Puree Food Trial Results 1 - no aspiration,  contrast does not enter airway   Diagnostic Statement   Reduced bolus control resulting in premature spillage beyond the base of the tongue and to the valleculae. Swallow response was again delayed, triggering at either the valleculae or the posterior laryngeal surface of the epiglottis. No aspiration or laryngeal penetration observed. No oropharyngeal stasis noted after swallows.     Esophageal Phase of Swallow   Patient reports or presents with symptoms of esophageal dysphagia No   Swallowing Recommendations   Diet Consistency Recommendations easy to chew (level 7);moderately thick liquids/liquidized (level 3)   Supervision Level for Intake close supervision needed   Mode of Delivery Recommendations bolus size, small;slow rate of intake;no straws   Monitoring/Assistance Required (Eating/Swallowing) stop eating activities when fatigue is present;monitor for cough or change in vocal quality with intake   Recommended Feeding/Eating Techniques (Swallow Eval) maintain upright sitting position for eating;minimize distractions during oral intake;set-up and prepare tray   Medication Administration Recommendations, Swallowing (SLP) Recommend that pills be given one at a time (whole) mixed in applesauce or pudding.   General Therapy Interventions   Planned Therapy Interventions Dysphagia Treatment   Dysphagia treatment Modified diet education;Instruction of safe swallow strategies   Clinical Impression   Criteria for Skilled Therapeutic Interventions Met (SLP Eval) Yes, treatment indicated   SLP Diagnosis Oropharyngeal dysphagia   Risks & Benefits of therapy have been explained evaluation/treatment results reviewed;care plan/treatment goals reviewed;risks/benefits reviewed;current/potential barriers reviewed;participants voiced agreement with care plan;participants included;patient  (Question patient's true understanding/comprehension given cognitive impairment.)   Clinical Impression Comments   Videofluoroscopic swallow  study completed. Patient presents with mild-moderate oropharyngeal dysphagia. Aspiration occurred with thin and mildly thick liquid. Patient had an inconsistent reflexive cough response with aspiration (i.e., aspiration was silent at times). Oral phase of swallow was characterized by adequate bolus prep and posterior bolus transit. Bolus control was reduced with all consistencies, resulting in premature spillage beyond the base of the tongue and to the valleculae. Tongue base retraction was WNL. Pharyngeal phase was characterized by a delayed swallow response across all consistecies. This resulted in pourover to the pyriform sinuses with thin, mildly thick, and moderately thick liquid. With boluses of puree, the swallow response triggered at the valleculae or the posterior laryngeal surface of the epiglottis. Epiglottic inversion was complete. Hyolaryngeal excursion and hyolaryngeal elevation were both reduced. Pharyngeal constriction was adequate. With all consistencies, contrast material readily passed through the pharyngoesophageal segment.     Patient is at high risk for aspiration with thin and mildly thick liquids. Risk appears reduced, though not entirely eliminated, with moderately thick liquids. At this time, recommend downgrade liquids to moderately thick consistency (Level 3). Continue Easy to Chew textures (Level 7) as tolerated. Speech Therapy will follow to monitor diet tolerance and reinforce safe swallowing strategies.     SLP Total Evaluation Time   Evaluation, videofluoroscopic eval of swallow function Minutes (98244) 10   Swallowing Dysfunction &/or Oral Function for Feeding   Treatment of Swallowing Dysfunction &/or Oral Function for Feeding Minutes (61338) 8   Symptoms Noted During/After Treatment None   SLP Discharge Planning   SLP Plan Follow up re: diet tolerance.   SLP Discharge Recommendation Long term care facility   SLP Rationale for DC Rec Patient's swallow function is impaired but may  be at/near new baseline. Cognition will likely limit pt's ability to independently care for himself.   SLP Brief overview of current status    Recommend diet of Easy to Chew textures and moderately thick liquids. Pt to sit fully upright, eat slowly, and take small bites/sips. Do not add ice to thickened liquids, as this will thin them down and increase aspiration risk. Give pills one at a time, mixed in applesauce or pudding.

## 2023-03-28 NOTE — PROGRESS NOTES
[facility]    Medicine Progress Note - Hospitalist Service    Date of Admission:  3/20/2023    Assessment & Plan   Matheus Nieves is a 68 year old male admitted for placement, found to have a possible pulmonary abscess.  Currently clinically stable.  Medically ready for discharge    # Possible pulmonary abscess vs malignancy  - pip/tazo, transition to augmentin to complete 14 days abx (3/25-4/8)  - outpatient pulmonology followup, repeat CT chest in 6 weeks    # Aspiration  - modified diet per speech therapy    # Hepatitis C  - send viral load    # Decubitus ulcers  - wound care           Gallagher Catheter: Not present  Lines: None     Cardiac Monitoring: None  Code Status: No CPR- Do NOT Intubate      Clinically Significant Risk Factors Present on Admission              # Hypoalbuminemia: Lowest albumin = 2.7 g/dL at 3/22/2023  7:41 AM, will monitor as appropriate                  Disposition Plan      Expected Discharge Date: 03/30/2023        Discharge Comments: referals out for TCU/LTC          Demetris Machado MD  Hospitalist Service  [facility]  Securely message with [employer] (more info)  Text page via [employer] Paging/Directory   ______________________________________________________________________    Interval History   Denies chest pain or SOB.      Physical Exam   Vital Signs: Temp: 98.6  F (37  C) Temp src: Oral BP: 112/71 Pulse: 70   Resp: 18 SpO2: 95 % O2 Device: None (Room air)    Weight: 154 lbs 5.15 oz    Gen: lying comfortably in bed, nad  Neuro: alert, responds to questions, does not respond verbally  CV: nl rate, regular rhythm  Pulm:  No acute resp distress, ctab anteriorly  GI:  abdomen soft, nttp    Medical Decision Making             Data

## 2023-03-28 NOTE — UTILIZATION REVIEW
Admission Status; Secondary Review Determination   Under the authority of the Utilization Management Committee, the utilization review process indicated a secondary review on Matheus Nieves. The review outcome is based on review of the medical records, discussions with staff, and applying clinical experience noted on the date of the review.   (x) Inpatient Status Appropriate - This patient's medical care is consistent with medical management for inpatient care and reasonable inpatient medical practice.     RATIONALE FOR DETERMINATION   68-year-old male with multiple medical issues including status as a vulnerable adult in need of placement admitted with a pulmonary mass secondary to pulmonary abscess.  Also has bacteremia which is likely a contaminant.  Continues on IV antibiotics, was seen by pulmonology.  Also has dysphagia with aspiration risk and a swallow study ordered and speech pathology consulted for dietary modifications.  Has multiple decubitus ulcers for which she is receiving wound care.    At the time of admission with the information available to the attending physician more than 2 nights Hospital complex care was anticipated, based on patient risk of adverse outcome if treated as outpatient and complex care required. Inpatient admission is appropriate based on the Medicare guidelines.   The information on this document is developed by the utilization review team in order for the business office to ensure compliance. This only denotes the appropriateness of proper admission status and does not reflect the quality of care rendered.   The definitions of Inpatient Status and Observation Status used in making the determination above are those provided in the CMS Coverage Manual, Chapter 1 and Chapter 6, section 70.4.   Sincerely,   Leo Mendiola MD  Utilization Review  Physician Advisor  [facility]

## 2023-03-28 NOTE — PROGRESS NOTES
PRIMARY DIAGNOSIS: GENERALIZED WEAKNESS    OUTPATIENT/OBSERVATION GOALS TO BE MET BEFORE DISCHARGE  1. Orthostatic performed: N/A    2. Tolerating PO medications: Yes, whole in applesauce.    3. Return to near baseline physical activity: Yes    4. Cleared for discharge by consultants (if involved): No    Discharge Planner Nurse   Safe discharge environment identified: No  Barriers to discharge: Yes, placement needed.       Entered by: Staci Simons RN 03/27/2023 11:52 PM     Please review provider order for any additional goals.   Nurse to notify provider when observation goals have been met and patient is ready for discharge.

## 2023-03-28 NOTE — PLAN OF CARE
PRIMARY DIAGNOSIS: GENERALIZED WEAKNESS, FAILURE TO THRIVE   OUTPATIENT/OBSERVATION GOALS TO BE MET BEFORE DISCHARGE:  ADLs back to baseline: No    Activity and level of assistance: Assist times 2 pivot belt walker    Pain status: Pain free.    Return to near baseline physical activity: No     Discharge Planner Nurse   Safe discharge environment identified: Yes  Barriers to discharge: Yes       Entered by: Chiquis Burnett RN 03/28/2023 4:37 AM     Patient resting in bed with eyes closed, IV Zosyn infusing at this time. Bed alarm in place for patient's safety.

## 2023-03-29 ENCOUNTER — DOCUMENTATION ONLY (OUTPATIENT)
Dept: OTHER | Facility: CLINIC | Age: 68
End: 2023-03-29
Payer: MEDICARE

## 2023-03-29 ENCOUNTER — APPOINTMENT (OUTPATIENT)
Dept: OCCUPATIONAL THERAPY | Facility: CLINIC | Age: 68
DRG: 178 | End: 2023-03-29
Payer: MEDICARE

## 2023-03-29 LAB
HOLD SPECIMEN: NORMAL
MAGNESIUM SERPL-MCNC: 1.8 MG/DL (ref 1.8–2.6)

## 2023-03-29 PROCEDURE — 36415 COLL VENOUS BLD VENIPUNCTURE: CPT | Performed by: HOSPITALIST

## 2023-03-29 PROCEDURE — 120N000001 HC R&B MED SURG/OB

## 2023-03-29 PROCEDURE — 99232 SBSQ HOSP IP/OBS MODERATE 35: CPT | Performed by: FAMILY MEDICINE

## 2023-03-29 PROCEDURE — 83735 ASSAY OF MAGNESIUM: CPT | Performed by: HOSPITALIST

## 2023-03-29 PROCEDURE — 97535 SELF CARE MNGMENT TRAINING: CPT | Mod: GO

## 2023-03-29 PROCEDURE — 250N000013 HC RX MED GY IP 250 OP 250 PS 637: Performed by: HOSPITALIST

## 2023-03-29 PROCEDURE — 250N000013 HC RX MED GY IP 250 OP 250 PS 637: Performed by: INTERNAL MEDICINE

## 2023-03-29 PROCEDURE — 250N000011 HC RX IP 250 OP 636: Performed by: HOSPITALIST

## 2023-03-29 RX ADMIN — ASPIRIN 81 MG: 81 TABLET, COATED ORAL at 08:57

## 2023-03-29 RX ADMIN — ATORVASTATIN CALCIUM 10 MG: 10 TABLET, FILM COATED ORAL at 21:18

## 2023-03-29 RX ADMIN — CHOLECALCIFEROL TAB 10 MCG (400 UNIT) 10 MCG: 10 TAB at 09:00

## 2023-03-29 RX ADMIN — ENOXAPARIN SODIUM 40 MG: 100 INJECTION SUBCUTANEOUS at 08:58

## 2023-03-29 RX ADMIN — MULTIPLE VITAMINS W/ MINERALS TAB 1 TABLET: TAB at 08:58

## 2023-03-29 RX ADMIN — MIRTAZAPINE 45 MG: 30 TABLET, FILM COATED ORAL at 21:18

## 2023-03-29 RX ADMIN — Medication 100 MG: at 08:57

## 2023-03-29 RX ADMIN — DOCUSATE SODIUM 100 MG: 100 CAPSULE, LIQUID FILLED ORAL at 19:36

## 2023-03-29 RX ADMIN — AMOXICILLIN AND CLAVULANATE POTASSIUM 1 TABLET: 875; 125 TABLET, FILM COATED ORAL at 08:58

## 2023-03-29 RX ADMIN — AMOXICILLIN AND CLAVULANATE POTASSIUM 1 TABLET: 875; 125 TABLET, FILM COATED ORAL at 19:36

## 2023-03-29 RX ADMIN — DOCUSATE SODIUM 100 MG: 100 CAPSULE, LIQUID FILLED ORAL at 08:57

## 2023-03-29 ASSESSMENT — ACTIVITIES OF DAILY LIVING (ADL)
ADLS_ACUITY_SCORE: 52
WEAR_GLASSES_OR_BLIND: NO
THE_FOLLOWING_AIDS_WERE_PROVIDED;: PATIENT DECLINED OFFER OF AUXILIARY AIDS
DRESS: 1-->ASSISTANCE (EQUIPMENT/PERSON) NEEDED
TOILETING_ISSUES: YES
CONCENTRATING,_REMEMBERING_OR_MAKING_DECISIONS_DIFFICULTY: YES
WERE_AUXILIARY_AIDS_OFFERED?: NO
TOILETING: 1-->ASSISTANCE (EQUIPMENT/PERSON) NEEDED (NOT DEVELOPMENTALLY APPROPRIATE)
ADLS_ACUITY_SCORE: 44
DRESS: 1-->ASSISTANCE (EQUIPMENT/PERSON) NEEDED (NOT DEVELOPMENTALLY APPROPRIATE)
CHANGE_IN_FUNCTIONAL_STATUS_SINCE_ONSET_OF_CURRENT_ILLNESS/INJURY: NO
BATHING: 1-->ASSISTANCE NEEDED
ADLS_ACUITY_SCORE: 48
TOILETING_ASSISTANCE: TOILETING DIFFICULTY, ASSISTANCE 1 PERSON
DOING_ERRANDS_INDEPENDENTLY_DIFFICULTY: YES
DRESSING/BATHING: BATHING DIFFICULTY, ASSISTANCE 1 PERSON;DRESSING DIFFICULTY, ASSISTANCE 1 PERSON
DIFFICULTY_EATING/SWALLOWING: NO
FALL_HISTORY_WITHIN_LAST_SIX_MONTHS: YES
ADLS_ACUITY_SCORE: 48
WALKING_OR_CLIMBING_STAIRS_DIFFICULTY: YES
ADLS_ACUITY_SCORE: 48
ADLS_ACUITY_SCORE: 52
ADLS_ACUITY_SCORE: 48
PATIENT'S_PREFERRED_MEANS_OF_COMMUNICATION: VERBAL
TOILETING: 1-->ASSISTANCE (EQUIPMENT/PERSON) NEEDED
WALKING_OR_CLIMBING_STAIRS: AMBULATION DIFFICULTY, ASSISTANCE 1 PERSON;STAIR CLIMBING DIFFICULTY, DEPENDENT;TRANSFERRING DIFFICULTY, ASSISTANCE 1 PERSON
NUMBER_OF_TIMES_PATIENT_HAS_FALLEN_WITHIN_LAST_SIX_MONTHS: 1
ADLS_ACUITY_SCORE: 52
ADLS_ACUITY_SCORE: 52
TRANSFERRING: 1-->ASSISTANCE (EQUIPMENT/PERSON) NEEDED
TRANSFERRING: 1-->ASSISTANCE (EQUIPMENT/PERSON) NEEDED (NOT DEVELOPMENTALLY APPROPRIATE)
EQUIPMENT_CURRENTLY_USED_AT_HOME: WHEELCHAIR, MANUAL;WALKER, ROLLING
DESCRIBE_HEARING_LOSS: BILATERAL HEARING LOSS
HEARING_DIFFICULTY_OR_DEAF: YES
ADLS_ACUITY_SCORE: 52
DIFFICULTY_COMMUNICATING: NO
ADLS_ACUITY_SCORE: 52
DRESSING/BATHING_DIFFICULTY: YES
ADLS_ACUITY_SCORE: 52

## 2023-03-29 NOTE — PROGRESS NOTES
Care Management Follow Up    Length of Stay (days): 1    Expected Discharge Date: 03/31/2023     Concerns to be Addressed: discharge planning       Patient plan of care discussed at interdisciplinary rounds: Yes    Anticipated Discharge Disposition:  long term care TBD  Disposition Comments: TBD    Anticipated Discharge Services: Transportation Services    Anticipated Discharge DME:  TBD    Education Provided on the Discharge Plan:  AVS per bedside RN    Patient/Family in Agreement with the Plan: yes    Referrals Placed by CM/SW:  (CM contacted [facility] and long term care        Additional Information:  CM reviewed chart. CM following for discharge needs. Long term care facilities. Transportation TBD. CM spoke to  [facility]  ([phone]).   CM spoke to [facility] hospital transfer staff. [facility] was given information for transfer and was given contact information for CM. CM will follow.     Long term care bed request  [facility] ([facility])  [facility] ()  [place])  [place] ()  [place] [facility])  [facility] ([facility])  [facility]  [facility]  [facility] (TCU)  [facility] ([facility])  [facility]  [facility]  [facility] ([facility])  [facility] (AFTER HOURS) ([facility])  [facility] (SNF)  [facility] (SNF)  [facility] ([facility]NF[facility]  [facility]  [facility] ([facility])  [facility] ([facility])  [place] [facility] ([facility])  [facility] ([facility])  [place] ([facility])  [facility] ([facility])    Zena Merlos RN

## 2023-03-29 NOTE — PLAN OF CARE
Problem: Skin Injury Risk Increased  Goal: Skin Health and Integrity  Outcome: Progressing   Patient offered turns and reposition every two hours and as needed. Patient compliment at times, but also refuses at times. Low air loss mattress in place. Sacrum/Coccyx is pink, blanchable, skin intact.  Problem: Swallowing Impairment  Goal: Optimal Eating and Swallowing Without Aspiration  Outcome: Progressing   Patient followed by Speech therapy. Per Speeches recommendation, patient should be on moderately thickened liquids. Patient refusing moderately thickened liquids, requesting thin liquids. Writer and Dr. Corona educated patient on reason for thickened liquids, risks of thin liquids. Patient stated understanding and chose thin liquids despite the risks.    Sharonda Clinton RN  3/29/2023

## 2023-03-29 NOTE — PROGRESS NOTES
[facility]    Medicine Progress Note - Hospitalist Service    Date of Admission:  3/20/2023    Assessment & Plan   Matheus Nieves is a 68 year old male admitted for placement, found to have a possible pulmonary abscess.  Currently clinically stable.  Medically ready for discharge awaiting placement.    # Possible pulmonary abscess vs malignancy  - pip/tazo, transition to augmentin to complete 14 days abx (3/25-4/8)  - outpatient pulmonology followup, repeat CT chest in 6 weeks  - he has made multiple comments about not wanting curative care.  His symptoms are stable and diagnosis unclear so this isn't urgent, but would consider outpatient palliative/hospice eval    # Aspiration  - modified diet per speech therapy  - patient is refusing thickened liquids.  I discussed the risk of not following the modified diet.  He states he understnads, he does not care and wants regular liquids.    # Hepatitis C  - send viral load    # Decubitus ulcers  - wound care           Gallagher Catheter: Not present  Lines: None     Cardiac Monitoring: None  Code Status: No CPR- Do NOT Intubate      Clinically Significant Risk Factors              # Hypoalbuminemia: Lowest albumin = 2.7 g/dL at 3/22/2023  7:41 AM, will monitor as appropriate                    Disposition Plan      Expected Discharge Date: 03/31/2023        Discharge Comments: referals out for TCU/LTC          Ciaran Corona MD  Hospitalist Service  [facility]  Securely message with [employer] (more info)  Text page via [employer] Paging/Directory   ______________________________________________________________________    Interval History   Denies chest pain or SOB.      Physical Exam   Vital Signs: Temp: 97.7  F (36.5  C) Temp src: Oral BP: 134/84 Pulse: 87   Resp: 18 SpO2: 97 % O2 Device: None (Room air)    Weight: 154 lbs 5.15 oz    Gen: lying comfortably in bed, nad, disheveled  Neuro: alert, responds to questions, does  not respond verbally  CV: nl rate, regular rhythm  Pulm:  No acute resp distress, ctab anteriorly  GI:  abdomen soft, nttp    Medical Decision Making

## 2023-03-29 NOTE — PROGRESS NOTES
Patient refuses thickened liquids.  Understands risk of this decision include pneumonia, respiratory failure.  Will change order.

## 2023-03-29 NOTE — PLAN OF CARE
Problem: Skin Injury Risk Increased  Goal: Skin Health and Integrity  Outcome: Progressing    Problem: Swallowing Impairment  Goal: Optimal Eating and Swallowing Without Aspiration  Outcome: Progressing   Pt very flat, withdrawn throughout shift. Responding by nodding and shaking his head. Moderately thick diet. Taking medications as prescribed. Male purewick in place. Q2 turns.

## 2023-03-30 LAB
HCV RNA SERPL NAA+PROBE-ACNC: ABNORMAL IU/ML
HCV RNA SERPL NAA+PROBE-LOG IU: 5.5 {LOG_IU}/ML
HOLD SPECIMEN: NORMAL
LACTATE SERPL-SCNC: 0.7 MMOL/L (ref 0.7–2)

## 2023-03-30 PROCEDURE — 250N000013 HC RX MED GY IP 250 OP 250 PS 637: Performed by: INTERNAL MEDICINE

## 2023-03-30 PROCEDURE — 83605 ASSAY OF LACTIC ACID: CPT | Performed by: HOSPITALIST

## 2023-03-30 PROCEDURE — 250N000013 HC RX MED GY IP 250 OP 250 PS 637: Performed by: HOSPITALIST

## 2023-03-30 PROCEDURE — 250N000011 HC RX IP 250 OP 636: Performed by: HOSPITALIST

## 2023-03-30 PROCEDURE — 120N000001 HC R&B MED SURG/OB

## 2023-03-30 PROCEDURE — 99232 SBSQ HOSP IP/OBS MODERATE 35: CPT | Performed by: HOSPITALIST

## 2023-03-30 PROCEDURE — 99207 PR CDG-CUT & PASTE-POTENTIAL IMPACT ON LEVEL: CPT | Performed by: HOSPITALIST

## 2023-03-30 PROCEDURE — 36415 COLL VENOUS BLD VENIPUNCTURE: CPT | Performed by: HOSPITALIST

## 2023-03-30 RX ORDER — DOCUSATE SODIUM 100 MG/1
100 CAPSULE, LIQUID FILLED ORAL 2 TIMES DAILY
Start: 2023-03-30 | End: 2023-04-15

## 2023-03-30 RX ADMIN — ATORVASTATIN CALCIUM 10 MG: 10 TABLET, FILM COATED ORAL at 20:58

## 2023-03-30 RX ADMIN — Medication 100 MG: at 08:12

## 2023-03-30 RX ADMIN — MULTIPLE VITAMINS W/ MINERALS TAB 1 TABLET: TAB at 08:12

## 2023-03-30 RX ADMIN — MIRTAZAPINE 45 MG: 30 TABLET, FILM COATED ORAL at 20:59

## 2023-03-30 RX ADMIN — AMOXICILLIN AND CLAVULANATE POTASSIUM 1 TABLET: 875; 125 TABLET, FILM COATED ORAL at 20:59

## 2023-03-30 RX ADMIN — AMOXICILLIN AND CLAVULANATE POTASSIUM 1 TABLET: 875; 125 TABLET, FILM COATED ORAL at 08:12

## 2023-03-30 RX ADMIN — DOCUSATE SODIUM 100 MG: 100 CAPSULE, LIQUID FILLED ORAL at 08:12

## 2023-03-30 RX ADMIN — ENOXAPARIN SODIUM 40 MG: 100 INJECTION SUBCUTANEOUS at 08:12

## 2023-03-30 RX ADMIN — ASPIRIN 81 MG: 81 TABLET, COATED ORAL at 08:12

## 2023-03-30 RX ADMIN — CHOLECALCIFEROL TAB 10 MCG (400 UNIT) 10 MCG: 10 TAB at 08:15

## 2023-03-30 ASSESSMENT — ACTIVITIES OF DAILY LIVING (ADL)
ADLS_ACUITY_SCORE: 50
ADLS_ACUITY_SCORE: 52
ADLS_ACUITY_SCORE: 56
ADLS_ACUITY_SCORE: 49
ADLS_ACUITY_SCORE: 52
ADLS_ACUITY_SCORE: 56
ADLS_ACUITY_SCORE: 49
ADLS_ACUITY_SCORE: 50
ADLS_ACUITY_SCORE: 52
ADLS_ACUITY_SCORE: 52

## 2023-03-30 NOTE — PLAN OF CARE
Problem: Skin Injury Risk Increased  Goal: Skin Health and Integrity  Outcome: Progressing   Pt very flat and withdrawn. Tolerating Q2 turns. Refused moderately thickened liquids, back to thin liquids. Taking medications as prescribed. Awaiting placement.

## 2023-03-30 NOTE — PLAN OF CARE
Q2 turns. Denying pain. Purewick in place. VSS.   Problem: Swallowing Impairment  Goal: Optimal Eating and Swallowing Without Aspiration  3/30/2023 0750 by Radha Chadwick RN  Outcome: Progressing

## 2023-03-30 NOTE — PLAN OF CARE
Problem: Plan of Care - These are the overarching goals to be used throughout the patient stay.    Goal: Optimal Comfort and Wellbeing  Intervention: Monitor Pain and Promote Comfort  Recent Flowsheet Documentation  Taken 3/30/2023 0815 by Diana Gage RN  Pain Management Interventions: repositioned     Problem: Skin Injury Risk Increased  Goal: Skin Health and Integrity  Outcome: Progressing   Goal Outcome Evaluation:  Pt pleasant and cooperative throughout this shift.  We were able to get him a good shower today.  He had a large soft bowel movement this afternoon, placed a hold on colace before bed and tomorrow morning due to soft BM's. Pt is awaiting placement for LTC.

## 2023-03-30 NOTE — PROGRESS NOTES
Care Management Follow Up    Length of Stay (days): 2    Expected Discharge Date: 03/31/2023     Concerns to be Addressed: discharge planning       Patient plan of care discussed at interdisciplinary rounds: Yes    Anticipated Discharge Disposition: Long Term Care  Disposition Comments: long term care TBD    Anticipated Discharge Services: Transportation Services    Anticipated Discharge DME:  TBD     Education Provided on the Discharge Plan:  AVS per bedside RN    Patient/Family in Agreement with the Plan: yes    Referrals Placed by CM/SW:  CM contacted [facility] and [facility]        Additional Information:  CM reviewed. Chart. CM met with patient. He is agreeable to long term care. He would like CM to update his wife, Amber is ok to update. 8:23 AM Long term care referrals have been sent.Transportation will likely need to be arranged by CM. CM will follow.     Referral pending  [place] [facility] [place] ([facility])    Zena Merlos RN

## 2023-03-30 NOTE — PROGRESS NOTES
[facility]    Medicine Progress Note - Hospitalist Service    Date of Admission:  3/20/2023    Assessment & Plan   Matheus Nieves is a 68 year old male admitted for placement, found to have a pulmonary abscess vs malignancy.  Remains clinically stable, awaiting appropriate disposition.    # Possible pulmonary abscess vs malignancy  - pip/tazo, transition to augmentin to complete 14 days abx (3/25-4/8)  - outpatient pulmonology followup, repeat CT chest in 6 weeks     # Aspiration  - modified diet per speech (thin liquids per patient request)    # Hepatitis C  - viral load pending     # Decubitus ulcers  - wound care       Gallagher Catheter: Not present  Lines: None     Cardiac Monitoring: None  Code Status: No CPR- Do NOT Intubate      Clinically Significant Risk Factors              # Hypoalbuminemia: Lowest albumin = 2.7 g/dL at 3/22/2023  7:41 AM, will monitor as appropriate                   Disposition Plan      Expected Discharge Date: 03/31/2023    Discharge Delays: *Medically Ready for Discharge    Discharge Comments: referals out for TCU/LTC          Demetris Machado MD  Hospitalist Service  [facility]  Securely message with [employer] (more info)  Text page via [employer] Paging/Directory   ______________________________________________________________________    Interval History   Denies chest pain/pressure, SOB, or abdominal pain.    Physical Exam   Vital Signs: Temp: 98.2  F (36.8  C) Temp src: Oral BP: 131/84 Pulse: 74   Resp: 20 SpO2: 92 % O2 Device: None (Room air)    Weight: 154 lbs 5.15 oz    Gen: lying comfortably in bed, nad  Neuro:  Alert, attentive, responded verbally to one question  CV: nl rate, regular rhythm  Pulm: no acute resp distress, coarse breath sounds anteriorly improved after cough  GI: abdomen soft, NTTP    Medical Decision Making             Data

## 2023-03-30 NOTE — PROGRESS NOTES
Pt's wife called requesting an update on this pt.  She said she didn't even know why he was admitted.  She was concerned that his speech was more difficult to understand and thought maybe it's because he hasn't had any interaction.  Informed her he had visitors earlier today and that he also had a shower, so he may be sleepy from that.  She [facility] to [facility].  Informed her he is here for LTC placement and she asked if there was any movement on that.  Offered to transfer her to speak with CM, she declined and said she would call Amber (pt's daughter).

## 2023-03-30 NOTE — PROGRESS NOTES
Placed a HOLD on pt's colace tonight and tomorrow morning due to loose/soft stools.  He had a large soft stool, re-evaluate tomorrow afternoon to determine if his stools are still soft.

## 2023-03-31 ENCOUNTER — APPOINTMENT (OUTPATIENT)
Dept: SPEECH THERAPY | Facility: CLINIC | Age: 68
DRG: 178 | End: 2023-03-31
Payer: MEDICARE

## 2023-03-31 PROBLEM — B19.20 HEPATITIS C: Status: ACTIVE | Noted: 2023-03-31

## 2023-03-31 LAB
BACTERIA BLD CULT: NO GROWTH
BACTERIA BLD CULT: NO GROWTH

## 2023-03-31 PROCEDURE — 250N000011 HC RX IP 250 OP 636: Performed by: HOSPITALIST

## 2023-03-31 PROCEDURE — 99232 SBSQ HOSP IP/OBS MODERATE 35: CPT | Performed by: HOSPITALIST

## 2023-03-31 PROCEDURE — 92526 ORAL FUNCTION THERAPY: CPT | Mod: GN | Performed by: SPEECH-LANGUAGE PATHOLOGIST

## 2023-03-31 PROCEDURE — 99207 PR CDG-CUT & PASTE-POTENTIAL IMPACT ON LEVEL: CPT | Performed by: HOSPITALIST

## 2023-03-31 PROCEDURE — 250N000013 HC RX MED GY IP 250 OP 250 PS 637: Performed by: INTERNAL MEDICINE

## 2023-03-31 PROCEDURE — 250N000013 HC RX MED GY IP 250 OP 250 PS 637: Performed by: HOSPITALIST

## 2023-03-31 PROCEDURE — 120N000001 HC R&B MED SURG/OB

## 2023-03-31 RX ADMIN — AMOXICILLIN AND CLAVULANATE POTASSIUM 1 TABLET: 875; 125 TABLET, FILM COATED ORAL at 21:21

## 2023-03-31 RX ADMIN — ENOXAPARIN SODIUM 40 MG: 100 INJECTION SUBCUTANEOUS at 09:39

## 2023-03-31 RX ADMIN — CHOLECALCIFEROL TAB 10 MCG (400 UNIT) 10 MCG: 10 TAB at 09:48

## 2023-03-31 RX ADMIN — ATORVASTATIN CALCIUM 10 MG: 10 TABLET, FILM COATED ORAL at 21:22

## 2023-03-31 RX ADMIN — DOCUSATE SODIUM 100 MG: 100 CAPSULE, LIQUID FILLED ORAL at 21:28

## 2023-03-31 RX ADMIN — AMOXICILLIN AND CLAVULANATE POTASSIUM 1 TABLET: 875; 125 TABLET, FILM COATED ORAL at 09:38

## 2023-03-31 RX ADMIN — ASPIRIN 81 MG: 81 TABLET, COATED ORAL at 09:38

## 2023-03-31 RX ADMIN — MULTIPLE VITAMINS W/ MINERALS TAB 1 TABLET: TAB at 09:39

## 2023-03-31 RX ADMIN — Medication 100 MG: at 09:38

## 2023-03-31 RX ADMIN — MIRTAZAPINE 45 MG: 30 TABLET, FILM COATED ORAL at 21:22

## 2023-03-31 ASSESSMENT — ACTIVITIES OF DAILY LIVING (ADL)
ADLS_ACUITY_SCORE: 56
ADLS_ACUITY_SCORE: 52
ADLS_ACUITY_SCORE: 56
ADLS_ACUITY_SCORE: 54
ADLS_ACUITY_SCORE: 54
ADLS_ACUITY_SCORE: 52
ADLS_ACUITY_SCORE: 54
ADLS_ACUITY_SCORE: 54
ADLS_ACUITY_SCORE: 52
ADLS_ACUITY_SCORE: 56
ADLS_ACUITY_SCORE: 52
ADLS_ACUITY_SCORE: 52

## 2023-03-31 NOTE — PLAN OF CARE
Problem: Plan of Care - These are the overarching goals to be used throughout the patient stay.    Goal: Optimal Comfort and Wellbeing  Outcome: Progressing   Goal Outcome Evaluation:    Pt agreeable to cares with a flat affect. Pills given whole, one at a time, in applesauce. Primofit intact and patent.

## 2023-03-31 NOTE — PROGRESS NOTES
"CLINICAL NUTRITION NOTE    Pt with positive nursing nutrition risk screen with \"unsure\" of weight loss. No change in intake    Pt has been in hospital 11 day. Eating >75% of 3 adequate meal/day    Wt   157.3 lb 3/13  152 - stated in ER 3/20  158 lb 3/31  May have lost a few lb but has gained them back.     Noted pt is refusing recommended by SLP moderately thick liquids post VFSS. High aspiration risk for thin and mild thick liquids. Pt accepting of risks of drinking thin liquids.     No nutrition risk noted and nutrition assesement not indicated. Will sign off and follow peripherally unless consulted  "

## 2023-03-31 NOTE — PLAN OF CARE
Problem: Plan of Care - These are the overarching goals to be used throughout the patient stay.    Goal: Readiness for Transition of Care  Outcome: Progressing   Goal Outcome Evaluation:                      A/O x 3. Baseline garbled speech, flat affect. Very calm and cooperative throughout shift. N0kanhm due to limited mobility, air mattress on. Got up to commode yesterday evening, BM x 1. External catheter utilized. VSS. Discharge pending placement.

## 2023-03-31 NOTE — PROGRESS NOTES
Care Management Follow Up    Length of Stay (days): 3    Expected Discharge Date: 04/01/2023     Concerns to be Addressed: discharge planning       Patient plan of care discussed at interdisciplinary rounds: Yes    Anticipated Discharge Disposition:  long term care - TBD  Disposition Comments: long term care TBD    Anticipated Discharge Services: Transportation Services    Anticipated Discharge DME:  TBD    Patient/family educated on Medicare website which has current facility and service quality ratings: yes     Education Provided on the Discharge Plan:  AVS per bedside RN    Patient/Family in Agreement with the Plan: yes    Referrals Placed by CM/SW:  CM contacted [facility] and long term care referrals        Additional Information:  CM reviewed chart. Referrals pending. [facility] to [facility] up on referral.  Transportation will need to be arranged by CM. CM will follow.   Amber (daughter) working on [employer].     Long term care referral sent to [facility] liaison with [facility]. 7:13 AM     Zena Merlos RN

## 2023-03-31 NOTE — PLAN OF CARE
Pt has been resting in bed throughout shift but is easily rousable and able to make his need known. He has denied being in any pain. Medications are to be given whole in applesauce, one at a time. Drinks are mildly thickened with no straws. Q2 turns but is able to pivot with assist x2 and walker.  Bed in lowest position, call light within reach, bed alarm armed.       Goal Outcome Evaluation:         Problem: Plan of Care - These are the overarching goals to be used throughout the patient stay.    Goal: Absence of Hospital-Acquired Illness or Injury  Outcome: Progressing  Intervention: Identify and Manage Fall Risk  Recent Flowsheet Documentation  Taken 3/31/2023 1626 by Cheryl Leong, RN  Safety Promotion/Fall Prevention:   clutter free environment maintained   bed alarm on   activity supervised  Goal: Optimal Comfort and Wellbeing  Outcome: Progressing     Problem: Swallowing Impairment  Goal: Optimal Eating and Swallowing Without Aspiration  Outcome: Progressing

## 2023-03-31 NOTE — CARE PLAN
"Speech Language Therapy Discharge Summary    Reason for therapy discharge:    Patient/family request discontinuation of services.    Progress towards therapy goal(s). See goals on Care Plan in [facility] electronic health record for goal details.  Goals not met.  Barriers to achieving goals:   limited tolerance for therapy.    Therapy recommendation(s):    Patient declining further SLP services despite education on risks associated with thin liquids intake after completion of VFSS. Skilled SLP services for dysphagia may be appropriate in the future if patient decides he would like to receive services.     VFSS 3/28/23:   \"Videofluoroscopic swallow study completed. Patient presents with mild-moderate oropharyngeal dysphagia. Aspiration occurred with thin and mildly thick liquid. Patient had an inconsistent reflexive cough response with aspiration (i.e., aspiration was silent at times). Oral phase of swallow was characterized by adequate bolus prep and posterior bolus transit. Bolus control was reduced with all consistencies, resulting in premature spillage beyond the base of the tongue and to the valleculae. Tongue base retraction was WNL. Pharyngeal phase was characterized by a delayed swallow response across all consistecies. This resulted in pourover to the pyriform sinuses with thin, mildly thick, and moderately thick liquid. With boluses of puree, the swallow response triggered at the valleculae or the posterior laryngeal surface of the epiglottis. Epiglottic inversion was complete. Hyolaryngeal excursion and hyolaryngeal elevation were both reduced. Pharyngeal constriction was adequate. With all consistencies, contrast material readily passed through the pharyngoesophageal segment.      Patient is at high risk for aspiration with thin and mildly thick liquids. Risk appears reduced, though not entirely eliminated, with moderately thick liquids. At this time, recommend downgrade liquids to moderately thick consistency " "(Level 3). Continue Easy to Chew textures (Level 7) as tolerated. Speech Therapy will follow to monitor diet tolerance and reinforce safe swallowing strategies.\"         "

## 2023-03-31 NOTE — PROGRESS NOTES
[facility]    Medicine Progress Note - Hospitalist Service    Date of Admission:  3/20/2023    Assessment & Plan   Matheus Nieves is a 68 year old male admitted for placement, found to have a pulmonary abscess vs malignancy.  Remains clinically stable, awaiting placement.  Patient already aware of hepatitis C diagnosis, unclear if he had treatment.      # Possible pulmonary abscess vs malignancy  - pip/tazo, transition to augmentin to complete 14 days abx (3/25-4/8)  - outpatient pulmonology followup, repeat CT chest in 6 weeks     # Aspiration  - modified diet per speech (thin liquids per patient request)     # Hepatitis C  - outpatient hepatology referral     # Decubitus ulcers  - wound care         Gallagher Catheter: Not present  Lines: None     Cardiac Monitoring: None  Code Status: No CPR- Do NOT Intubate      Clinically Significant Risk Factors              # Hypoalbuminemia: Lowest albumin = 2.7 g/dL at 3/22/2023  7:41 AM, will monitor as appropriate                   Disposition Plan      Expected Discharge Date: 04/01/2023    Discharge Delays: *Medically Ready for Discharge  Destination: long-term care facility;nursing home  Discharge Comments: referals out for TCU/LTC          Demetris Machado MD  Hospitalist Service  [facility]  Securely message with [employer] (more info)  Text page via [employer] Paging/Directory   ______________________________________________________________________    Interval History   Denies chest pain/pressure, SOB, or abdominal pain.    Physical Exam   Vital Signs: Temp: 97.8  F (36.6  C) Temp src: Oral BP: 112/70 Pulse: 75   Resp: 18 SpO2: 94 % O2 Device: None (Room air)    Weight: 158 lbs 8.17 oz    Gen:  Alert, does not respond verbally  CV: nl rate, regular rhythm  Pulm:  No acute resp distress, ctab anteriorly  GI:  Soft, NTTP    Medical Decision Making             Data

## 2023-03-31 NOTE — PROGRESS NOTES
Occupational Therapy Discharge Summary    Reason for therapy discharge:    Patient/family request discontinuation of services.    Progress towards therapy goal(s). See goals on Care Plan in [facility] electronic health record for goal details.  Goals not met.  Barriers to achieving goals:   limited tolerance for therapy.    Therapy recommendation(s):    Patient's plan is to discharge to Long Term Care where pt will receive assistance with all activities of daily living, pt declining Occupational Therapy sessions at this time.     SLUMs screen was attempted during this stay and pt declined participation.

## 2023-04-01 PROCEDURE — 99231 SBSQ HOSP IP/OBS SF/LOW 25: CPT | Performed by: HOSPITALIST

## 2023-04-01 PROCEDURE — 250N000013 HC RX MED GY IP 250 OP 250 PS 637: Performed by: INTERNAL MEDICINE

## 2023-04-01 PROCEDURE — 120N000001 HC R&B MED SURG/OB

## 2023-04-01 PROCEDURE — 250N000011 HC RX IP 250 OP 636: Performed by: HOSPITALIST

## 2023-04-01 PROCEDURE — 99207 PR CDG-CUT & PASTE-POTENTIAL IMPACT ON LEVEL: CPT | Performed by: HOSPITALIST

## 2023-04-01 PROCEDURE — 250N000013 HC RX MED GY IP 250 OP 250 PS 637: Performed by: HOSPITALIST

## 2023-04-01 RX ADMIN — ATORVASTATIN CALCIUM 10 MG: 10 TABLET, FILM COATED ORAL at 21:34

## 2023-04-01 RX ADMIN — DOCUSATE SODIUM 100 MG: 100 CAPSULE, LIQUID FILLED ORAL at 08:54

## 2023-04-01 RX ADMIN — MIRTAZAPINE 45 MG: 30 TABLET, FILM COATED ORAL at 21:34

## 2023-04-01 RX ADMIN — AMOXICILLIN AND CLAVULANATE POTASSIUM 1 TABLET: 875; 125 TABLET, FILM COATED ORAL at 08:54

## 2023-04-01 RX ADMIN — Medication 100 MG: at 08:54

## 2023-04-01 RX ADMIN — DOCUSATE SODIUM 100 MG: 100 CAPSULE, LIQUID FILLED ORAL at 21:34

## 2023-04-01 RX ADMIN — MULTIPLE VITAMINS W/ MINERALS TAB 1 TABLET: TAB at 08:54

## 2023-04-01 RX ADMIN — AMOXICILLIN AND CLAVULANATE POTASSIUM 1 TABLET: 875; 125 TABLET, FILM COATED ORAL at 21:34

## 2023-04-01 RX ADMIN — ENOXAPARIN SODIUM 40 MG: 100 INJECTION SUBCUTANEOUS at 08:54

## 2023-04-01 RX ADMIN — CHOLECALCIFEROL TAB 10 MCG (400 UNIT) 10 MCG: 10 TAB at 08:56

## 2023-04-01 RX ADMIN — ASPIRIN 81 MG: 81 TABLET, COATED ORAL at 08:54

## 2023-04-01 ASSESSMENT — ACTIVITIES OF DAILY LIVING (ADL)
ADLS_ACUITY_SCORE: 58
ADLS_ACUITY_SCORE: 56
ADLS_ACUITY_SCORE: 58
ADLS_ACUITY_SCORE: 56
ADLS_ACUITY_SCORE: 58
ADLS_ACUITY_SCORE: 58
ADLS_ACUITY_SCORE: 56

## 2023-04-01 NOTE — PROGRESS NOTES
[facility]    Medicine Progress Note - Hospitalist Service    Date of Admission:  3/20/2023    Assessment & Plan   Matheus Nieves is a 68 year old male admitted for placement, found to have a pulmonary abscess vs malignancy.  Remains clinically stable, awaiting placement.     # Possible pulmonary abscess vs malignancy  - pip/tazo, transition to augmentin to complete 14 days abx (3/25-4/8)  - outpatient pulmonology followup, repeat CT chest in 6 weeks     # Aspiration  - modified diet per speech (thin liquids per patient request)     # Hepatitis C  - outpatient hepatology referral     # Decubitus ulcers  - wound care         Gallagher Catheter: Not present  Lines: None     Cardiac Monitoring: None  Code Status: No CPR- Do NOT Intubate      Clinically Significant Risk Factors              # Hypoalbuminemia: Lowest albumin = 2.7 g/dL at 3/22/2023  7:41 AM, will monitor as appropriate                   Disposition Plan      Expected Discharge Date: 04/03/2023    Discharge Delays: *Medically Ready for Discharge  Placement - LTC  Destination: long-term care facility  Discharge Comments: referals out for TCU/LTC          Demetris Machado MD  Hospitalist Service  [facility]  Securely message with [employer] (more info)  Text page via [employer] Paging/Directory   ______________________________________________________________________    Interval History   Denies chest pain/pressure, SOB, or abdominal pain.    Physical Exam   Vital Signs: Temp: 97.8  F (36.6  C) Temp src: Oral BP: (!) 114/25 Pulse: 68   Resp: 17 SpO2: 93 % O2 Device: None (Room air)    Weight: 158 lbs 8.17 oz    Gen: lying comfortably in bed, nad  CV: nl rate, regular rhythm  Pulm: no acute resp distress, ctab anteriorly  GI: abdomen soft, NTTP    Medical Decision Making             Data

## 2023-04-01 NOTE — PLAN OF CARE
Goal Outcome Evaluation:  Pt calm and cooperative. AO x3, disoriented to situation. Denied pain. IV patent. Primofit in use; duran output. VSS.     Problem: Plan of Care - These are the overarching goals to be used throughout the patient stay.    Goal: Absence of Hospital-Acquired Illness or Injury  Intervention: Identify and Manage Fall Risk  Recent Flowsheet Documentation  Taken 3/31/2023 2328 by Magui Peters, RN  Safety Promotion/Fall Prevention:    clutter free environment maintained    fall prevention program maintained    increased rounding and observation    increase visualization of patient    lighting adjusted    nonskid shoes/slippers when out of bed    patient and family education    room door open    room near nurse's station    room organization consistent    safety round/check completed  Taken 3/31/2023 2132 by Magui Peters, RN  Safety Promotion/Fall Prevention:    clutter free environment maintained    fall prevention program maintained    increased rounding and observation    increase visualization of patient    lighting adjusted    nonskid shoes/slippers when out of bed    patient and family education    room door open    room near nurse's station    room organization consistent    safety round/check completed

## 2023-04-01 NOTE — PROGRESS NOTES
Care Management Follow Up    Length of Stay (days): 4    Expected Discharge Date: 04/03/2023     Concerns to be Addressed: discharge planning     Patient plan of care discussed at interdisciplinary rounds: Yes    Anticipated Discharge Disposition:  (long term care - TBD)  Disposition Comments: long term care TBD  Anticipated Discharge Services: Transportation Services  Anticipated Discharge DME:  (TBD)    Patient/family educated on Medicare website which has current facility and service quality ratings:    Education Provided on the Discharge Plan:    Patient/Family in Agreement with the Plan: yes    Referrals Placed by CM/SW:  (CM contacted [facility])  Private pay costs discussed: Not applicable    Additional Information:  CM following. [facility]. Adult [facility] involved       Jacy Bowen RN

## 2023-04-01 NOTE — PLAN OF CARE
Pt has been resting easy in bed. He did get up with assist x1 to pivot to [facility]. Otherwise he has been q2 turns. His appetite has been good throughout the day. Liquids have been mildly thickened and pt has tolerated this well. Pills were given one at a time in applesauce. No pain reported throughout the shift. Male purwick in place. Bed in lowest position, call light within reach, bed alarm armed.       Goal Outcome Evaluation:         Problem: Plan of Care - These are the overarching goals to be used throughout the patient stay.    Goal: Optimal Comfort and Wellbeing  Outcome: Progressing     Problem: Skin Injury Risk Increased  Goal: Skin Health and Integrity  Outcome: Progressing     Problem: Swallowing Impairment  Goal: Optimal Eating and Swallowing Without Aspiration  Outcome: Progressing           Problem: Plan of Care - These are the overarching goals to be used throughout the patient stay.    Goal: Absence of Hospital-Acquired Illness or Injury  Intervention: Identify and Manage Fall Risk  Recent Flowsheet Documentation  Taken 4/1/2023 1632 by Cheryl Leong, RN  Safety Promotion/Fall Prevention:   clutter free environment maintained   fall prevention program maintained   increased rounding and observation   increase visualization of patient   lighting adjusted   nonskid shoes/slippers when out of bed   patient and family education   room door open   room near nurse's station   room organization consistent   safety round/check completed  Taken 4/1/2023 0854 by Cheryl Leong RN  Safety Promotion/Fall Prevention:   clutter free environment maintained   fall prevention program maintained   increased rounding and observation   increase visualization of patient   lighting adjusted   nonskid shoes/slippers when out of bed   patient and family education   room door open   room near nurse's station   room organization consistent   safety round/check completed

## 2023-04-02 PROCEDURE — 250N000013 HC RX MED GY IP 250 OP 250 PS 637: Performed by: INTERNAL MEDICINE

## 2023-04-02 PROCEDURE — 250N000013 HC RX MED GY IP 250 OP 250 PS 637: Performed by: HOSPITALIST

## 2023-04-02 PROCEDURE — 120N000001 HC R&B MED SURG/OB

## 2023-04-02 PROCEDURE — 99207 PR CDG-CUT & PASTE-POTENTIAL IMPACT ON LEVEL: CPT | Performed by: HOSPITALIST

## 2023-04-02 PROCEDURE — 99232 SBSQ HOSP IP/OBS MODERATE 35: CPT | Performed by: HOSPITALIST

## 2023-04-02 PROCEDURE — 250N000011 HC RX IP 250 OP 636: Performed by: HOSPITALIST

## 2023-04-02 RX ORDER — LIDOCAINE 40 MG/G
CREAM TOPICAL
Status: DISCONTINUED | OUTPATIENT
Start: 2023-04-02 | End: 2023-04-15 | Stop reason: HOSPADM

## 2023-04-02 RX ADMIN — Medication 100 MG: at 08:56

## 2023-04-02 RX ADMIN — ENOXAPARIN SODIUM 40 MG: 100 INJECTION SUBCUTANEOUS at 08:56

## 2023-04-02 RX ADMIN — MIRTAZAPINE 45 MG: 30 TABLET, FILM COATED ORAL at 21:04

## 2023-04-02 RX ADMIN — AMOXICILLIN AND CLAVULANATE POTASSIUM 1 TABLET: 875; 125 TABLET, FILM COATED ORAL at 21:04

## 2023-04-02 RX ADMIN — ASPIRIN 81 MG: 81 TABLET, COATED ORAL at 08:56

## 2023-04-02 RX ADMIN — MULTIPLE VITAMINS W/ MINERALS TAB 1 TABLET: TAB at 08:56

## 2023-04-02 RX ADMIN — MICONAZOLE NITRATE: 2 POWDER TOPICAL at 21:08

## 2023-04-02 RX ADMIN — MICONAZOLE NITRATE: 2 POWDER TOPICAL at 15:34

## 2023-04-02 RX ADMIN — DOCUSATE SODIUM 100 MG: 100 CAPSULE, LIQUID FILLED ORAL at 21:04

## 2023-04-02 RX ADMIN — CHOLECALCIFEROL TAB 10 MCG (400 UNIT) 10 MCG: 10 TAB at 08:56

## 2023-04-02 RX ADMIN — AMOXICILLIN AND CLAVULANATE POTASSIUM 1 TABLET: 875; 125 TABLET, FILM COATED ORAL at 08:56

## 2023-04-02 RX ADMIN — DOCUSATE SODIUM 100 MG: 100 CAPSULE, LIQUID FILLED ORAL at 08:56

## 2023-04-02 RX ADMIN — ATORVASTATIN CALCIUM 10 MG: 10 TABLET, FILM COATED ORAL at 21:04

## 2023-04-02 ASSESSMENT — ACTIVITIES OF DAILY LIVING (ADL)
ADLS_ACUITY_SCORE: 57
ADLS_ACUITY_SCORE: 61
ADLS_ACUITY_SCORE: 57
ADLS_ACUITY_SCORE: 57
ADLS_ACUITY_SCORE: 61
ADLS_ACUITY_SCORE: 57
ADLS_ACUITY_SCORE: 61
ADLS_ACUITY_SCORE: 57
ADLS_ACUITY_SCORE: 57
ADLS_ACUITY_SCORE: 61

## 2023-04-02 NOTE — PROGRESS NOTES
Care Management Follow Up    Length of Stay (days): 5    Expected Discharge Date: 04/03/2023     Concerns to be Addressed: discharge planning     Patient plan of care discussed at interdisciplinary rounds: Yes    Anticipated Discharge Disposition:  (long term care - TBD)  Disposition Comments: long term care TBD  Anticipated Discharge Services: Transportation Services  Anticipated Discharge DME:  (TBD)    Patient/family educated on Medicare website which has current facility and service quality ratings:    Education Provided on the Discharge Plan:    Patient/Family in Agreement with the Plan: yes    Referrals Placed by CM/SW:  (CM contacted [facility])  Private pay costs discussed: Not applicable    Additional Information:  Needs LTC placement. Many referrals pending. SWAT escalation form completed and sent to supervisor and manager. Unsure what payor source for LTC is. Appears daughter was working on MA lewis with [facility]       Jacy Bowen RN

## 2023-04-02 NOTE — PLAN OF CARE
Goal Outcome Evaluation:  Pt AO x3; pt disoriented to situation. Flat affect. Calm and cooperative. Denied pain. PIV patent. PrimoFit removed, doesn't work for pt; leaks. Q2 turn. Mildly thick liquids. Pills given whole in apple sauce. VSS.        Problem: Plan of Care - These are the overarching goals to be used throughout the patient stay.    Goal: Absence of Hospital-Acquired Illness or Injury  Intervention: Identify and Manage Fall Risk  Recent Flowsheet Documentation  Taken 4/2/2023 0041 by Magui Peters, RN  Safety Promotion/Fall Prevention:   clutter free environment maintained   fall prevention program maintained   increased rounding and observation   increase visualization of patient   lighting adjusted   nonskid shoes/slippers when out of bed   patient and family education   room door open   room near nurse's station   room organization consistent   safety round/check completed  Taken 4/1/2023 2140 by Magui Peters, RN  Safety Promotion/Fall Prevention:   clutter free environment maintained   fall prevention program maintained   increased rounding and observation   increase visualization of patient   lighting adjusted   nonskid shoes/slippers when out of bed   patient and family education   room door open   room near nurse's station   room organization consistent   safety round/check completed

## 2023-04-02 NOTE — PROGRESS NOTES
[facility]    Medicine Progress Note - Hospitalist Service    Date of Admission:  3/20/2023    Assessment & Plan   Matheus Nieves  is a 68 year old male admitted for placement, found to have a pulmonary abscess vs malignancy.  Remains clinically stable, awaiting placement.       # Possible pulmonary abscess vs malignancy  - pip/tazo, transition to augmentin to complete 14 days abx (3/25-4/8)  - outpatient pulmonology followup, repeat CT chest in 6 weeks     # Aspiration  - modified diet per speech (thin liquids per patient request)     # Hepatitis C  - outpatient hepatology referral     # Decubitus ulcers  - wound care      Gallagher Catheter: Not present  Lines: None     Cardiac Monitoring: None  Code Status: No CPR- Do NOT Intubate      Clinically Significant Risk Factors              # Hypoalbuminemia: Lowest albumin = 2.7 g/dL at 3/22/2023  7:41 AM, will monitor as appropriate                   Disposition Plan      Expected Discharge Date: 04/03/2023    Discharge Delays: *Medically Ready for Discharge  Placement - LTC  Destination: long-term care facility  Discharge Comments: referals out for TCU/LTC          Demetris Machado MD  Hospitalist Service  [facility]  Securely message with [employer] (more info)  Text page via [employer] Paging/Directory   ______________________________________________________________________    Interval History   Denies chest pain/pressure, SOB, or abdominal pain.    Physical Exam   Vital Signs: Temp: 98.1  F (36.7  C) Temp src: Axillary BP: 121/75 Pulse: 69   Resp: 18 SpO2: 93 % O2 Device: None (Room air)    Weight: 162 lbs 11.19 oz    Gen:  Sitting comfortably in bed, nad  CV: nl rate, regular rhythm  Pulm: no acute resp distress, ctab anteriorly  Neuro: alert    Medical Decision Making             Data

## 2023-04-03 PROBLEM — B37.9 CANDIDA INFECTION: Status: ACTIVE | Noted: 2023-04-03

## 2023-04-03 PROCEDURE — 99231 SBSQ HOSP IP/OBS SF/LOW 25: CPT | Performed by: HOSPITALIST

## 2023-04-03 PROCEDURE — 250N000013 HC RX MED GY IP 250 OP 250 PS 637: Performed by: HOSPITALIST

## 2023-04-03 PROCEDURE — 250N000013 HC RX MED GY IP 250 OP 250 PS 637: Performed by: INTERNAL MEDICINE

## 2023-04-03 PROCEDURE — 99207 PR CDG-CUT & PASTE-POTENTIAL IMPACT ON LEVEL: CPT | Performed by: HOSPITALIST

## 2023-04-03 PROCEDURE — 120N000001 HC R&B MED SURG/OB

## 2023-04-03 PROCEDURE — 250N000011 HC RX IP 250 OP 636: Performed by: HOSPITALIST

## 2023-04-03 RX ADMIN — Medication 100 MG: at 08:20

## 2023-04-03 RX ADMIN — MIRTAZAPINE 45 MG: 30 TABLET, FILM COATED ORAL at 21:18

## 2023-04-03 RX ADMIN — MULTIPLE VITAMINS W/ MINERALS TAB 1 TABLET: TAB at 08:20

## 2023-04-03 RX ADMIN — ASPIRIN 81 MG: 81 TABLET, COATED ORAL at 08:20

## 2023-04-03 RX ADMIN — ATORVASTATIN CALCIUM 10 MG: 10 TABLET, FILM COATED ORAL at 21:18

## 2023-04-03 RX ADMIN — MICONAZOLE NITRATE: 2 POWDER TOPICAL at 08:19

## 2023-04-03 RX ADMIN — ENOXAPARIN SODIUM 40 MG: 100 INJECTION SUBCUTANEOUS at 08:19

## 2023-04-03 RX ADMIN — MICONAZOLE NITRATE: 2 POWDER TOPICAL at 21:19

## 2023-04-03 RX ADMIN — AMOXICILLIN AND CLAVULANATE POTASSIUM 1 TABLET: 875; 125 TABLET, FILM COATED ORAL at 08:20

## 2023-04-03 RX ADMIN — DOCUSATE SODIUM 100 MG: 100 CAPSULE, LIQUID FILLED ORAL at 21:18

## 2023-04-03 RX ADMIN — CHOLECALCIFEROL TAB 10 MCG (400 UNIT) 10 MCG: 10 TAB at 08:21

## 2023-04-03 RX ADMIN — AMOXICILLIN AND CLAVULANATE POTASSIUM 1 TABLET: 875; 125 TABLET, FILM COATED ORAL at 21:19

## 2023-04-03 ASSESSMENT — ACTIVITIES OF DAILY LIVING (ADL)
ADLS_ACUITY_SCORE: 56
ADLS_ACUITY_SCORE: 56
ADLS_ACUITY_SCORE: 60
ADLS_ACUITY_SCORE: 57
ADLS_ACUITY_SCORE: 56
ADLS_ACUITY_SCORE: 60

## 2023-04-03 NOTE — PROGRESS NOTES
Care Management Follow Up    Length of Stay (days): 6    Expected Discharge Date: 04/04/2023     Concerns to be Addressed: discharge planning       Additional Information:  CM following for LTC placement. Referrals out, awaiting acceptance.       Mone Morales, DEMETRIO    Destination    Service Provider Request Status Selected Services Address Phone Fax Patient Preferred   [facility] [place] ([facility])  Pending - Request Sent N/A [address] [facility] [address] [phone] --   Internal Comment last updated by Nova Coffman MA 3/31/2023 1053    3/31 1051 LVM-sb  3/30 LVM for Aly, requested a call back-sb  3/28 VM from Aly- No beds until 3/30-sb  3/27 1140 LVM for Aly-sb  3/24 1032 LVM for Aly-sb            [facility] [place] ()  Pending - Request Sent N/A [address] [place] [id number] [phone] --   Internal Comment last updated by Jey Berry MA 3/31/2023 0756    3/31 0756 sent email to [facility]  3/30 0843 Sent email to [facility]  3/29 0845 sent email to [facility] ~  3/28 0841 sent email to [facility]  3/27 0937 sent email to [facility] ([facility])  Pending - Request Sent N/A [address] [facility] [address] [phone] --   Internal Comment last updated by Jey Berry MA 3/31/2023 0756    3/31 0756 sent email to [facility]  3/30 0843 Sent email to [facility]  3/29 0845 sent email to [facility]  3/28 0841 sent email to [facility]  3/27 0937 sent email to [facility] ()  Pending - Request Sent N/A [address]  [facility] [address] [phone] --   Internal Comment last updated by Jey Berry MA 3/31/2023 0756    3/31 0756 sent email to Reji daniels  3/30 0843 Sent email to Reji daniels  3/29 0845 sent email to Reji daniels  3/28 0841 sent email to Momo  3/27 0937 sent email to Reji daniels            [facility] ([facility])  Pending - Request Sent N/A [address] [facility] [address] [facility] [address] [phone] --   Internal Comment last updated by Jey Berry MA 3/31/2023 0757    3/31 0756 sent email to Reji ~ap  3/30 0843 Sent email to Reji ~ap  3/29 0845 sent email to Reji ~ap  3/28 0841 sent email to Reij~ap  3/27 0937 sent email to Reji ~ap            [facility] ([facility])  Pending - Request Sent N/A [address] [facility] [address] [phone] --   Internal Comment last updated by Caitlyn Bennett MA 3/31/2023 0953    3/30 1013 response from Destiney - she only received face sheet, needs SW to fax full referral *en      3/30 0938 LVM for Destiney *en             [facility]AFTER HOURS) ([facility])  Pending - Request Sent N/A [address] [facility] [address] [phone] --   Internal Comment last updated by Caitlyn Bennett MA 4/3/2023 0905    4/3 0905 emailed Diana *en      3/31 0927 emailed Diana *en   3/30 0914 emailed Diana *en             [facility] ([facility])  Pending - Request Sent N/A [address] [facility] [address] [phone] --   Internal Comment last updated by Caitlyn Bennett MA 3/31/2023 1108    3/31 1106 emailed Kerrie *en   3/30 1219 emailed Kerrie *en             [facility] ([facility])  Pending - Request Sent N/A [address] [facility] [address] [phone] --   Internal Comment last updated by Jey Berry MA 3/31/2023 0757    3/31 0756 sent email to Reji ~ap  3/30 0843 Sent email to Reji ~ap  3/29 0845 sent email to Reji daniels            [facility] ([facility])  Pending - Request Sent N/A [address], [place] [address] [phone] --   Internal Comment last updated by Jey Berry MA 3/31/2023 0757    3/31 0756 sent email to Reji daniels  3/30 0843 Sent email to Reji daniels  3/29 0845 sent email to Reji daniels            [facility] [place])  Pending - Request Sent N/A [address] [facility]  [address] [facility] [address] [phone] --   Internal Comment last updated by Jey Berry MA 3/31/2023 0757    3/31 0756 [facility]  3/30 0843 Sent email to [facility]  3/29 0845 sent [facility] to [facility]            [facility] ([facility])  Pending - Request Sent N/A [address], [place] [address] [phone] --   Internal Comment last updated by Jey Berry MA 3/31/2023 0757    3/31 0756 sent email to [facility]  3/30 0843 Sent email to [facility]  3/29 0845 sent [facility] to [facility]            [facility] - Referral Only (SNF)  Declined   No longer accepting referrals due to closure and relocation N/A [address] [address] [address] [phone] --   [facility] - REFERRAL ONLY ([facility])  Declined   Bed Not Available N/A [address] [facility] [id number] [phone] --   Internal Comment last updated by Nova Coffman MA 3/27/2023 0943    3/27 0842 follow-up email sent to Geronimo

## 2023-04-03 NOTE — PROGRESS NOTES
[facility]    Medicine Progress Note - Hospitalist Service    Date of Admission:  3/20/2023    Assessment & Plan   Matheus Nieves is a 68 year old male admitted for placement, found to have a pulmonary abscess vs malignancy.  Remains clinically stable, awaiting placement.    # Possible pulmonary abscess vs malignancy  - pip/tazo, transition to augmentin to complete 14 days abx (3/25-4/8)  - outpatient pulmonology followup, repeat CT chest in 6 weeks     # Aspiration  - modified diet per speech (thin liquids per patient request)     # Hepatitis C  - outpatient hepatology referral     # Decubitus ulcers  - wound care    # Candida skin infection of bilateral groin, scrotum  - miconazole powder      Gallagher Catheter: Not present  Lines: None     Cardiac Monitoring: None  Code Status: No CPR- Do NOT Intubate      Clinically Significant Risk Factors              # Hypoalbuminemia: Lowest albumin = 2.7 g/dL at 3/22/2023  7:41 AM, will monitor as appropriate                   Disposition Plan      Expected Discharge Date: 04/04/2023    Discharge Delays: *Medically Ready for Discharge  Placement - LTC  Destination: long-term care facility  Discharge Comments: referals out for TCU/LTC          Demetris Machado MD  Hospitalist Service  [facility]  Securely message with [employer] (more info)  Text page via [employer] Paging/Directory   ______________________________________________________________________    Interval History   Denies chest pain/pressure, SOB, or abdominal pain.  Denies pain in groin, endorses itching.    Physical Exam   Vital Signs: Temp: 97.7  F (36.5  C) Temp src: Oral BP: 112/75 Pulse: 76   Resp: 18 SpO2: 94 % O2 Device: None (Room air)    Weight: 165 lbs 9.05 oz    Gen: in no extremis  CV: nl rate, regular rhythm  Pulm: no acute resp distress, ctab anteriorly  GI: abdomen soft, NTTP  Skin: erythema in bilateral groin with satellite lesions; erythema involving the  scrotum    Medical Decision Making             Data

## 2023-04-03 NOTE — PLAN OF CARE
Pt has been resting in bed in no acute distress. He has participated in cares and has helped staff with q2 turns. Redness noted in groin area. MD aware and powder applied to affected area. His appetite has been good and he has been able to eat by himself after set up. No pain reported during shift. Bed in lowest position, call light within reach, bed alarm armed.       Goal Outcome Evaluation:         Problem: Plan of Care - These are the overarching goals to be used throughout the patient stay.    Goal: Absence of Hospital-Acquired Illness or Injury  Outcome: Progressing  Intervention: Identify and Manage Fall Risk  Recent Flowsheet Documentation  Taken 4/2/2023 1521 by Cheryl Leong, RN  Safety Promotion/Fall Prevention:   clutter free environment maintained   increased rounding and observation   nonskid shoes/slippers when out of bed   room near nurse's station  Taken 4/2/2023 0856 by Cheryl Leong, RN  Safety Promotion/Fall Prevention:   clutter free environment maintained   increased rounding and observation   nonskid shoes/slippers when out of bed   room near nurse's station  Goal: Optimal Comfort and Wellbeing  Outcome: Progressing     Problem: Skin Injury Risk Increased  Goal: Skin Health and Integrity  Outcome: Progressing

## 2023-04-03 NOTE — PLAN OF CARE
Goal Outcome Evaluation:  Pt AO x3; pt disoriented to situation. Flat affect. Calm and cooperative. Denied pain. PIV patent.  Q2 turn. Mildly thick liquids. Pills given whole in apple sauce. VSS.         Problem: Plan of Care - These are the overarching goals to be used throughout the patient stay.    Goal: Absence of Hospital-Acquired Illness or Injury  Intervention: Identify and Manage Fall Risk  Recent Flowsheet Documentation  Taken 4/3/2023 0121 by Magui Peters, RN  Safety Promotion/Fall Prevention:   clutter free environment maintained   increased rounding and observation   nonskid shoes/slippers when out of bed   room near nurse's station  Taken 4/2/2023 2100 by Magui Peters, RN  Safety Promotion/Fall Prevention:   clutter free environment maintained   increased rounding and observation   nonskid shoes/slippers when out of bed   room near nurse's station

## 2023-04-03 NOTE — PLAN OF CARE
Problem: Plan of Care - These are the overarching goals to be used throughout the patient stay.    Goal: Optimal Comfort and Wellbeing  Outcome: Progressing   Goal Outcome Evaluation:  Pt pleasant and cooperative throughout this morning.  He transferred to the recliner for breakfast and now has transferred back to bed.  Rash noted in pt's groin, rounding done with MD he assessed rash.  Pt denies any concerns, awaiting placement.

## 2023-04-03 NOTE — PLAN OF CARE
Problem: Plan of Care - These are the overarching goals to be used throughout the patient stay.    Goal: Optimal Comfort and Wellbeing  Outcome: Progressing     Problem: Skin Injury Risk Increased  Goal: Skin Health and Integrity  Outcome: Progressing     Patient is alert and orient to self/place/time. VSS on room air. Assist of 2 with transfers using gait belt and walker. Q 2 hour turning and repositioning offered.     Plan: Discharge pending LTC placement

## 2023-04-04 PROCEDURE — 94640 AIRWAY INHALATION TREATMENT: CPT

## 2023-04-04 PROCEDURE — 120N000001 HC R&B MED SURG/OB

## 2023-04-04 PROCEDURE — 250N000013 HC RX MED GY IP 250 OP 250 PS 637: Performed by: HOSPITALIST

## 2023-04-04 PROCEDURE — G0463 HOSPITAL OUTPT CLINIC VISIT: HCPCS

## 2023-04-04 PROCEDURE — 250N000011 HC RX IP 250 OP 636: Performed by: HOSPITALIST

## 2023-04-04 PROCEDURE — 99231 SBSQ HOSP IP/OBS SF/LOW 25: CPT | Performed by: HOSPITALIST

## 2023-04-04 PROCEDURE — 250N000009 HC RX 250: Performed by: HOSPITALIST

## 2023-04-04 PROCEDURE — 250N000013 HC RX MED GY IP 250 OP 250 PS 637: Performed by: INTERNAL MEDICINE

## 2023-04-04 PROCEDURE — 999N000157 HC STATISTIC RCP TIME EA 10 MIN

## 2023-04-04 RX ORDER — ALBUTEROL SULFATE 0.83 MG/ML
2.5 SOLUTION RESPIRATORY (INHALATION) ONCE
Status: COMPLETED | OUTPATIENT
Start: 2023-04-04 | End: 2023-04-04

## 2023-04-04 RX ORDER — ALBUTEROL SULFATE 0.83 MG/ML
2.5 SOLUTION RESPIRATORY (INHALATION) EVERY 6 HOURS PRN
Status: DISCONTINUED | OUTPATIENT
Start: 2023-04-04 | End: 2023-04-15 | Stop reason: HOSPADM

## 2023-04-04 RX ADMIN — MULTIPLE VITAMINS W/ MINERALS TAB 1 TABLET: TAB at 08:52

## 2023-04-04 RX ADMIN — DOCUSATE SODIUM 100 MG: 100 CAPSULE, LIQUID FILLED ORAL at 08:52

## 2023-04-04 RX ADMIN — ENOXAPARIN SODIUM 40 MG: 100 INJECTION SUBCUTANEOUS at 08:52

## 2023-04-04 RX ADMIN — MIRTAZAPINE 45 MG: 30 TABLET, FILM COATED ORAL at 20:21

## 2023-04-04 RX ADMIN — DOCUSATE SODIUM 100 MG: 100 CAPSULE, LIQUID FILLED ORAL at 20:21

## 2023-04-04 RX ADMIN — CHOLECALCIFEROL TAB 10 MCG (400 UNIT) 10 MCG: 10 TAB at 08:52

## 2023-04-04 RX ADMIN — ATORVASTATIN CALCIUM 10 MG: 10 TABLET, FILM COATED ORAL at 20:21

## 2023-04-04 RX ADMIN — MICONAZOLE NITRATE: 2 POWDER TOPICAL at 20:27

## 2023-04-04 RX ADMIN — AMOXICILLIN AND CLAVULANATE POTASSIUM 1 TABLET: 875; 125 TABLET, FILM COATED ORAL at 08:52

## 2023-04-04 RX ADMIN — Medication 100 MG: at 08:52

## 2023-04-04 RX ADMIN — ASPIRIN 81 MG: 81 TABLET, COATED ORAL at 08:52

## 2023-04-04 RX ADMIN — AMOXICILLIN AND CLAVULANATE POTASSIUM 1 TABLET: 875; 125 TABLET, FILM COATED ORAL at 20:21

## 2023-04-04 RX ADMIN — ALBUTEROL SULFATE 2.5 MG: 2.5 SOLUTION RESPIRATORY (INHALATION) at 09:02

## 2023-04-04 RX ADMIN — MICONAZOLE NITRATE: 2 POWDER TOPICAL at 08:52

## 2023-04-04 ASSESSMENT — ACTIVITIES OF DAILY LIVING (ADL)
ADLS_ACUITY_SCORE: 60
ADLS_ACUITY_SCORE: 54
ADLS_ACUITY_SCORE: 58
ADLS_ACUITY_SCORE: 60
ADLS_ACUITY_SCORE: 58

## 2023-04-04 NOTE — PROGRESS NOTES
RCAT Treatment Plan    Patient Score: 6  Patient Acuity: 4    Clinical Indication for Therapy: abscess of middle right lobe, wheezing per MD    Therapy Ordered: alb neb x 1, Ordering Q6 prn Albuterol per RCAT eval    Assessment Summary: Pt on RA, SPO2 92%, BS clear and diminished. Pt use to smoke. Pt does not have any respiratory meds at home or oxygen. Pt does not have pneumonia.     Chantelle Peters, RT  4/4/2023

## 2023-04-04 NOTE — PLAN OF CARE
Problem: Plan of Care - These are the overarching goals to be used throughout the patient stay.    Goal: Plan of Care Review  Description: The Plan of Care Review/Shift note should be completed every shift.  The Outcome Evaluation is a brief statement about your assessment that the patient is improving, declining, or no change.  This information will be displayed automatically on your shift note.  Outcome: Progressing     Patient is alert and orientated times three. Disorientated to situation. He denied pain when asked. PIV saline locked. External catheter in place. Turn and reposition q2h. Bed alarm in place for patient's safety. Awaiting LTC placement.

## 2023-04-04 NOTE — PLAN OF CARE
Problem: Skin Injury Risk Increased  Goal: Skin Health and Integrity  Outcome: Progressing  Patient alert and oriented times four. Call light and belongings within reach. Patient tolerating a regular diet and thin liquids, no coughing noted this shift. Patient turned and repositioned every two hours and as needed. Niki-cares completed and miconazole powder applied to groin. Male external catheter in place, patient voiding adequately. Patient denied pain throughout the shift. Affect remains flat and withdrawn. Patient tearful at times, reports he is sick of being here, MD notified.   Sharonda Clinton RN  4/4/2023

## 2023-04-04 NOTE — PROGRESS NOTES
[facility]    Medicine Progress Note - Hospitalist Service    Date of Admission:  3/20/2023    Assessment & Plan   Matheus Nieves is a 68 year old male admitted for placement, found to have a pulmonary abscess vs malignancy.  Remains clinically stable, awaiting placement.     # Possible pulmonary abscess vs malignancy  - pip/tazo, transition to augmentin to complete 14 days abx (3/25-4/8)  - outpatient pulmonology followup, repeat CT chest in 6 weeks     # Aspiration  - modified diet per speech (thin liquids per patient request)     # Hepatitis C  - outpatient hepatology referral     # Decubitus ulcers  - wound care     # Candida skin infection of bilateral groin, scrotum  - miconazole powder      Gallagher Catheter: Not present  Lines: None     Cardiac Monitoring: None  Code Status: No CPR- Do NOT Intubate      Clinically Significant Risk Factors              # Hypoalbuminemia: Lowest albumin = 2.7 g/dL at 3/22/2023  7:41 AM, will monitor as appropriate                   Disposition Plan      Expected Discharge Date: 04/06/2023    Discharge Delays: *Medically Ready for Discharge  Placement - LTC  Other (Add Comment)  Destination: long-term care facility  Discharge Comments: referals out for TCU/LTC  MA pending          Demetris Machado MD  Hospitalist Service  [facility]  Securely message with [employer] (more info)  Text page via [employer] Paging/Directory   ______________________________________________________________________    Interval History   Denies chest pain/pressure, SOB, or abdominal pain.    Physical Exam   Vital Signs: Temp: 97.6  F (36.4  C) Temp src: Oral BP: 131/82 Pulse: 68   Resp: 16 SpO2: 93 % O2 Device: None (Room air)    Weight: 162 lbs 11.19 oz    Gen: lying comfortably in bed, nad; alert  CV: nl rate, regular rhythm  Pulm: no acute resp distress, wheezing on right anteriorly  GI: abdomen soft, NTTP    Medical Decision Making             Data

## 2023-04-04 NOTE — PLAN OF CARE
Problem: Skin Injury Risk Increased  Goal: Skin Health and Integrity  Outcome: Progressing  Intervention: Optimize Skin Protection  Recent Flowsheet Documentation  Taken 4/3/2023 2121 by Rosita Jimenez RN  Head of Bed (HOB) Positioning: HOB at 30-45 degrees    Alert and oriented x 3. Very quiet with flat affect. VSS. Lung sounds coarse. Denies SOB, chest pain and N/V. Tolerating easy chew diet with thin liquids per pt's preference. Continues to have cough with liquid intake. Will continue to monitor. Incontinent of B & B. External male catheter placed @ HS. Did not get OOB this shift. Repositioned q 2 hrs when in bed. Alarms on for safety.

## 2023-04-05 PROCEDURE — 250N000011 HC RX IP 250 OP 636: Performed by: HOSPITALIST

## 2023-04-05 PROCEDURE — 250N000013 HC RX MED GY IP 250 OP 250 PS 637: Performed by: INTERNAL MEDICINE

## 2023-04-05 PROCEDURE — 99231 SBSQ HOSP IP/OBS SF/LOW 25: CPT | Performed by: INTERNAL MEDICINE

## 2023-04-05 PROCEDURE — 120N000001 HC R&B MED SURG/OB

## 2023-04-05 PROCEDURE — 250N000013 HC RX MED GY IP 250 OP 250 PS 637: Performed by: HOSPITALIST

## 2023-04-05 RX ADMIN — ATORVASTATIN CALCIUM 10 MG: 10 TABLET, FILM COATED ORAL at 21:12

## 2023-04-05 RX ADMIN — MIRTAZAPINE 45 MG: 30 TABLET, FILM COATED ORAL at 21:12

## 2023-04-05 RX ADMIN — Medication 100 MG: at 09:40

## 2023-04-05 RX ADMIN — MICONAZOLE NITRATE: 2 POWDER TOPICAL at 09:43

## 2023-04-05 RX ADMIN — AMOXICILLIN AND CLAVULANATE POTASSIUM 1 TABLET: 875; 125 TABLET, FILM COATED ORAL at 21:12

## 2023-04-05 RX ADMIN — CHOLECALCIFEROL TAB 10 MCG (400 UNIT) 10 MCG: 10 TAB at 09:43

## 2023-04-05 RX ADMIN — MICONAZOLE NITRATE: 2 POWDER TOPICAL at 21:13

## 2023-04-05 RX ADMIN — DOCUSATE SODIUM 100 MG: 100 CAPSULE, LIQUID FILLED ORAL at 09:40

## 2023-04-05 RX ADMIN — ENOXAPARIN SODIUM 40 MG: 100 INJECTION SUBCUTANEOUS at 09:40

## 2023-04-05 RX ADMIN — ASPIRIN 81 MG: 81 TABLET, COATED ORAL at 09:40

## 2023-04-05 RX ADMIN — AMOXICILLIN AND CLAVULANATE POTASSIUM 1 TABLET: 875; 125 TABLET, FILM COATED ORAL at 09:40

## 2023-04-05 RX ADMIN — MULTIPLE VITAMINS W/ MINERALS TAB 1 TABLET: TAB at 09:40

## 2023-04-05 RX ADMIN — DOCUSATE SODIUM 100 MG: 100 CAPSULE, LIQUID FILLED ORAL at 21:12

## 2023-04-05 ASSESSMENT — ACTIVITIES OF DAILY LIVING (ADL)
ADLS_ACUITY_SCORE: 58

## 2023-04-05 NOTE — PLAN OF CARE
Problem: Plan of Care - These are the overarching goals to be used throughout the patient stay.    Goal: Plan of Care Review  Description: The Plan of Care Review/Shift note should be completed every shift.  The Outcome Evaluation is a brief statement about your assessment that the patient is improving, declining, or no change.  This information will be displayed automatically on your shift note.  Outcome: Progressing     Problem: Plan of Care - These are the overarching goals to be used throughout the patient stay.    Goal: Optimal Comfort and Wellbeing  Outcome: Progressing   Goal Outcome Evaluation:    Pt agreeable to cares; flat affect but cooperative. Morongo. Takes pills whole in applesauce; large pills separate. PrimoFit intact and patent; duran urine. Q2 turns.

## 2023-04-05 NOTE — PLAN OF CARE
Goal Outcome Evaluation:                        Problem: Plan of Care - These are the overarching goals to be used throughout the patient stay.    Goal: Readiness for Transition of Care  Outcome: Progressing       A/O x 3, not situation. Flat affect, withdrawn per baseline. Miconazole powder applied to groin for redness. [id number] and air mattress. Discharge pending LTC placement.

## 2023-04-05 NOTE — PROGRESS NOTES
Hospitalist Progress Note    Assessment/Plan  68-year-old male with medical history significant for hyperlipidemia, hypertension, mood disorder and a documented previous CVA, brought in by the [facility] officials as a vulnerable adult in need of placement    Possible pulmonary abscess vs malignancy  -continue pip/tazo, transition to augmentin to complete 14 days abx (3/25-4/8)  -Patient with follow-up outpatient pulmonology for repeat CT chest in 6 weeks     Aspiration  -modified diet per speech (thin liquids per patient request)     Hepatitis C  -outpatient hepatology referral     Decubitus ulcers  -wound care     Candida skin infection of bilateral groin, scrotum  -miconazole powder      Disposition: Pending progress.  DVT Prophylaxis: SCDs      Subjective  Denies any new complaints.  No acute events overnight.  Awaiting safe discharge plans.    Objective    Vital signs in last 24 hours  Temp:  [97.4  F (36.3  C)-97.9  F (36.6  C)] 97.9  F (36.6  C)  Pulse:  [65-77] 65  Resp:  [16-18] 16  BP: (102-124)/(71-79) 124/79  Cuff Mean (mmHg):  [75] 75  SpO2:  [91 %-96 %] 96 % @LASTSAO2(12)@ O2 Device: None (Room air)    Weight:   Wt Readings from Last 3 Encounters:   04/04/23 73.8 kg (162 lb 11.2 oz)      Weight change:     Intake/Output last 3 shifts  I/O last 3 completed shifts:  In: 840 [P.O.:840]  Out: 550 [Urine:550]  Body mass index is 23.34 kg/m .    Physical Exam    General Appearance:    Alert, cooperative, no distress, appears stated age   Lungs:     Clear bilaterally    Cardiovascular:    Regular rate ands rhythm.  Normal S1, S2.  No murmur, rub or gallop.  No edema   Abdomen:     Soft, non-tender, bowel sounds active all four quadrants,     no masses, no organomegaly   Neurologic:   Awake, alert, oriented x 3.  Grossly nonfocal      Pertinent Labs   Lab Results: personally reviewed.   No results for input(s): NA, CO2, BUN, CREATININE, ALBUMIN, BILITOT, ALKPHOS, ALT, AST, GLUCOSE in the last 168  hours.    Invalid input(s): K, CL, CALCIUM, LABALBU, PROT, MG  No results for input(s): WBC, HGB, HCT, PLT in the last 168 hours.    Invalid input(s): NEUTOPHILPCT, MONOPCT,  EOSPCT  No results for input(s): CKTOTAL, TROPONINI in the last 168 hours.    Invalid input(s): TROPONINT, CKMBINDEX  Invalid input(s): POCGLUFGR    Medications  Current Facility-Administered Medications   Medication     acetaminophen (TYLENOL) tablet 650 mg     albuterol (PROVENTIL) neb solution 2.5 mg     amoxicillin-clavulanate (AUGMENTIN) 875-125 MG per tablet 1 tablet     aspirin EC tablet 81 mg     atorvastatin (LIPITOR) tablet 10 mg     cholecalciferol (VITAMIN D3) 10 mcg (400 units) tablet 10 mcg     docusate sodium (COLACE) capsule 100 mg     enoxaparin ANTICOAGULANT (LOVENOX) injection 40 mg     lidocaine (LMX4) cream     lidocaine 1 % 0.1-1 mL     melatonin tablet 1 mg     miconazole (MICATIN) 2 % powder     mirtazapine (REMERON) tablet 45 mg     multivitamin w/minerals (THERA-VIT-M) tablet 1 tablet     ondansetron (ZOFRAN ODT) ODT tab 4 mg    Or     ondansetron (ZOFRAN) injection 4 mg     senna-docusate (SENOKOT-S/PERICOLACE) 8.6-50 MG per tablet 1 tablet    Or     senna-docusate (SENOKOT-S/PERICOLACE) 8.6-50 MG per tablet 2 tablet     sodium chloride (PF) 0.9% PF flush 3 mL     sodium chloride (PF) 0.9% PF flush 3 mL     thiamine (B-1) tablet 100 mg       Pertinent Radiology   Radiology Results: Personally reviewed   Results for orders placed or performed during the hospital encounter of 03/20/23   Head CT w/o contrast    Impression    IMPRESSION:  1.  No CT evidence for acute intracranial process.  2.  Brain atrophy and presumed chronic microvascular ischemic changes as above.       I spent a total of 33 minutes for this encounter with interval history, physical exam, in patient medication review and reconciliation.      Maddy Angeles DO  Internal Medicine Hospitalist  3/22/2023

## 2023-04-05 NOTE — PROGRESS NOTES
SPIRITUAL HEALTH SERVICES (SHS)  SPIRITUAL ASSESSMENT Progress Note  [facility]. [address]    REFERRAL SOURCE: HILARIA Nieves was lying on his bed listening to the tv when I entered. I introduced myself and the role of spiritual care but received no response from him. He had no affect, did not interact and simply stared, expressionless.According to the bedside nurse he is hard of hearing.      PLAN: SHS are available for emotional and spiritual support as needed or requested.      Kenya Bernardo, Ph.D., [facility]      SHS available 24/7 for emergency requests/referrals, either by having the on-call  paged or by entering an ASAP/STAT consult in Epic (this will also page the on-call ).

## 2023-04-05 NOTE — PROGRESS NOTES
"Care Management Follow Up    Length of Stay (days): 8    Expected Discharge Date: 04/06/2023     Concerns to be Addressed: discharge planning       Additional Information:  CM been following for discharge planning.   Spoke with dtr Amber. MA application was submitted on March 3 to [facility]. She was asked to get a signiture from her mom, as he is still , which is required for the application, and she says she has not done this, \"it would not be good for me to see her\".   Spoke with Gerry, Adult protection, asking what he knew. He knows that a EW assessment was requested, that the wait is 3 mos for this to happen. He also asked if she had gotten a signature as patient is still , re: assessts on the MA application. I told him she has not, and he will be following up with her. He says that the application cannot be fully completed with out her signature as she is still his spouse. He will speak with AMBER to assure that this gets done. I explained importance, as patient is ready to be discharged, but needing MA to find placement. He agrees and understands.      Mone Morales RN      "

## 2023-04-06 PROCEDURE — 120N000001 HC R&B MED SURG/OB

## 2023-04-06 PROCEDURE — 99231 SBSQ HOSP IP/OBS SF/LOW 25: CPT | Performed by: INTERNAL MEDICINE

## 2023-04-06 PROCEDURE — 250N000013 HC RX MED GY IP 250 OP 250 PS 637: Performed by: HOSPITALIST

## 2023-04-06 PROCEDURE — 250N000011 HC RX IP 250 OP 636: Performed by: HOSPITALIST

## 2023-04-06 PROCEDURE — 250N000013 HC RX MED GY IP 250 OP 250 PS 637: Performed by: INTERNAL MEDICINE

## 2023-04-06 RX ADMIN — DOCUSATE SODIUM 100 MG: 100 CAPSULE, LIQUID FILLED ORAL at 21:27

## 2023-04-06 RX ADMIN — AMOXICILLIN AND CLAVULANATE POTASSIUM 1 TABLET: 875; 125 TABLET, FILM COATED ORAL at 10:09

## 2023-04-06 RX ADMIN — MICONAZOLE NITRATE: 2 POWDER TOPICAL at 10:10

## 2023-04-06 RX ADMIN — Medication 100 MG: at 10:09

## 2023-04-06 RX ADMIN — ASPIRIN 81 MG: 81 TABLET, COATED ORAL at 10:09

## 2023-04-06 RX ADMIN — MULTIPLE VITAMINS W/ MINERALS TAB 1 TABLET: TAB at 10:09

## 2023-04-06 RX ADMIN — CHOLECALCIFEROL TAB 10 MCG (400 UNIT) 10 MCG: 10 TAB at 10:09

## 2023-04-06 RX ADMIN — DOCUSATE SODIUM 100 MG: 100 CAPSULE, LIQUID FILLED ORAL at 10:09

## 2023-04-06 RX ADMIN — MIRTAZAPINE 45 MG: 30 TABLET, FILM COATED ORAL at 21:27

## 2023-04-06 RX ADMIN — ENOXAPARIN SODIUM 40 MG: 100 INJECTION SUBCUTANEOUS at 10:08

## 2023-04-06 RX ADMIN — AMOXICILLIN AND CLAVULANATE POTASSIUM 1 TABLET: 875; 125 TABLET, FILM COATED ORAL at 21:27

## 2023-04-06 RX ADMIN — ATORVASTATIN CALCIUM 10 MG: 10 TABLET, FILM COATED ORAL at 21:27

## 2023-04-06 ASSESSMENT — ACTIVITIES OF DAILY LIVING (ADL)
ADLS_ACUITY_SCORE: 58
ADLS_ACUITY_SCORE: 54
ADLS_ACUITY_SCORE: 58

## 2023-04-06 NOTE — PROGRESS NOTES
Hospitalist Progress Note    Assessment/Plan  68-year-old male with medical history significant for hyperlipidemia, hypertension, mood disorder and a documented previous CVA, brought in by the [facility] officials as a vulnerable adult in need of placement    Possible pulmonary abscess vs malignancy  -continue pip/tazo, transition to augmentin to complete 14 days abx (3/25-4/8)  -Patient with follow-up outpatient pulmonology for repeat CT chest in 6 weeks     Aspiration  -modified diet per speech (thin liquids per patient request)     Hepatitis C  -outpatient hepatology referral     Decubitus ulcers  -wound care     Candida skin infection of bilateral groin, scrotum  -miconazole powder      Disposition: Pending progress.  DVT Prophylaxis: SCDs      Subjective  I have seen and examined this patient today (4/6/2023) and there are no changes to my notes/exam/care plan of yesterday (4/5/2023).  Denies any new complaints.  No acute events overnight.  Awaiting safe discharge plans.    Objective    Vital signs in last 24 hours  Temp:  [97.2  F (36.2  C)-98.5  F (36.9  C)] 97.2  F (36.2  C)  Pulse:  [63-70] 63  Resp:  [16-22] 16  BP: (109-132)/(74-92) 115/81  SpO2:  [93 %-94 %] 94 % @LASTSAO2(12)@ O2 Device: None (Room air)    Weight:   Wt Readings from Last 3 Encounters:   04/06/23 76.6 kg (168 lb 14 oz)      Weight change:     Intake/Output last 3 shifts  I/O last 3 completed shifts:  In: 784 [P.O.:784]  Out: 775 [Urine:775]  Body mass index is 24.23 kg/m .    Physical Exam    General Appearance:    Alert, cooperative, no distress, appears stated age   Lungs:     Clear bilaterally    Cardiovascular:    Regular rate ands rhythm.  Normal S1, S2.  No murmur, rub or gallop.  No edema   Abdomen:     Soft, non-tender, bowel sounds active all four quadrants,     no masses, no organomegaly   Neurologic:   Awake, alert, oriented x 3.  Grossly nonfocal      Pertinent Labs   Lab Results: personally reviewed.   No results for  input(s): NA, CO2, BUN, CREATININE, ALBUMIN, BILITOT, ALKPHOS, ALT, AST, GLUCOSE in the last 168 hours.    Invalid input(s): K, CL, CALCIUM, LABALBU, PROT, MG  No results for input(s): WBC, HGB, HCT, PLT in the last 168 hours.    Invalid input(s): NEUTOPHILPCT, MONOPCT,  EOSPCT  No results for input(s): CKTOTAL, TROPONINI in the last 168 hours.    Invalid input(s): TROPONINT, CKMBINDEX  Invalid input(s): POCGLUFGR    Medications  Current Facility-Administered Medications   Medication     acetaminophen (TYLENOL) tablet 650 mg     albuterol (PROVENTIL) neb solution 2.5 mg     amoxicillin-clavulanate (AUGMENTIN) 875-125 MG per tablet 1 tablet     aspirin EC tablet 81 mg     atorvastatin (LIPITOR) tablet 10 mg     cholecalciferol (VITAMIN D3) 10 mcg (400 units) tablet 10 mcg     docusate sodium (COLACE) capsule 100 mg     enoxaparin ANTICOAGULANT (LOVENOX) injection 40 mg     lidocaine (LMX4) cream     lidocaine 1 % 0.1-1 mL     melatonin tablet 1 mg     miconazole (MICATIN) 2 % powder     mirtazapine (REMERON) tablet 45 mg     multivitamin w/minerals (THERA-VIT-M) tablet 1 tablet     ondansetron (ZOFRAN ODT) ODT tab 4 mg    Or     ondansetron (ZOFRAN) injection 4 mg     senna-docusate (SENOKOT-S/PERICOLACE) 8.6-50 MG per tablet 1 tablet    Or     senna-docusate (SENOKOT-S/PERICOLACE) 8.6-50 MG per tablet 2 tablet     sodium chloride (PF) 0.9% PF flush 3 mL     sodium chloride (PF) 0.9% PF flush 3 mL     thiamine (B-1) tablet 100 mg       Pertinent Radiology   Radiology Results: Personally reviewed   Results for orders placed or performed during the hospital encounter of 03/20/23   Head CT w/o contrast    Impression    IMPRESSION:  1.  No CT evidence for acute intracranial process.  2.  Brain atrophy and presumed chronic microvascular ischemic changes as above.       I spent a total of 33 minutes for this encounter with interval history, physical exam, in patient medication review and reconciliation.      Maddy Angeles  DO  [facility]  3/22/2023

## 2023-04-06 NOTE — PLAN OF CARE
Problem: Plan of Care - These are the overarching goals to be used throughout the patient stay.    Goal: Readiness for Transition of Care  Outcome: Progressing   Goal Outcome Evaluation:                      A/O x 3, not situation. Flat affect, withdrawn per baseline. Miconazole powder applied to groin for redness. [id number] and air mattress. Discharge pending LTC placement.

## 2023-04-07 PROCEDURE — 250N000011 HC RX IP 250 OP 636: Performed by: HOSPITALIST

## 2023-04-07 PROCEDURE — 250N000013 HC RX MED GY IP 250 OP 250 PS 637: Performed by: HOSPITALIST

## 2023-04-07 PROCEDURE — 250N000013 HC RX MED GY IP 250 OP 250 PS 637: Performed by: INTERNAL MEDICINE

## 2023-04-07 PROCEDURE — 120N000001 HC R&B MED SURG/OB

## 2023-04-07 PROCEDURE — 99231 SBSQ HOSP IP/OBS SF/LOW 25: CPT | Performed by: INTERNAL MEDICINE

## 2023-04-07 RX ADMIN — DOCUSATE SODIUM 100 MG: 100 CAPSULE, LIQUID FILLED ORAL at 08:55

## 2023-04-07 RX ADMIN — DOCUSATE SODIUM 100 MG: 100 CAPSULE, LIQUID FILLED ORAL at 19:58

## 2023-04-07 RX ADMIN — MICONAZOLE NITRATE: 2 POWDER TOPICAL at 08:56

## 2023-04-07 RX ADMIN — ENOXAPARIN SODIUM 40 MG: 100 INJECTION SUBCUTANEOUS at 08:55

## 2023-04-07 RX ADMIN — ASPIRIN 81 MG: 81 TABLET, COATED ORAL at 08:55

## 2023-04-07 RX ADMIN — MIRTAZAPINE 45 MG: 30 TABLET, FILM COATED ORAL at 21:40

## 2023-04-07 RX ADMIN — MULTIPLE VITAMINS W/ MINERALS TAB 1 TABLET: TAB at 08:55

## 2023-04-07 RX ADMIN — MICONAZOLE NITRATE: 2 POWDER TOPICAL at 20:01

## 2023-04-07 RX ADMIN — Medication 100 MG: at 08:55

## 2023-04-07 RX ADMIN — AMOXICILLIN AND CLAVULANATE POTASSIUM 1 TABLET: 875; 125 TABLET, FILM COATED ORAL at 19:58

## 2023-04-07 RX ADMIN — CHOLECALCIFEROL TAB 10 MCG (400 UNIT) 10 MCG: 10 TAB at 08:55

## 2023-04-07 RX ADMIN — ATORVASTATIN CALCIUM 10 MG: 10 TABLET, FILM COATED ORAL at 21:40

## 2023-04-07 RX ADMIN — AMOXICILLIN AND CLAVULANATE POTASSIUM 1 TABLET: 875; 125 TABLET, FILM COATED ORAL at 08:55

## 2023-04-07 ASSESSMENT — ACTIVITIES OF DAILY LIVING (ADL)
ADLS_ACUITY_SCORE: 54

## 2023-04-07 NOTE — PLAN OF CARE
Goal Outcome Evaluation:      Problem: Plan of Care - These are the overarching goals to be used throughout the patient stay.    Goal: Plan of Care Review  Description: The Plan of Care Review/Shift note should be completed every shift.  The Outcome Evaluation is a brief statement about your assessment that the patient is improving, declining, or no change.  This information will be displayed automatically on your shift note.  Outcome: Progressing     Problem: Swallowing Impairment  Goal: Optimal Eating and Swallowing Without Aspiration  Outcome: Progressing     Patient denies pain.   Turned in bed every 2 hours to maintain skin integrity.  Pt tolerating diet and think liquids without straws.   Vitals stable.   A & O.

## 2023-04-07 NOTE — PROGRESS NOTES
Hospitalist Progress Note    Assessment/Plan  68-year-old male with medical history significant for hyperlipidemia, hypertension, mood disorder and a documented previous CVA, brought in by the [facility] officials as a vulnerable adult in need of placement    Possible pulmonary abscess vs malignancy  -continue pip/tazo, transition to augmentin to complete 14 days abx (3/25-4/8)  -Patient with follow-up outpatient pulmonology for repeat CT chest in 6 weeks     Aspiration  -modified diet per speech (thin liquids per patient request)     Hepatitis C  -outpatient hepatology referral     Decubitus ulcers  -wound care     Candida skin infection of bilateral groin, scrotum  -miconazole powder      Disposition: Pending progress.  DVT Prophylaxis: SCDs      Subjective  I have seen and examined this patient today (4/7/2023) and there are no changes to my notes/exam/care plan of yesterday (4/6/2023).  Denies any new complaints.  No acute events overnight.  Awaiting safe discharge plans.    Objective    Vital signs in last 24 hours  Temp:  [97.2  F (36.2  C)-97.8  F (36.6  C)] 97.8  F (36.6  C)  Pulse:  [65-70] 65  Resp:  [16] 16  BP: (106-123)/(70-85) 121/80  SpO2:  [93 %-96 %] 93 % @LASTSAO2(12)@ O2 Device: None (Room air)    Weight:   Wt Readings from Last 3 Encounters:   04/07/23 75.5 kg (166 lb 7.2 oz)      Weight change: -1.1 kg (-2 lb 6.8 oz)    Intake/Output last 3 shifts  I/O last 3 completed shifts:  In: 600 [P.O.:600]  Out: 650 [Urine:650]  Body mass index is 23.88 kg/m .    Physical Exam    General Appearance:    Alert, cooperative, no distress, appears stated age   Lungs:     Clear bilaterally    Cardiovascular:    Regular rate ands rhythm.  Normal S1, S2.  No murmur, rub or gallop.  No edema   Abdomen:     Soft, non-tender, bowel sounds active all four quadrants,     no masses, no organomegaly   Neurologic:   Awake, alert, oriented x 3.  Grossly nonfocal      Pertinent Labs   Lab Results: personally  reviewed.   No results for input(s): NA, CO2, BUN, CREATININE, ALBUMIN, BILITOT, ALKPHOS, ALT, AST, GLUCOSE in the last 168 hours.    Invalid input(s): K, CL, CALCIUM, LABALBU, PROT, MG  No results for input(s): WBC, HGB, HCT, PLT in the last 168 hours.    Invalid input(s): NEUTOPHILPCT, MONOPCT,  EOSPCT  No results for input(s): CKTOTAL, TROPONINI in the last 168 hours.    Invalid input(s): TROPONINT, CKMBINDEX  Invalid input(s): POCGLUFGR    Medications  Current Facility-Administered Medications   Medication     acetaminophen (TYLENOL) tablet 650 mg     albuterol (PROVENTIL) neb solution 2.5 mg     amoxicillin-clavulanate (AUGMENTIN) 875-125 MG per tablet 1 tablet     aspirin EC tablet 81 mg     atorvastatin (LIPITOR) tablet 10 mg     cholecalciferol (VITAMIN D3) 10 mcg (400 units) tablet 10 mcg     docusate sodium (COLACE) capsule 100 mg     enoxaparin ANTICOAGULANT (LOVENOX) injection 40 mg     lidocaine (LMX4) cream     lidocaine 1 % 0.1-1 mL     melatonin tablet 1 mg     miconazole (MICATIN) 2 % powder     mirtazapine (REMERON) tablet 45 mg     multivitamin w/minerals (THERA-VIT-M) tablet 1 tablet     ondansetron (ZOFRAN ODT) ODT tab 4 mg    Or     ondansetron (ZOFRAN) injection 4 mg     senna-docusate (SENOKOT-S/PERICOLACE) 8.6-50 MG per tablet 1 tablet    Or     senna-docusate (SENOKOT-S/PERICOLACE) 8.6-50 MG per tablet 2 tablet     sodium chloride (PF) 0.9% PF flush 3 mL     sodium chloride (PF) 0.9% PF flush 3 mL     thiamine (B-1) tablet 100 mg       Pertinent Radiology   Radiology Results: Personally reviewed   Results for orders placed or performed during the hospital encounter of 03/20/23   Head CT w/o contrast    Impression    IMPRESSION:  1.  No CT evidence for acute intracranial process.  2.  Brain atrophy and presumed chronic microvascular ischemic changes as above.       I spent a total of 33 minutes for this encounter with interval history, physical exam, in patient medication review and  reconciliation.      Maddy Angeles DO  Internal Medicine Hospitalist  3/22/2023

## 2023-04-07 NOTE — PLAN OF CARE
Problem: Plan of Care - These are the overarching goals to be used throughout the patient stay.    Goal: Optimal Comfort and Wellbeing  Outcome: Progressing  Intervention: Monitor Pain and Promote Comfort   Goal Outcome Evaluation:  Pt pleasant and cooperative throughout this shift.  He is awaiting placement at this time.  No current changes to his plan.  Takes pills whole with applesauce.  Continue to monitor.

## 2023-04-07 NOTE — PROGRESS NOTES
CLINICAL NUTRITION SERVICES     Reviewed nutrition risk factors due to LOS. Pt is tolerating diet, eating well per nursing documentation. No nutrition issues identified at this time. RD will follow peripherally at this time, unless consulted.    Heather Arizmendi RDN, LD

## 2023-04-08 PROCEDURE — 120N000001 HC R&B MED SURG/OB

## 2023-04-08 PROCEDURE — 250N000011 HC RX IP 250 OP 636: Performed by: HOSPITALIST

## 2023-04-08 PROCEDURE — 250N000013 HC RX MED GY IP 250 OP 250 PS 637: Performed by: INTERNAL MEDICINE

## 2023-04-08 PROCEDURE — 250N000013 HC RX MED GY IP 250 OP 250 PS 637: Performed by: HOSPITALIST

## 2023-04-08 PROCEDURE — 99231 SBSQ HOSP IP/OBS SF/LOW 25: CPT | Performed by: INTERNAL MEDICINE

## 2023-04-08 RX ADMIN — AMOXICILLIN AND CLAVULANATE POTASSIUM 1 TABLET: 875; 125 TABLET, FILM COATED ORAL at 08:53

## 2023-04-08 RX ADMIN — ATORVASTATIN CALCIUM 10 MG: 10 TABLET, FILM COATED ORAL at 22:17

## 2023-04-08 RX ADMIN — AMOXICILLIN AND CLAVULANATE POTASSIUM 1 TABLET: 875; 125 TABLET, FILM COATED ORAL at 20:21

## 2023-04-08 RX ADMIN — MICONAZOLE NITRATE: 2 POWDER TOPICAL at 20:21

## 2023-04-08 RX ADMIN — ENOXAPARIN SODIUM 40 MG: 100 INJECTION SUBCUTANEOUS at 08:53

## 2023-04-08 RX ADMIN — MULTIPLE VITAMINS W/ MINERALS TAB 1 TABLET: TAB at 08:53

## 2023-04-08 RX ADMIN — DOCUSATE SODIUM 100 MG: 100 CAPSULE, LIQUID FILLED ORAL at 20:21

## 2023-04-08 RX ADMIN — CHOLECALCIFEROL TAB 10 MCG (400 UNIT) 10 MCG: 10 TAB at 08:53

## 2023-04-08 RX ADMIN — MIRTAZAPINE 45 MG: 30 TABLET, FILM COATED ORAL at 22:17

## 2023-04-08 RX ADMIN — Medication 100 MG: at 08:53

## 2023-04-08 RX ADMIN — ASPIRIN 81 MG: 81 TABLET, COATED ORAL at 08:53

## 2023-04-08 RX ADMIN — DOCUSATE SODIUM 100 MG: 100 CAPSULE, LIQUID FILLED ORAL at 08:53

## 2023-04-08 RX ADMIN — MICONAZOLE NITRATE: 2 POWDER TOPICAL at 08:53

## 2023-04-08 ASSESSMENT — ACTIVITIES OF DAILY LIVING (ADL)
ADLS_ACUITY_SCORE: 58
ADLS_ACUITY_SCORE: 58
ADLS_ACUITY_SCORE: 54
ADLS_ACUITY_SCORE: 58
ADLS_ACUITY_SCORE: 54
ADLS_ACUITY_SCORE: 58
ADLS_ACUITY_SCORE: 58

## 2023-04-08 NOTE — PLAN OF CARE
Goal Outcome Evaluation:  VSS and afebrile. Denies pain, no non-verbal signs of pain noted. Accepting of Q2 turns. Lung sounds coarse. Easy to chew diet with thin liquids. Awaiting placement.

## 2023-04-08 NOTE — PROGRESS NOTES
Hospitalist Progress Note    Assessment/Plan  68-year-old male with medical history significant for hyperlipidemia, hypertension, mood disorder and a documented previous CVA, brought in by the [facility] officials as a vulnerable adult in need of placement    Possible pulmonary abscess vs malignancy  -continue pip/tazo, transition to augmentin to complete 14 days abx (3/25-4/8)  -Patient with follow-up outpatient pulmonology for repeat CT chest in 6 weeks     Aspiration  -modified diet per speech (thin liquids per patient request)     Hepatitis C  -outpatient hepatology referral     Decubitus ulcers  -wound care     Candida skin infection of bilateral groin, scrotum  -miconazole powder      Disposition: Pending progress.  DVT Prophylaxis: SCDs      Subjective  I have seen and examined this patient today (4/8/2023) and there are no changes to my notes/exam/care plan of yesterday (4/7/2023).  Continues to await safe discharge plans.  Otherwise no new issues.    Objective    Vital signs in last 24 hours  Temp:  [97.6  F (36.4  C)-98  F (36.7  C)] 97.6  F (36.4  C)  Pulse:  [69-72] 69  Resp:  [22] 22  BP: ()/(63-75) 121/75  SpO2:  [92 %-94 %] 92 % @LASTSAO2(12)@ O2 Device: None (Room air)    Weight:   Wt Readings from Last 3 Encounters:   04/07/23 75.5 kg (166 lb 7.2 oz)      Weight change:     Intake/Output last 3 shifts  I/O last 3 completed shifts:  In: 520 [P.O.:520]  Out: 950 [Urine:950]  Body mass index is 23.88 kg/m .    Physical Exam    General Appearance:    Alert, cooperative, no distress, appears stated age   Lungs:     Clear bilaterally    Cardiovascular:    Regular rate ands rhythm.  Normal S1, S2.  No murmur, rub or gallop.  No edema   Abdomen:     Soft, non-tender, bowel sounds active all four quadrants,     no masses, no organomegaly   Neurologic:   Awake, alert, oriented x 3.  Grossly nonfocal        Medications  Current Facility-Administered Medications   Medication     acetaminophen  You are due for labs every 6 months. Please complete labs as soon as possible. You are due for your diabetic eye exam in August, 2020. You need 3 eight ounce servings of calcium/vitamin D daily.  This includes spinach, kale, broccoli, almonds, milk, (TYLENOL) tablet 650 mg     albuterol (PROVENTIL) neb solution 2.5 mg     amoxicillin-clavulanate (AUGMENTIN) 875-125 MG per tablet 1 tablet     aspirin EC tablet 81 mg     atorvastatin (LIPITOR) tablet 10 mg     cholecalciferol (VITAMIN D3) 10 mcg (400 units) tablet 10 mcg     docusate sodium (COLACE) capsule 100 mg     enoxaparin ANTICOAGULANT (LOVENOX) injection 40 mg     lidocaine (LMX4) cream     lidocaine 1 % 0.1-1 mL     melatonin tablet 1 mg     miconazole (MICATIN) 2 % powder     mirtazapine (REMERON) tablet 45 mg     multivitamin w/minerals (THERA-VIT-M) tablet 1 tablet     ondansetron (ZOFRAN ODT) ODT tab 4 mg    Or     ondansetron (ZOFRAN) injection 4 mg     senna-docusate (SENOKOT-S/PERICOLACE) 8.6-50 MG per tablet 1 tablet    Or     senna-docusate (SENOKOT-S/PERICOLACE) 8.6-50 MG per tablet 2 tablet     sodium chloride (PF) 0.9% PF flush 3 mL     sodium chloride (PF) 0.9% PF flush 3 mL     thiamine (B-1) tablet 100 mg       Pertinent Radiology   Radiology Results: Personally reviewed   Results for orders placed or performed during the hospital encounter of 03/20/23   Head CT w/o contrast    Impression    IMPRESSION:  1.  No CT evidence for acute intracranial process.  2.  Brain atrophy and presumed chronic microvascular ischemic changes as above.       I spent a total of 33 minutes for this encounter with interval history, physical exam, in patient medication review and reconciliation.      Maddy Angeles DO  Internal Medicine Hospitalist  3/22/2023

## 2023-04-08 NOTE — PLAN OF CARE
Problem: Plan of Care - These are the overarching goals to be used throughout the patient stay.    Goal: Optimal Comfort and Wellbeing  Outcome: Progressing   Goal Outcome Evaluation:  Pt pleasant and cooperative throughout this shift.  Large BM this afternoon.  Awaiting placement.

## 2023-04-09 PROCEDURE — 250N000013 HC RX MED GY IP 250 OP 250 PS 637: Performed by: HOSPITALIST

## 2023-04-09 PROCEDURE — 250N000011 HC RX IP 250 OP 636: Performed by: HOSPITALIST

## 2023-04-09 PROCEDURE — 120N000001 HC R&B MED SURG/OB

## 2023-04-09 PROCEDURE — 250N000013 HC RX MED GY IP 250 OP 250 PS 637: Performed by: INTERNAL MEDICINE

## 2023-04-09 RX ADMIN — AMOXICILLIN AND CLAVULANATE POTASSIUM 1 TABLET: 875; 125 TABLET, FILM COATED ORAL at 22:14

## 2023-04-09 RX ADMIN — DOCUSATE SODIUM 100 MG: 100 CAPSULE, LIQUID FILLED ORAL at 10:01

## 2023-04-09 RX ADMIN — ATORVASTATIN CALCIUM 10 MG: 10 TABLET, FILM COATED ORAL at 22:14

## 2023-04-09 RX ADMIN — MULTIPLE VITAMINS W/ MINERALS TAB 1 TABLET: TAB at 10:01

## 2023-04-09 RX ADMIN — DOCUSATE SODIUM 100 MG: 100 CAPSULE, LIQUID FILLED ORAL at 22:14

## 2023-04-09 RX ADMIN — MICONAZOLE NITRATE: 2 POWDER TOPICAL at 22:15

## 2023-04-09 RX ADMIN — ENOXAPARIN SODIUM 40 MG: 100 INJECTION SUBCUTANEOUS at 10:01

## 2023-04-09 RX ADMIN — MICONAZOLE NITRATE: 2 POWDER TOPICAL at 10:01

## 2023-04-09 RX ADMIN — CHOLECALCIFEROL TAB 10 MCG (400 UNIT) 10 MCG: 10 TAB at 10:01

## 2023-04-09 RX ADMIN — ASPIRIN 81 MG: 81 TABLET, COATED ORAL at 10:01

## 2023-04-09 RX ADMIN — Medication 100 MG: at 10:01

## 2023-04-09 RX ADMIN — MIRTAZAPINE 45 MG: 30 TABLET, FILM COATED ORAL at 22:14

## 2023-04-09 RX ADMIN — AMOXICILLIN AND CLAVULANATE POTASSIUM 1 TABLET: 875; 125 TABLET, FILM COATED ORAL at 10:01

## 2023-04-09 ASSESSMENT — ACTIVITIES OF DAILY LIVING (ADL)
ADLS_ACUITY_SCORE: 54

## 2023-04-09 NOTE — PLAN OF CARE
Problem: Plan of Care - These are the overarching goals to be used throughout the patient stay.    Goal: Optimal Comfort and Wellbeing  Outcome: Progressing   Goal Outcome Evaluation:  VSS and afebrile. No complaints of pain and no non-verbal signs of pain. Taking medications whole with water for me this shift. IV is saline locked. Awaiting placement.

## 2023-04-09 NOTE — PLAN OF CARE
Problem: Plan of Care - These are the overarching goals to be used throughout the patient stay.    Goal: Optimal Comfort and Wellbeing  Outcome: Progressing   Goal Outcome Evaluation:  Pt pleasant and cooperative throughout this shift.  Medically ready for discharge, awaiting LTC placement.  No new complaints.

## 2023-04-09 NOTE — PROGRESS NOTES
Hospitalist Progress Note    Assessment/Plan  68-year-old male with medical history significant for hyperlipidemia, hypertension, mood disorder and a documented previous CVA, brought in by the [facility] officials as a vulnerable adult in need of placement    Possible pulmonary abscess vs malignancy  -continue pip/tazo, transition to augmentin to complete 14 days abx (3/25-4/8)  -Patient with follow-up outpatient pulmonology for repeat CT chest in 6 weeks     Aspiration  -modified diet per speech (thin liquids per patient request)     Hepatitis C  -outpatient hepatology referral     Decubitus ulcers  -wound care     Candida skin infection of bilateral groin, scrotum  -miconazole powder      Disposition: Pending progress.  DVT Prophylaxis: SCDs      Subjective  I have seen and examined this patient today (4/9/2023) and there are no changes to my notes/exam/care plan of yesterday (4/8/2023).  Continues to await safe discharge plans.  Otherwise no new issues.    Objective    Vital signs in last 24 hours  Temp:  [97.7  F (36.5  C)-98.2  F (36.8  C)] 97.7  F (36.5  C)  Pulse:  [65-72] 65  Resp:  [20] 20  BP: (108-116)/(70-83) 116/83  SpO2:  [92 %-93 %] 93 % @LASTSAO2(12)@ O2 Device: None (Room air)    Weight:   Wt Readings from Last 3 Encounters:   04/07/23 75.5 kg (166 lb 7.2 oz)      Weight change:     Intake/Output last 3 shifts  I/O last 3 completed shifts:  In: 960 [P.O.:960]  Out: 275 [Urine:275]  Body mass index is 23.88 kg/m .    Physical Exam    General Appearance:    Alert, cooperative, no distress, appears stated age   Lungs:     Clear bilaterally    Cardiovascular:    Regular rate ands rhythm.  Normal S1, S2.  No murmur, rub or gallop.  No edema   Abdomen:     Soft, non-tender, bowel sounds active all four quadrants,     no masses, no organomegaly   Neurologic:   Awake, alert, oriented x 3.  Grossly nonfocal        Medications  Current Facility-Administered Medications   Medication     acetaminophen  (TYLENOL) tablet 650 mg     albuterol (PROVENTIL) neb solution 2.5 mg     amoxicillin-clavulanate (AUGMENTIN) 875-125 MG per tablet 1 tablet     aspirin EC tablet 81 mg     atorvastatin (LIPITOR) tablet 10 mg     cholecalciferol (VITAMIN D3) 10 mcg (400 units) tablet 10 mcg     docusate sodium (COLACE) capsule 100 mg     enoxaparin ANTICOAGULANT (LOVENOX) injection 40 mg     lidocaine (LMX4) cream     lidocaine 1 % 0.1-1 mL     melatonin tablet 1 mg     miconazole (MICATIN) 2 % powder     mirtazapine (REMERON) tablet 45 mg     multivitamin w/minerals (THERA-VIT-M) tablet 1 tablet     ondansetron (ZOFRAN ODT) ODT tab 4 mg    Or     ondansetron (ZOFRAN) injection 4 mg     senna-docusate (SENOKOT-S/PERICOLACE) 8.6-50 MG per tablet 1 tablet    Or     senna-docusate (SENOKOT-S/PERICOLACE) 8.6-50 MG per tablet 2 tablet     sodium chloride (PF) 0.9% PF flush 3 mL     sodium chloride (PF) 0.9% PF flush 3 mL     thiamine (B-1) tablet 100 mg       Pertinent Radiology   Radiology Results: Personally reviewed   Results for orders placed or performed during the hospital encounter of 03/20/23   Head CT w/o contrast    Impression    IMPRESSION:  1.  No CT evidence for acute intracranial process.  2.  Brain atrophy and presumed chronic microvascular ischemic changes as above.       I spent a total of 33 minutes for this encounter with interval history, physical exam, in patient medication review and reconciliation.      Maddy Angeles DO  Internal Medicine Hospitalist  3/22/2023

## 2023-04-10 PROCEDURE — 99231 SBSQ HOSP IP/OBS SF/LOW 25: CPT | Performed by: INTERNAL MEDICINE

## 2023-04-10 PROCEDURE — 120N000001 HC R&B MED SURG/OB

## 2023-04-10 PROCEDURE — 250N000013 HC RX MED GY IP 250 OP 250 PS 637: Performed by: HOSPITALIST

## 2023-04-10 PROCEDURE — 250N000011 HC RX IP 250 OP 636: Performed by: HOSPITALIST

## 2023-04-10 PROCEDURE — 250N000013 HC RX MED GY IP 250 OP 250 PS 637: Performed by: INTERNAL MEDICINE

## 2023-04-10 RX ADMIN — DOCUSATE SODIUM 100 MG: 100 CAPSULE, LIQUID FILLED ORAL at 08:23

## 2023-04-10 RX ADMIN — MICONAZOLE NITRATE: 2 POWDER TOPICAL at 08:23

## 2023-04-10 RX ADMIN — DOCUSATE SODIUM 100 MG: 100 CAPSULE, LIQUID FILLED ORAL at 20:13

## 2023-04-10 RX ADMIN — ASPIRIN 81 MG: 81 TABLET, COATED ORAL at 08:23

## 2023-04-10 RX ADMIN — MICONAZOLE NITRATE: 2 POWDER TOPICAL at 22:23

## 2023-04-10 RX ADMIN — MIRTAZAPINE 45 MG: 30 TABLET, FILM COATED ORAL at 22:28

## 2023-04-10 RX ADMIN — MULTIPLE VITAMINS W/ MINERALS TAB 1 TABLET: TAB at 08:23

## 2023-04-10 RX ADMIN — ATORVASTATIN CALCIUM 10 MG: 10 TABLET, FILM COATED ORAL at 22:26

## 2023-04-10 RX ADMIN — Medication 100 MG: at 08:23

## 2023-04-10 RX ADMIN — ENOXAPARIN SODIUM 40 MG: 100 INJECTION SUBCUTANEOUS at 08:23

## 2023-04-10 RX ADMIN — CHOLECALCIFEROL TAB 10 MCG (400 UNIT) 10 MCG: 10 TAB at 08:23

## 2023-04-10 RX ADMIN — AMOXICILLIN AND CLAVULANATE POTASSIUM 1 TABLET: 875; 125 TABLET, FILM COATED ORAL at 20:13

## 2023-04-10 RX ADMIN — AMOXICILLIN AND CLAVULANATE POTASSIUM 1 TABLET: 875; 125 TABLET, FILM COATED ORAL at 08:23

## 2023-04-10 ASSESSMENT — ACTIVITIES OF DAILY LIVING (ADL)
ADLS_ACUITY_SCORE: 54

## 2023-04-10 NOTE — PROGRESS NOTES
Care Management Follow Up    Length of Stay (days): 13    Expected Discharge Date: 04/11/2023     Concerns to be Addressed: discharge planning       Patient plan of care discussed at interdisciplinary rounds: Yes    Anticipated Discharge Disposition:  long term care  Disposition Comments: long term care TBD    Anticipated Discharge Services: Transportation Services    Anticipated Discharge DME: None      Education Provided on the Discharge Plan: Yes (AVS per bedside RN)    Patient/Family in Agreement with the Plan: yes    Referrals Placed by CM/SW: long term care        Additional Information:  CM reviewed chart. CYNDI spoke to [facility] liaison with Andrew. Facility would need MA application and 3 months of bank statements. CM contacted the financial counselor to see if there's an update on the MA application. CM was told that per a previous CM note that Gerry Salinas [facility]  ([phone]) is working on this. CM left  for Gerry Salinas to confirm that he is working on the MA application and other financial documents. CM will continue to follow.      Zena Merlos RN            .

## 2023-04-10 NOTE — PLAN OF CARE
Problem: Plan of Care - These are the overarching goals to be used throughout the patient stay.    Goal: Optimal Comfort and Wellbeing  Outcome: Progressing   Goal Outcome Evaluation:    Patient is alert and oriented x3, disoriented to situation. Flat affect. IV Access lost this morning, paged provider to see if patient is OK for no IV since there are no IV meds, was told one is necessary while still in hospital. 2 attempts by RNGUNNAR called. Awaiting MA approval, then discharge to TCU. Call light within reach. Alarms on.

## 2023-04-10 NOTE — PLAN OF CARE
Problem: Plan of Care - These are the overarching goals to be used throughout the patient stay.    Goal: Readiness for Transition of Care  Outcome: Progressing     Problem: Plan of Care - These are the overarching goals to be used throughout the patient stay.    Goal: Optimal Comfort and Wellbeing  Outcome: Progressing     Problem: Plan of Care - These are the overarching goals to be used throughout the patient stay.    Goal: Absence of Hospital-Acquired Illness or Injury  Intervention: Prevent Skin Injury  Recent Flowsheet Documentation  Taken 4/10/2023 1704 by Jaquelin Sellers RN  Body Position:    turned    right    log-rolled    weight shifting   Goal Outcome Evaluation:       Pt A&Ox4 and VSS. Pt denies pain, denies N/V, and denies SOB. Flat affect. Pt [place].  Pt Q2 Turns. Call light within reach and bed alarm on.

## 2023-04-10 NOTE — PLAN OF CARE
Problem: Skin Injury Risk Increased  Goal: Skin Health and Integrity  Outcome: Progressing  Intervention: Optimize Skin Protection     Problem: Swallowing Impairment  Goal: Optimal Eating and Swallowing Without Aspiration  Outcome: Progressing   Goal Outcome Evaluation:    Pt comfortable on shift, no complaints or indications of pain.  A&O to self, place, time.  Disoriented to situation.  LS coarse, pt denies cough.  Male pure-wick in place.  Repositioned Q2H as pt allows.  Takes medications whole with applesauce.  Awaiting placement.

## 2023-04-10 NOTE — PROGRESS NOTES
Care Management Follow Up    Length of Stay (days): 13    Expected Discharge Date: 04/11/2023     Concerns to be Addressed: discharge planning     Patient plan of care discussed at interdisciplinary rounds: Yes    Anticipated Discharge Disposition:  (long term care - TBD)  Disposition Comments: long term care TBD  Anticipated Discharge Services: Transportation Services  Anticipated Discharge DME:  (TBD)    Patient/family educated on Medicare website which has current facility and service quality ratings:    Education Provided on the Discharge Plan:    Patient/Family in Agreement with the Plan: yes    Additional Information:  [facility] will consider accepting patient if facility able to get copy of MA application and last 3 bank statements.    Writer also received voice mail from [facility] contact for LTC([phone]) stating patient is service connected for only room/board at [facility] approved facility and only if enrolled Hospice.       Celi Craven CM         room air

## 2023-04-10 NOTE — PLAN OF CARE
Patient has been resting in bed most of the morning. He had a good appetite for breakfast and was able to feed himself after set up. He is able to call appropriately but is [place]. He transfers by pivoting to the [facility] with assist x2, q2 turns. Bed in lowest position, call light within reach, bed alarm armed.       Goal Outcome Evaluation:         Problem: Plan of Care - These are the overarching goals to be used throughout the patient stay.    Goal: Optimal Comfort and Wellbeing  Outcome: Progressing  Goal: Readiness for Transition of Care  Outcome: Progressing     Problem: Skin Injury Risk Increased  Goal: Skin Health and Integrity  Outcome: Progressing     Problem: Swallowing Impairment  Goal: Optimal Eating and Swallowing Without Aspiration  Outcome: Progressing     Problem: Plan of Care - These are the overarching goals to be used throughout the patient stay.    Goal: Absence of Hospital-Acquired Illness or Injury  Intervention: Identify and Manage Fall Risk  Recent Flowsheet Documentation  Taken 4/10/2023 0900 by Cheryl Leong RN  Safety Promotion/Fall Prevention:   room door open   room near nurse's station   safety round/check completed

## 2023-04-10 NOTE — PROGRESS NOTES
Hospitalist Progress Note    Assessment/Plan  68-year-old male with medical history significant for hyperlipidemia, hypertension, mood disorder and a documented previous CVA, brought in by the [facility] officials as a vulnerable adult in need of placement    Possible pulmonary abscess vs malignancy  -continue pip/tazo, transition to augmentin to complete 14 days abx (3/25-4/8)  -Patient with follow-up outpatient pulmonology for repeat CT chest in 6 weeks     Aspiration  -modified diet per speech (thin liquids per patient request)     Hepatitis C  -outpatient hepatology referral     Decubitus ulcers  -wound care     Candida skin infection of bilateral groin, scrotum  -miconazole powder      Disposition: Pending progress.  DVT Prophylaxis: SCDs      Subjective  I have seen and examined this patient today (4/10/2023) and there are no changes to my notes/exam/care plan of yesterday (498/2023).  Continues to await safe discharge plans.  Otherwise no new issues.    Objective    Vital signs in last 24 hours  Temp:  [97.5  F (36.4  C)-97.8  F (36.6  C)] 97.5  F (36.4  C)  Pulse:  [64-67] 65  Resp:  [18-20] 18  BP: (112-125)/(69-84) 125/84  SpO2:  [88 %-95 %] 95 % @LASTSAO2(12)@ O2 Device: None (Room air)    Weight:   Wt Readings from Last 3 Encounters:   04/07/23 75.5 kg (166 lb 7.2 oz)      Weight change:     Intake/Output last 3 shifts  I/O last 3 completed shifts:  In: 1080 [P.O.:1080]  Out: 650 [Urine:650]  Body mass index is 23.88 kg/m .    Physical Exam    General Appearance:    Alert, cooperative, no distress, appears stated age   Lungs:     Clear bilaterally    Cardiovascular:    Regular rate ands rhythm.  Normal S1, S2.  No murmur, rub or gallop.  No edema   Abdomen:     Soft, non-tender, bowel sounds active all four quadrants,     no masses, no organomegaly   Neurologic:   Awake, alert, oriented x 3.  Grossly nonfocal        Medications  Current Facility-Administered Medications   Medication      acetaminophen (TYLENOL) tablet 650 mg     albuterol (PROVENTIL) neb solution 2.5 mg     amoxicillin-clavulanate (AUGMENTIN) 875-125 MG per tablet 1 tablet     aspirin EC tablet 81 mg     atorvastatin (LIPITOR) tablet 10 mg     cholecalciferol (VITAMIN D3) 10 mcg (400 units) tablet 10 mcg     docusate sodium (COLACE) capsule 100 mg     enoxaparin ANTICOAGULANT (LOVENOX) injection 40 mg     lidocaine (LMX4) cream     lidocaine 1 % 0.1-1 mL     melatonin tablet 1 mg     miconazole (MICATIN) 2 % powder     mirtazapine (REMERON) tablet 45 mg     multivitamin w/minerals (THERA-VIT-M) tablet 1 tablet     ondansetron (ZOFRAN ODT) ODT tab 4 mg    Or     ondansetron (ZOFRAN) injection 4 mg     senna-docusate (SENOKOT-S/PERICOLACE) 8.6-50 MG per tablet 1 tablet    Or     senna-docusate (SENOKOT-S/PERICOLACE) 8.6-50 MG per tablet 2 tablet     sodium chloride (PF) 0.9% PF flush 3 mL     sodium chloride (PF) 0.9% PF flush 3 mL     thiamine (B-1) tablet 100 mg       Pertinent Radiology   Radiology Results: Personally reviewed   Results for orders placed or performed during the hospital encounter of 03/20/23   Head CT w/o contrast    Impression    IMPRESSION:  1.  No CT evidence for acute intracranial process.  2.  Brain atrophy and presumed chronic microvascular ischemic changes as above.       I spent a total of 33 minutes for this encounter with interval history, physical exam, in patient medication review and reconciliation.      Maddy Angeles DO  Internal Medicine Hospitalist  3/22/2023

## 2023-04-11 ENCOUNTER — APPOINTMENT (OUTPATIENT)
Dept: OCCUPATIONAL THERAPY | Facility: CLINIC | Age: 68
DRG: 178 | End: 2023-04-11
Attending: INTERNAL MEDICINE
Payer: MEDICARE

## 2023-04-11 LAB
ANION GAP SERPL CALCULATED.3IONS-SCNC: 7 MMOL/L (ref 5–18)
BASOPHILS # BLD AUTO: 0 10E3/UL (ref 0–0.2)
BASOPHILS NFR BLD AUTO: 1 %
BUN SERPL-MCNC: 18 MG/DL (ref 8–22)
CALCIUM SERPL-MCNC: 8.9 MG/DL (ref 8.5–10.5)
CHLORIDE BLD-SCNC: 106 MMOL/L (ref 98–107)
CO2 SERPL-SCNC: 28 MMOL/L (ref 22–31)
CREAT SERPL-MCNC: 0.82 MG/DL (ref 0.7–1.3)
EOSINOPHIL # BLD AUTO: 0.2 10E3/UL (ref 0–0.7)
EOSINOPHIL NFR BLD AUTO: 6 %
ERYTHROCYTE [DISTWIDTH] IN BLOOD BY AUTOMATED COUNT: 14.2 % (ref 10–15)
GFR SERPL CREATININE-BSD FRML MDRD: >90 ML/MIN/1.73M2
GLUCOSE BLD-MCNC: 98 MG/DL (ref 70–125)
HCT VFR BLD AUTO: 42.2 % (ref 40–53)
HGB BLD-MCNC: 13.9 G/DL (ref 13.3–17.7)
IMM GRANULOCYTES # BLD: 0 10E3/UL
IMM GRANULOCYTES NFR BLD: 1 %
LYMPHOCYTES # BLD AUTO: 1.4 10E3/UL (ref 0.8–5.3)
LYMPHOCYTES NFR BLD AUTO: 33 %
MCH RBC QN AUTO: 30.6 PG (ref 26.5–33)
MCHC RBC AUTO-ENTMCNC: 32.9 G/DL (ref 31.5–36.5)
MCV RBC AUTO: 93 FL (ref 78–100)
MONOCYTES # BLD AUTO: 0.5 10E3/UL (ref 0–1.3)
MONOCYTES NFR BLD AUTO: 11 %
NEUTROPHILS # BLD AUTO: 2.1 10E3/UL (ref 1.6–8.3)
NEUTROPHILS NFR BLD AUTO: 48 %
NRBC # BLD AUTO: 0 10E3/UL
NRBC BLD AUTO-RTO: 0 /100
PLATELET # BLD AUTO: 194 10E3/UL (ref 150–450)
POTASSIUM BLD-SCNC: 4.5 MMOL/L (ref 3.5–5)
RBC # BLD AUTO: 4.54 10E6/UL (ref 4.4–5.9)
SODIUM SERPL-SCNC: 141 MMOL/L (ref 136–145)
WBC # BLD AUTO: 4.3 10E3/UL (ref 4–11)

## 2023-04-11 PROCEDURE — 97166 OT EVAL MOD COMPLEX 45 MIN: CPT | Mod: GO

## 2023-04-11 PROCEDURE — 120N000001 HC R&B MED SURG/OB

## 2023-04-11 PROCEDURE — 80048 BASIC METABOLIC PNL TOTAL CA: CPT | Performed by: INTERNAL MEDICINE

## 2023-04-11 PROCEDURE — 97535 SELF CARE MNGMENT TRAINING: CPT | Mod: GO

## 2023-04-11 PROCEDURE — 250N000013 HC RX MED GY IP 250 OP 250 PS 637: Performed by: INTERNAL MEDICINE

## 2023-04-11 PROCEDURE — 250N000013 HC RX MED GY IP 250 OP 250 PS 637: Performed by: HOSPITALIST

## 2023-04-11 PROCEDURE — 85025 COMPLETE CBC W/AUTO DIFF WBC: CPT | Performed by: INTERNAL MEDICINE

## 2023-04-11 PROCEDURE — 99231 SBSQ HOSP IP/OBS SF/LOW 25: CPT | Performed by: INTERNAL MEDICINE

## 2023-04-11 PROCEDURE — 36415 COLL VENOUS BLD VENIPUNCTURE: CPT | Performed by: INTERNAL MEDICINE

## 2023-04-11 PROCEDURE — 250N000011 HC RX IP 250 OP 636: Performed by: HOSPITALIST

## 2023-04-11 RX ADMIN — MULTIPLE VITAMINS W/ MINERALS TAB 1 TABLET: TAB at 08:15

## 2023-04-11 RX ADMIN — MIRTAZAPINE 45 MG: 30 TABLET, FILM COATED ORAL at 21:12

## 2023-04-11 RX ADMIN — ASPIRIN 81 MG: 81 TABLET, COATED ORAL at 08:15

## 2023-04-11 RX ADMIN — CHOLECALCIFEROL TAB 10 MCG (400 UNIT) 10 MCG: 10 TAB at 08:15

## 2023-04-11 RX ADMIN — MICONAZOLE NITRATE: 2 POWDER TOPICAL at 08:28

## 2023-04-11 RX ADMIN — Medication 100 MG: at 08:15

## 2023-04-11 RX ADMIN — AMOXICILLIN AND CLAVULANATE POTASSIUM 1 TABLET: 875; 125 TABLET, FILM COATED ORAL at 21:12

## 2023-04-11 RX ADMIN — DOCUSATE SODIUM 100 MG: 100 CAPSULE, LIQUID FILLED ORAL at 08:15

## 2023-04-11 RX ADMIN — ATORVASTATIN CALCIUM 10 MG: 10 TABLET, FILM COATED ORAL at 21:12

## 2023-04-11 RX ADMIN — ENOXAPARIN SODIUM 40 MG: 100 INJECTION SUBCUTANEOUS at 08:25

## 2023-04-11 RX ADMIN — AMOXICILLIN AND CLAVULANATE POTASSIUM 1 TABLET: 875; 125 TABLET, FILM COATED ORAL at 08:16

## 2023-04-11 RX ADMIN — DOCUSATE SODIUM 100 MG: 100 CAPSULE, LIQUID FILLED ORAL at 21:12

## 2023-04-11 RX ADMIN — MICONAZOLE NITRATE: 2 POWDER TOPICAL at 21:12

## 2023-04-11 ASSESSMENT — ACTIVITIES OF DAILY LIVING (ADL)
ADLS_ACUITY_SCORE: 58
ADLS_ACUITY_SCORE: 54
ADLS_ACUITY_SCORE: 54
ADLS_ACUITY_SCORE: 58
ADLS_ACUITY_SCORE: 54

## 2023-04-11 NOTE — PROGRESS NOTES
Pt is not appropriate for skilled PT services at this time due to plan is to go to LTC .  Will defer to nursing for mobility.  Plan was discussed with Care Manager.  Will D/C current PT orders.  Please reorder if status changes. Thank you.    4/11/2023 by Andrea Beltran, PT

## 2023-04-11 NOTE — PLAN OF CARE
Matheus Nieves is a 68 year old male. DNR DNI. Admitted with abscess in the middle lobe of the right lung, no pneumonia present. Pt. Also experiencing failure to thrive. Pt. Has history of alcohol abuse, HTN, hyperlipidemia, anxiety and depression. Pt. Has glasses in room, but doesn't use them. He has hearing loss, and no hearing aids. He doesn't speak much, when he does speak he has a garbled whisper, can be hard to understand at times. He's an assist of 2, and has been transferring with gait belt and walker from chair/bed/commode. We (student nurse, and RN) have been encouraging oral intake of both food and water, along with the activity of moving from chair to bed and back again. Pt. Seems to prefer the bed, but doesn't complain when moved to chair. I'd recommend continuing to encourage oral intake as well as activity throughout the day.    Student Nurse Christine

## 2023-04-11 NOTE — PROGRESS NOTES
Hospitalist Progress Note    Assessment/Plan  68-year-old male with medical history significant for hyperlipidemia, hypertension, mood disorder and a documented previous CVA, brought in by the [facility] officials as a vulnerable adult in need of placement    Possible pulmonary abscess vs malignancy  -continue pip/tazo, transition to augmentin to complete 14 days abx (3/25-4/8)  -Patient with follow-up outpatient pulmonology for repeat CT chest in 6 weeks     Aspiration  -modified diet per speech (thin liquids per patient request)     Hepatitis C  -outpatient hepatology referral     Decubitus ulcers  -wound care     Candida skin infection of bilateral groin, scrotum  -miconazole powder      Disposition: Pending progress.  DVT Prophylaxis: SCDs      Subjective    Continues to await safe discharge plans.  No new issues.    Objective    Vital signs in last 24 hours  Temp:  [97.7  F (36.5  C)-97.8  F (36.6  C)] 97.7  F (36.5  C)  Pulse:  [62-65] 62  Resp:  [17-19] 19  BP: (103-119)/(66-78) 119/78  SpO2:  [92 %-95 %] 95 % @LASTSAO2(12)@ O2 Device: None (Room air)    Weight:   Wt Readings from Last 3 Encounters:   04/10/23 79.2 kg (174 lb 9.7 oz)      Weight change:     Intake/Output last 3 shifts  I/O last 3 completed shifts:  In: 598 [P.O.:598]  Out: 350 [Urine:350]  Body mass index is 25.05 kg/m .    Physical Exam    General Appearance:    Alert, cooperative, no distress, appears stated age   Lungs:     Clear bilaterally    Cardiovascular:    Regular rate ands rhythm.  Normal S1, S2.  No murmur, rub or gallop.  No edema   Abdomen:     Soft, non-tender, bowel sounds active all four quadrants,     no masses, no organomegaly   Neurologic:   Awake, alert, oriented x 3.  Grossly nonfocal        Medications  Current Facility-Administered Medications   Medication     acetaminophen (TYLENOL) tablet 650 mg     albuterol (PROVENTIL) neb solution 2.5 mg     amoxicillin-clavulanate (AUGMENTIN) 875-125 MG per tablet 1 tablet      aspirin EC tablet 81 mg     atorvastatin (LIPITOR) tablet 10 mg     cholecalciferol (VITAMIN D3) 10 mcg (400 units) tablet 10 mcg     docusate sodium (COLACE) capsule 100 mg     enoxaparin ANTICOAGULANT (LOVENOX) injection 40 mg     lidocaine (LMX4) cream     lidocaine 1 % 0.1-1 mL     melatonin tablet 1 mg     miconazole (MICATIN) 2 % powder     mirtazapine (REMERON) tablet 45 mg     multivitamin w/minerals (THERA-VIT-M) tablet 1 tablet     ondansetron (ZOFRAN ODT) ODT tab 4 mg    Or     ondansetron (ZOFRAN) injection 4 mg     senna-docusate (SENOKOT-S/PERICOLACE) 8.6-50 MG per tablet 1 tablet    Or     senna-docusate (SENOKOT-S/PERICOLACE) 8.6-50 MG per tablet 2 tablet     sodium chloride (PF) 0.9% PF flush 3 mL     sodium chloride (PF) 0.9% PF flush 3 mL     thiamine (B-1) tablet 100 mg       Pertinent Radiology   Radiology Results: Personally reviewed   Results for orders placed or performed during the hospital encounter of 03/20/23   Head CT w/o contrast    Impression    IMPRESSION:  1.  No CT evidence for acute intracranial process.  2.  Brain atrophy and presumed chronic microvascular ischemic changes as above.       I spent a total of 33 minutes for this encounter with interval history, physical exam, in patient medication review and reconciliation.      Maddy Angeles DO  Internal Medicine Hospitalist  3/22/2023

## 2023-04-11 NOTE — PROGRESS NOTES
04/11/23 1300   Appointment Info   Signing Clinician's Name / Credentials (OT) Noelle Cortez, OTR/L   Living Environment   People in Home other (see comments)  ([facility] before [facility]. Currently waiting for LTC placement)   Current Living Arrangements other (see comments)  (currently waiting for LTC placement)   Living Environment Comments [facility] and then with the daughter before hospitaliztion. Currently waiting for LTC placement   Self-Care   Usual Activity Tolerance poor   Current Activity Tolerance poor   Equipment Currently Used at Home wheelchair, manual;walker, rolling   Fall history within last six months no   Activity/Exercise/Self-Care Comment pt was receiving [facility] with all [facility]Ls and [facility]. Pt's daughter reports that he was ind [facility] to the [facility].   General Information   Onset of Illness/Injury or Date of Surgery 03/20/23   Referring Physician Dr. Maddy Angeles   Patient/Family Therapy Goal Statement (OT) not stated by pt   Additional Occupational Profile Info/Pertinent History of Current Problem Adm with volnerable Adult.  PMH:  Hep C, CVA, HTN,Dysphagia, decubitus ulcers, Hypomagnesia. generalized weakness, failure to thrive   Existing Precautions/Restrictions fall   Limitations/Impairments hearing   General Observations and Info Pt is [place] and has declined use of hearing assistance aids   Cognitive Status Examination   Orientation Status orientation to person, place and time   Affect/Mental Status (Cognitive) WFL   Follows Commands WFL   Visual Perception   Visual Impairment/Limitations corrective lenses for reading   Sensory   Sensory Quick Adds sensation intact   Sensory Comments pt reports somen/t in B hands   Pain Assessment   Patient Currently in Pain No   Posture   Posture forward head position   Range of Motion Comprehensive   General Range of Motion upper extremity range of motion deficits identified    Strength Comprehensive (MMT)   General Manual Muscle Testing (MMT) Assessment other (see comments)   Comment, General Manual Muscle Testing (MMT) Assessment generalized weakness   Muscle Tone Assessment   Muscle Tone Quick Adds No deficits were identified   Coordination   Upper Extremity Coordination No deficits were identified   Bed Mobility   Bed Mobility supine-sit;sit-supine;scooting/bridging   Scooting/Bridging [place] (Bed Mobility) supervision   Supine-Sit [place] (Bed Mobility) set up   Sit-Supine [place] (Bed Mobility) set up   Assistive Device (Bed Mobility) bed rails   Transfers   Transfers bed-chair transfer;sit-stand transfer   Transfer Skill: Bed to Chair/Chair to Bed   Bed-Chair [place] (Transfers) minimum assist (75% patient effort)   Sit-Stand Transfer   Sit-Stand [place] (Transfers) minimum assist (75% patient effort)   Balance   Balance Assessment standing balance: static   Activities of Daily Living   BADL Assessment/Intervention lower body dressing   Lower Body Dressing Assessment/Training   [place] Level (Lower Body Dressing) moderate assist (50% patient effort)   Clinical Impression   Criteria for Skilled Therapeutic Interventions Met (OT) Yes, treatment indicated   OT Diagnosis Decreased ADL independence   Influenced by the following impairments Adm with volnerable Adult.  PMH:  Hep C, CVA, HTN,Dysphagia, decubitus ulcers, Hypomagnesia. generalized weakness, failure to thrive   OT Problem List-Impairments impacting ADL problems related to;balance;activity tolerance impaired;cognition;hearing;mobility;range of motion (ROM);strength   Assessment of Occupational Performance 3-5 Performance Deficits   Identified Performance Deficits dressing, toileting, transfers   Planned Therapy Interventions (OT) ADL retraining   Clinical Decision Making Complexity (OT) moderate complexity   Risk & Benefits of therapy have been explained evaluation/treatment results reviewed    OT Total Evaluation Time   OT Eval, Moderate Complexity Minutes (61365) 10   OT Goals   Therapy Frequency (OT) Daily   OT Predicted Duration/Target Date for Goal Attainment 04/18/23   OT Goals Hygiene/Grooming;Lower Body Dressing;Transfers;Toilet Transfer/Toileting   OT: Hygiene/Grooming supervision/stand-by assist;while standing;using adaptive equipment   OT: Lower Body Dressing Supervision/stand-by assist   OT: Transfer Supervision/stand-by assist   OT: Toilet Transfer/Toileting Supervision/stand-by assist   Self-Care/Home Management   Self-Care/Home Mgmt/ADL, Compensatory, Meal Prep Minutes (31589) 17   Symptoms Noted During/After Treatment (Meal Preparation/Planning Training) none   Treatment Detail/Skilled Intervention pt in bed at beginning of session and agreeable to therapy. Pt's daughter was present and helped with answering history questions. Pt required encouragement to participate in activities. Pt SBA for supine<>sit and scoot/bridge bed mob using bed rails. Pt Min A for LB dressing to doff socks and don shorts while sitting EOB and standing with FWW. Dependent for donning socks while sitting EOB, pt declined AE. Min A for STS from EOB with FWW. Min A for bed to chair transfer using FWW. Pt's legs were shaking and he experienced slight LOB which was corrected by therapist. Pt CGA for chair to bed transfer with FWW. Pt declined going into the bathroom for toilet transfers and seated g/h.   OT Discharge Planning   OT Plan 4/18: functional transfers, standing activity tolerance at sink with chair behind, UE exercises/calisthenics   OT Discharge Recommendation (DC Rec) (S)  Transitional Care Facility   OT Rationale for DC Rec Pt is not at ADL baseline and would benefit from continued skilled OT to for safety and to increase activity tolerance, ADL independence and overall strength and enurance.   OT Brief overview of current status CGA-Min A for bed to chair transfer. Min A for LB dressing (dependent to  don socks). Pt requires encouragement to participate in activities.   Total Session Time   Timed Code Treatment Minutes 17   Total Session Time (sum of timed and untimed services) 27

## 2023-04-11 NOTE — PROGRESS NOTES
Care Management Follow Up    Length of Stay (days): 14    Expected Discharge Date: 04/12/2023     Concerns to be Addressed: discharge planning       Patient plan of care discussed at interdisciplinary rounds: Yes    Anticipated Discharge Disposition:  long term care  Disposition Comments: long term care TBD    Anticipated Discharge Services: Transportation Services    Anticipated Discharge DME: None    Education Provided on the Discharge Plan: Yes (AVS per bedside RN)    Patient/Family in Agreement with the Plan: yes    Referrals Placed by CM/SW:  Post acute care        Additional Information:  CM reviewed chart. CM following for discharge needs. CM called Amber (daughter) to get an update regarding the MA application. Amber did not answer. CM left  asking for a return call. CM needs to get a copy of the MA application and 3 months of bank statements. 2:04 PM CM will likely need to arrange for transportation at time of hospital discharge. CM will follow.       Zena Merlos RN

## 2023-04-11 NOTE — PLAN OF CARE
Problem: Plan of Care - These are the overarching goals to be used throughout the patient stay.    Goal: Optimal Comfort and Wellbeing  Outcome: Progressing   Goal Outcome Evaluation:     Pt is alert and oriented , disoriented to situation only. Responds to questions with a nod or shake of his head mostly. Likes to watch TV with a loud sound.   Pressure relief provided. Voiding duran colored urine via primofit. VSS. Awaiting placement.

## 2023-04-11 NOTE — PROGRESS NOTES
04/11/23 1300   Appointment Info   Signing Clinician's Name / Credentials (OT) Noelle Cortez, OTR/L   Living Environment   People in Home other (see comments)  ([facility]. Currently waiting for LTC placement)   Current Living Arrangements other (see comments)  (currently waiting for LTC placement)   Living Environment Comments [facility] and [facility] with [facility] before hospitaliztion. Currently waiting for LTC placement   Self-Care   Usual Activity Tolerance poor   Current Activity Tolerance poor   Equipment Currently Used at Home wheelchair, manual;walker, rolling   Fall history within last six months no   Activity/Exercise/Self-Care Comment pt was [facility]. Pt's daughter reports that he was ind [facility] to [facility].   General Information   Onset of Illness/Injury or Date of Surgery 03/20/23   Referring Physician Dr. Maddy Angeles   Patient/Family Therapy Goal Statement (OT) not stated by pt   Additional Occupational Profile Info/Pertinent History of Current Problem Adm with volnerable Adult.  PMH:  Hep C, CVA, HTN,Dysphagia, decubitus ulcers, Hypomagnesia. generalized weakness, failure to thrive   Existing Precautions/Restrictions fall   Limitations/Impairments hearing   General Observations and Info Pt is [place] and has declined use of hearing assistance aids   Cognitive Status Examination   Orientation Status orientation to person, place and time   Affect/Mental Status (Cognitive) WFL   Follows Commands WFL   Visual Perception   Visual Impairment/Limitations corrective lenses for reading   Sensory   Sensory Quick Adds sensation intact   Sensory Comments pt reports somen/t in B hands   Pain Assessment   Patient Currently in Pain No   Posture   Posture forward head position   Range of Motion Comprehensive   General Range of Motion upper extremity range of motion deficits identified    Strength Comprehensive (MMT)   General Manual Muscle Testing (MMT) Assessment other (see comments)   Comment, General Manual Muscle Testing (MMT) Assessment generalized weakness   Muscle Tone Assessment   Muscle Tone Quick Adds No deficits were identified   Coordination   Upper Extremity Coordination No deficits were identified   Bed Mobility   Bed Mobility supine-sit;sit-supine;scooting/bridging   Scooting/Bridging [place] (Bed Mobility) supervision   Supine-Sit [place] (Bed Mobility) set up   Sit-Supine [place] (Bed Mobility) set up   Assistive Device (Bed Mobility) bed rails   Transfers   Transfers bed-chair transfer;sit-stand transfer   Transfer Skill: Bed to Chair/Chair to Bed   Bed-Chair [place] (Transfers) minimum assist (75% patient effort)   Sit-Stand Transfer   Sit-Stand [place] (Transfers) minimum assist (75% patient effort)   Balance   Balance Assessment standing balance: static   Activities of Daily Living   BADL Assessment/Intervention lower body dressing   Lower Body Dressing Assessment/Training   [place] Level (Lower Body Dressing) moderate assist (50% patient effort)   Clinical Impression   Criteria for Skilled Therapeutic Interventions Met (OT) Yes, treatment indicated   OT Diagnosis Decreased ADL independence   Influenced by the following impairments Adm with volnerable Adult.  PMH:  Hep C, CVA, HTN,Dysphagia, decubitus ulcers, Hypomagnesia. generalized weakness, failure to thrive   OT Problem List-Impairments impacting ADL problems related to;balance;activity tolerance impaired;cognition;hearing;mobility;range of motion (ROM);strength   Assessment of Occupational Performance 3-5 Performance Deficits   Identified Performance Deficits dressing, toileting, transfers   Planned Therapy Interventions (OT) ADL retraining   Clinical Decision Making Complexity (OT) moderate complexity   Risk & Benefits of therapy have been explained evaluation/treatment results reviewed    OT Total Evaluation Time   OT Eval, Moderate Complexity Minutes (06674) 10   OT Goals   Therapy Frequency (OT) Daily   OT Predicted Duration/Target Date for Goal Attainment 04/18/23   OT Goals Hygiene/Grooming;Lower Body Dressing;Transfers;Toilet Transfer/Toileting   OT: Hygiene/Grooming supervision/stand-by assist;while standing;using adaptive equipment   OT: Lower Body Dressing Supervision/stand-by assist   OT: Transfer Supervision/stand-by assist   OT: Toilet Transfer/Toileting Supervision/stand-by assist   Self-Care/Home Management   Self-Care/Home Mgmt/ADL, Compensatory, Meal Prep Minutes (67069) 17   Symptoms Noted During/After Treatment (Meal Preparation/Planning Training) none   Treatment Detail/Skilled Intervention pt in bed at beginning of session and agreeable to therapy. Pt's daughter was present and helped with answering history questions. Pt required encouragement to participate in activities. Pt SBA for supine<>sit and scoot/bridge bed mob using bed rails. Pt Min A for LB dressing to doff socks and don shorts while sitting EOB and standing with FWW. Dependent for donning socks while sitting EOB, pt declined AE. Min A for STS from EOB with FWW. Min A for bed to chair transfer using FWW. Pt's legs were shaking and he experienced slight LOB which was corrected by therapist. Pt CGA for chair to bed transfer with FWW. Pt declined going into the bathroom for toilet transfers and seated g/h.   OT Discharge Planning   OT Plan 4/18: functional transfers, standing activity tolerance at sink with chair behind, UE exercises/calisthenics   OT Discharge Recommendation (DC Rec) (S)  Transitional Care Facility   OT Rationale for DC Rec Pt is not at ADL baseline and would benefit from continued skilled OT to for safety and to increase activity tolerance, ADL independence and overall strength and enurance. Pt would benefit from a long term care placement to continue to receive assistance with ADLs as needed.   OT  Brief overview of current status CGA-Min A for bed to chair transfer. Min A for LB dressing (dependent to don socks). Pt requires encouragement to participate in activities.   Total Session Time   Timed Code Treatment Minutes 17   Total Session Time (sum of timed and untimed services) 27

## 2023-04-11 NOTE — PLAN OF CARE
Goal Outcome Evaluation:    Problem: Plan of Care - These are the overarching goals to be used throughout the patient stay.    Goal: Optimal Comfort and Wellbeing  Outcome: Progressing  This pt is pleasant and cooperative throughout this afternoon.  Awaiting placement at this time.     [id number]

## 2023-04-11 NOTE — PLAN OF CARE
Problem: Plan of Care - These are the overarching goals to be used throughout the patient stay.    Goal: Optimal Comfort and Wellbeing  Outcome: Progressing  Intervention: Monitor Pain and Promote Comfort  Recent Flowsheet Documentation  Taken 4/11/2023 0906 by Radha Jovel RN  Pain Management Interventions:   declines   rest   Goal Outcome Evaluation:    Patient alert and oriented x3, disoriented to situation. Was able to ambulate with patient multiple times to chair. PT/OT referrals placed. Primofit removed this morning. Patient had large BM. IV saline locked. Room air. Awaiting MA approval and placement.

## 2023-04-12 PROCEDURE — 120N000001 HC R&B MED SURG/OB

## 2023-04-12 PROCEDURE — 99231 SBSQ HOSP IP/OBS SF/LOW 25: CPT | Performed by: INTERNAL MEDICINE

## 2023-04-12 PROCEDURE — 250N000013 HC RX MED GY IP 250 OP 250 PS 637: Performed by: INTERNAL MEDICINE

## 2023-04-12 PROCEDURE — 250N000011 HC RX IP 250 OP 636: Performed by: HOSPITALIST

## 2023-04-12 RX ADMIN — CHOLECALCIFEROL TAB 10 MCG (400 UNIT) 10 MCG: 10 TAB at 09:26

## 2023-04-12 RX ADMIN — MICONAZOLE NITRATE: 2 POWDER TOPICAL at 21:15

## 2023-04-12 RX ADMIN — ATORVASTATIN CALCIUM 10 MG: 10 TABLET, FILM COATED ORAL at 21:13

## 2023-04-12 RX ADMIN — DOCUSATE SODIUM 100 MG: 100 CAPSULE, LIQUID FILLED ORAL at 21:13

## 2023-04-12 RX ADMIN — DOCUSATE SODIUM 100 MG: 100 CAPSULE, LIQUID FILLED ORAL at 09:26

## 2023-04-12 RX ADMIN — MICONAZOLE NITRATE: 2 POWDER TOPICAL at 09:26

## 2023-04-12 RX ADMIN — ASPIRIN 81 MG: 81 TABLET, COATED ORAL at 09:26

## 2023-04-12 RX ADMIN — MIRTAZAPINE 45 MG: 30 TABLET, FILM COATED ORAL at 21:13

## 2023-04-12 RX ADMIN — ENOXAPARIN SODIUM 40 MG: 100 INJECTION SUBCUTANEOUS at 09:26

## 2023-04-12 RX ADMIN — Medication 100 MG: at 09:26

## 2023-04-12 RX ADMIN — MULTIPLE VITAMINS W/ MINERALS TAB 1 TABLET: TAB at 09:26

## 2023-04-12 ASSESSMENT — ACTIVITIES OF DAILY LIVING (ADL)
ADLS_ACUITY_SCORE: 58
ADLS_ACUITY_SCORE: 54
ADLS_ACUITY_SCORE: 58
ADLS_ACUITY_SCORE: 58

## 2023-04-12 NOTE — PLAN OF CARE
Problem: Skin Injury Risk Increased  Goal: Skin Health and Integrity  Outcome: Progressing     Problem: Swallowing Impairment  Goal: Optimal Eating and Swallowing Without Aspiration  Outcome: Progressing   Pt resting, calm and withdrawn throughout shift. Q2 turns. Taking medications as prescribed. Awaiting LTC placement.

## 2023-04-12 NOTE — PLAN OF CARE
Problem: Plan of Care - These are the overarching goals to be used throughout the patient stay.    Goal: Optimal Comfort and Wellbeing  Outcome: Progressing     Problem: Swallowing Impairment  Goal: Optimal Eating and Swallowing Without Aspiration  Outcome: Progressing   Goal Outcome Evaluation:    Patient disoriented to time and siutation, VSS, and afebrile. Patient denies pain and has been resting in bed all day. Q2 turns. Urinal within reach and was continent today. EZ chew diet, pills whole in applesauce. MA pending for placement.

## 2023-04-12 NOTE — PROGRESS NOTES
Care Management Follow Up    Length of Stay (days): 15    Expected Discharge Date:  4/17/2023     Concerns to be Addressed: discharge planning       Patient plan of care discussed at interdisciplinary rounds: Yes    Anticipated Discharge Disposition:  long term care  Disposition Comments: long term care TBD    Anticipated Discharge Services: Transportation Services    Anticipated Discharge DME: None     Education Provided on the Discharge Plan: Yes (AVS per bedside RN)    Patient/Family in Agreement with the Plan: yes    Referrals Placed by CM/SW:    [facility] and long term care        Additional Information:  CM reviewed chart. CM following for discharge needs. CM spoke to Amber (daughter). She stated that she received a letter from [facility] and that his case is still pending. CM asked Amber if she would bring in a copy of the MA application and 3 months of bank statements. Amber was agreeable. 3:31 PM Transportation will likely need to be arranged by CM. CM will follow.       Zena Merlos RN

## 2023-04-12 NOTE — PROGRESS NOTES
Hospitalist Progress Note    Assessment/Plan  68-year-old male with medical history significant for hyperlipidemia, hypertension, mood disorder and a documented previous CVA, brought in by the [facility] officials as a vulnerable adult in need of placement    Possible pulmonary abscess vs malignancy  -completed therapy with Augmentin(4/8)  -Patient with follow-up outpatient pulmonology for repeat CT chest in 6 weeks     Aspiration  -modified diet per speech (thin liquids per patient request)     Hepatitis C  -outpatient hepatology referral     Decubitus ulcers  -wound care     Candida skin infection of bilateral groin, scrotum  -miconazole powder      Disposition: Pending progress.  DVT Prophylaxis: SCDs      Subjective  I have seen and examined this patient today (4/12/2023) and there are no changes to my notes/exam/care plan of yesterday (4/11/2023).  Awaiting safe discharge plans.  No new issues. No acute events overnight.    Objective    Vital signs in last 24 hours  Temp:  [97.6  F (36.4  C)-98.4  F (36.9  C)] 98.4  F (36.9  C)  Pulse:  [68-74] 68  Resp:  [16-18] 16  BP: (125-127)/(78-81) 127/78  SpO2:  [93 %-94 %] 94 % @LASTSAO2(12)@ O2 Device: None (Room air)    Weight:   Wt Readings from Last 3 Encounters:   04/12/23 77.2 kg (170 lb 3.1 oz)      Weight change: -2 kg (-4 lb 6.6 oz)    Intake/Output last 3 shifts  I/O last 3 completed shifts:  In: 240 [P.O.:240]  Out: 700 [Urine:700]  Body mass index is 24.42 kg/m .    Physical Exam    General Appearance:    Alert, cooperative, no distress, appears stated age   Lungs:     Clear bilaterally    Cardiovascular:    Regular rate ands rhythm.  Normal S1, S2.  No murmur, rub or gallop.  No edema   Abdomen:     Soft, non-tender, bowel sounds active all four quadrants,     no masses, no organomegaly   Neurologic:   Awake, alert, oriented x 3.  Grossly nonfocal        Medications  Current Facility-Administered Medications   Medication     acetaminophen (TYLENOL)  tablet 650 mg     albuterol (PROVENTIL) neb solution 2.5 mg     amoxicillin-clavulanate (AUGMENTIN) 875-125 MG per tablet 1 tablet     aspirin EC tablet 81 mg     atorvastatin (LIPITOR) tablet 10 mg     cholecalciferol (VITAMIN D3) 10 mcg (400 units) tablet 10 mcg     docusate sodium (COLACE) capsule 100 mg     enoxaparin ANTICOAGULANT (LOVENOX) injection 40 mg     lidocaine (LMX4) cream     lidocaine 1 % 0.1-1 mL     melatonin tablet 1 mg     miconazole (MICATIN) 2 % powder     mirtazapine (REMERON) tablet 45 mg     multivitamin w/minerals (THERA-VIT-M) tablet 1 tablet     ondansetron (ZOFRAN ODT) ODT tab 4 mg    Or     ondansetron (ZOFRAN) injection 4 mg     senna-docusate (SENOKOT-S/PERICOLACE) 8.6-50 MG per tablet 1 tablet    Or     senna-docusate (SENOKOT-S/PERICOLACE) 8.6-50 MG per tablet 2 tablet     sodium chloride (PF) 0.9% PF flush 3 mL     sodium chloride (PF) 0.9% PF flush 3 mL     thiamine (B-1) tablet 100 mg       Pertinent Radiology   Radiology Results: Personally reviewed   Results for orders placed or performed during the hospital encounter of 03/20/23   Head CT w/o contrast    Impression    IMPRESSION:  1.  No CT evidence for acute intracranial process.  2.  Brain atrophy and presumed chronic microvascular ischemic changes as above.       I spent a total of 33 minutes for this encounter with interval history, physical exam, in patient medication review and reconciliation.      Maddy Angeles DO  Internal Medicine Hospitalist  3/22/2023

## 2023-04-13 ENCOUNTER — APPOINTMENT (OUTPATIENT)
Dept: OCCUPATIONAL THERAPY | Facility: CLINIC | Age: 68
DRG: 178 | End: 2023-04-13
Payer: MEDICARE

## 2023-04-13 PROCEDURE — 120N000001 HC R&B MED SURG/OB

## 2023-04-13 PROCEDURE — 97535 SELF CARE MNGMENT TRAINING: CPT | Mod: GO

## 2023-04-13 PROCEDURE — 250N000013 HC RX MED GY IP 250 OP 250 PS 637: Performed by: INTERNAL MEDICINE

## 2023-04-13 PROCEDURE — 250N000011 HC RX IP 250 OP 636: Performed by: HOSPITALIST

## 2023-04-13 PROCEDURE — 99207 PR CDG-CUT & PASTE-POTENTIAL IMPACT ON LEVEL: CPT | Performed by: INTERNAL MEDICINE

## 2023-04-13 PROCEDURE — 99232 SBSQ HOSP IP/OBS MODERATE 35: CPT | Performed by: INTERNAL MEDICINE

## 2023-04-13 RX ADMIN — MICONAZOLE NITRATE: 2 POWDER TOPICAL at 09:30

## 2023-04-13 RX ADMIN — CHOLECALCIFEROL TAB 10 MCG (400 UNIT) 10 MCG: 10 TAB at 09:30

## 2023-04-13 RX ADMIN — DOCUSATE SODIUM 100 MG: 100 CAPSULE, LIQUID FILLED ORAL at 09:30

## 2023-04-13 RX ADMIN — Medication 100 MG: at 09:30

## 2023-04-13 RX ADMIN — ASPIRIN 81 MG: 81 TABLET, COATED ORAL at 09:30

## 2023-04-13 RX ADMIN — DOCUSATE SODIUM 100 MG: 100 CAPSULE, LIQUID FILLED ORAL at 21:17

## 2023-04-13 RX ADMIN — MULTIPLE VITAMINS W/ MINERALS TAB 1 TABLET: TAB at 09:30

## 2023-04-13 RX ADMIN — MIRTAZAPINE 45 MG: 30 TABLET, FILM COATED ORAL at 21:17

## 2023-04-13 RX ADMIN — MICONAZOLE NITRATE: 2 POWDER TOPICAL at 21:21

## 2023-04-13 RX ADMIN — ATORVASTATIN CALCIUM 10 MG: 10 TABLET, FILM COATED ORAL at 21:17

## 2023-04-13 RX ADMIN — ENOXAPARIN SODIUM 40 MG: 100 INJECTION SUBCUTANEOUS at 09:30

## 2023-04-13 ASSESSMENT — ACTIVITIES OF DAILY LIVING (ADL)
ADLS_ACUITY_SCORE: 56
ADLS_ACUITY_SCORE: 54
ADLS_ACUITY_SCORE: 54
ADLS_ACUITY_SCORE: 56
ADLS_ACUITY_SCORE: 54
ADLS_ACUITY_SCORE: 56
ADLS_ACUITY_SCORE: 54
ADLS_ACUITY_SCORE: 54

## 2023-04-13 NOTE — PLAN OF CARE
Problem: Plan of Care - These are the overarching goals to be used throughout the patient stay.    Goal: Readiness for Transition of Care  Outcome: Progressing     A&Ox3, confused at times. Denies pain. Urinal within reach. Takes pills whole with applesauce. Repositioning q2h. Alarms on for safety.

## 2023-04-13 NOTE — PROGRESS NOTES
"Care Management Follow Up    Length of Stay (days): 16    Expected Discharge Date: 04/20/2023     Concerns to be Addressed: discharge planning     Patient plan of care discussed at interdisciplinary rounds: Yes    Anticipated Discharge Disposition:  (long term care)  Disposition Comments: long term care TBD  Anticipated Discharge Services: Transportation Services  Anticipated Discharge DME: None    Education Provided on the Discharge Plan: Yes   Patient/Family in Agreement with the Plan: yes    Referrals Placed by CM/SW:  LTC  Private pay costs discussed: Not applicable    Additional Information:  Chart reviewed.    Social history per CM consult note 3/21/23: Pt was at \"[facility] where he was since June 2022. He was removed AMA from there by his daughter Amber, who then realized she couldn't care for patient at home. Has Adult Protection case open with [facility] handled by Gerry Salinas ([phone]).\"    CYNDI spoke to pt; he does not want to return to [facility].  LTC referrals pending,  [facility] post-acute team continues to assist.  MA application pending.  Case has been escalated to leadership.    CM spoke to daughter Amber ([phone]).  Amber plans to visit patient today and will bring copy of MA application and 3 months of bank statements.  CM to forward these documents to [facility], liaison with [facility].    CM to update Gerry as needed.  Pt not to be discharged to home, unsafe without care.    2:30 PM  Per CM leadership, resent LTC referral to [facility].    Douglas Chua RN      "

## 2023-04-13 NOTE — PROGRESS NOTES
Patient was repositioned q 2 hours was up in his chair and had a shower and went outside with the PCA for a little while. Patient denied any pain or discomfort.

## 2023-04-13 NOTE — PLAN OF CARE
Patient turned and repositioned every 2 hours. Quiet, withdrawn. Mostly only nods head yes/no when asked questions. Needs help ordering food. Incontinent of bowel and sometimes bladder. Await placement.     Problem: Skin Injury Risk Increased  Goal: Skin Health and Integrity  Outcome: Adequate for Care Transition     Problem: Plan of Care - These are the overarching goals to be used throughout the patient stay.    Goal: Readiness for Transition of Care  Outcome: Adequate for Care Transition   Goal Outcome Evaluation:

## 2023-04-13 NOTE — PROGRESS NOTES
Hospitalist Progress Note    Assessment/Plan  68-year-old male with medical history significant for hyperlipidemia, hypertension, mood disorder and a documented previous CVA, brought in by the [facility] officials as a vulnerable adult in need of placement    Possible pulmonary abscess vs malignancy  -completed therapy with Augmentin(4/8)  -Patient with follow-up outpatient pulmonology for repeat CT chest in 6 weeks     Aspiration  -modified diet per speech (thin liquids per patient request)     Hepatitis C  -outpatient hepatology referral     Decubitus ulcers  -wound care     Candida skin infection of bilateral groin, scrotum  -miconazole powder      Disposition: Pending progress.  DVT Prophylaxis: SCDs      Subjective  I have seen and examined this patient today (4/13/2023) and there are no changes to my notes/exam/care plan of yesterday (4/12/2023).  Awaiting safe discharge plans.  No new issues. No acute events overnight.    Objective    Vital signs in last 24 hours  Temp:  [97.6  F (36.4  C)-98.4  F (36.9  C)] 98.2  F (36.8  C)  Pulse:  [63-70] 65  Resp:  [20-24] 24  BP: (106-141)/(72-88) 133/72  Cuff Mean (mmHg):  [98] 98  SpO2:  [92 %-94 %] 93 % @LASTSAO2(12)@ O2 Device: None (Room air)    Weight:   Wt Readings from Last 3 Encounters:   04/12/23 77.2 kg (170 lb 3.1 oz)      Weight change:     Intake/Output last 3 shifts  I/O last 3 completed shifts:  In: 240 [P.O.:240]  Out: 675 [Urine:675]  Body mass index is 24.42 kg/m .    Physical Exam    General Appearance:    Alert, cooperative, no distress, appears stated age   Lungs:     Clear bilaterally    Cardiovascular:    Regular rate ands rhythm.  Normal S1, S2.  No murmur, rub or gallop.  No edema   Abdomen:     Soft, non-tender, bowel sounds active all four quadrants,     no masses, no organomegaly   Neurologic:   Awake, alert, oriented x 3.  Grossly nonfocal        Medications  Current Facility-Administered Medications   Medication     acetaminophen  (TYLENOL) tablet 650 mg     albuterol (PROVENTIL) neb solution 2.5 mg     aspirin EC tablet 81 mg     atorvastatin (LIPITOR) tablet 10 mg     cholecalciferol (VITAMIN D3) 10 mcg (400 units) tablet 10 mcg     docusate sodium (COLACE) capsule 100 mg     enoxaparin ANTICOAGULANT (LOVENOX) injection 40 mg     lidocaine (LMX4) cream     lidocaine 1 % 0.1-1 mL     melatonin tablet 1 mg     miconazole (MICATIN) 2 % powder     mirtazapine (REMERON) tablet 45 mg     multivitamin w/minerals (THERA-VIT-M) tablet 1 tablet     ondansetron (ZOFRAN ODT) ODT tab 4 mg    Or     ondansetron (ZOFRAN) injection 4 mg     senna-docusate (SENOKOT-S/PERICOLACE) 8.6-50 MG per tablet 1 tablet    Or     senna-docusate (SENOKOT-S/PERICOLACE) 8.6-50 MG per tablet 2 tablet     sodium chloride (PF) 0.9% PF flush 3 mL     sodium chloride (PF) 0.9% PF flush 3 mL     thiamine (B-1) tablet 100 mg       Pertinent Radiology   Radiology Results: Personally reviewed   Results for orders placed or performed during the hospital encounter of 03/20/23   Head CT w/o contrast    Impression    IMPRESSION:  1.  No CT evidence for acute intracranial process.  2.  Brain atrophy and presumed chronic microvascular ischemic changes as above.       I spent a total of 33 minutes for this encounter with interval history, physical exam, in patient medication review and reconciliation.      Maddy Angeles DO  Internal Medicine Hospitalist  3/22/2023

## 2023-04-14 PROCEDURE — 250N000013 HC RX MED GY IP 250 OP 250 PS 637: Performed by: INTERNAL MEDICINE

## 2023-04-14 PROCEDURE — 99207 PR CDG-CUT & PASTE-POTENTIAL IMPACT ON LEVEL: CPT | Performed by: INTERNAL MEDICINE

## 2023-04-14 PROCEDURE — 120N000001 HC R&B MED SURG/OB

## 2023-04-14 PROCEDURE — 99232 SBSQ HOSP IP/OBS MODERATE 35: CPT | Performed by: INTERNAL MEDICINE

## 2023-04-14 PROCEDURE — 250N000011 HC RX IP 250 OP 636: Performed by: HOSPITALIST

## 2023-04-14 RX ADMIN — MULTIPLE VITAMINS W/ MINERALS TAB 1 TABLET: TAB at 08:41

## 2023-04-14 RX ADMIN — MIRTAZAPINE 45 MG: 30 TABLET, FILM COATED ORAL at 22:11

## 2023-04-14 RX ADMIN — DOCUSATE SODIUM 100 MG: 100 CAPSULE, LIQUID FILLED ORAL at 20:51

## 2023-04-14 RX ADMIN — CHOLECALCIFEROL TAB 10 MCG (400 UNIT) 10 MCG: 10 TAB at 08:41

## 2023-04-14 RX ADMIN — DOCUSATE SODIUM 100 MG: 100 CAPSULE, LIQUID FILLED ORAL at 08:41

## 2023-04-14 RX ADMIN — MICONAZOLE NITRATE: 2 POWDER TOPICAL at 10:43

## 2023-04-14 RX ADMIN — ASPIRIN 81 MG: 81 TABLET, COATED ORAL at 08:41

## 2023-04-14 RX ADMIN — ATORVASTATIN CALCIUM 10 MG: 10 TABLET, FILM COATED ORAL at 22:11

## 2023-04-14 RX ADMIN — ENOXAPARIN SODIUM 40 MG: 100 INJECTION SUBCUTANEOUS at 08:43

## 2023-04-14 RX ADMIN — Medication 100 MG: at 08:41

## 2023-04-14 RX ADMIN — MICONAZOLE NITRATE: 2 POWDER TOPICAL at 20:51

## 2023-04-14 ASSESSMENT — ACTIVITIES OF DAILY LIVING (ADL)
ADLS_ACUITY_SCORE: 56
ADLS_ACUITY_SCORE: 54
ADLS_ACUITY_SCORE: 54
ADLS_ACUITY_SCORE: 56
ADLS_ACUITY_SCORE: 54
ADLS_ACUITY_SCORE: 56
ADLS_ACUITY_SCORE: 54

## 2023-04-14 NOTE — PROGRESS NOTES
Hospitalist Progress Note    Assessment/Plan  68-year-old male with medical history significant for hyperlipidemia, hypertension, mood disorder and a documented previous CVA, brought in by the [facility] officials as a vulnerable adult in need of placement    Possible pulmonary abscess vs malignancy  -completed therapy with Augmentin(4/8)  -Patient with follow-up outpatient pulmonology for repeat CT chest in 6 weeks     Aspiration  -modified diet per speech (thin liquids per patient request)     Hepatitis C  -outpatient hepatology referral     Decubitus ulcers  -wound care     Candida skin infection of bilateral groin, scrotum  -miconazole powder      Disposition: Pending progress.  DVT Prophylaxis: SCDs      Subjective  I have seen and examined this patient today (4/14/2023) and there are no changes to my notes/exam/care plan of yesterday (4/13/2023).  Awaiting safe discharge plans.  No new issues. No acute events overnight.    Objective    Vital signs in last 24 hours  Temp:  [97.6  F (36.4  C)-98.5  F (36.9  C)] 97.9  F (36.6  C)  Pulse:  [65-79] 79  Resp:  [18-24] 18  BP: (109-133)/(71-80) 122/79  Cuff Mean (mmHg):  [98] 98  SpO2:  [93 %-96 %] 93 % @LASTSAO2(12)@ O2 Device: None (Room air)    Weight:   Wt Readings from Last 3 Encounters:   04/12/23 77.2 kg (170 lb 3.1 oz)      Weight change:     Intake/Output last 3 shifts  I/O last 3 completed shifts:  In: 240 [P.O.:240]  Out: 100 [Urine:100]  Body mass index is 24.42 kg/m .    Physical Exam    General Appearance:    Alert, cooperative, no distress, appears stated age   Lungs:     Clear bilaterally    Cardiovascular:    Regular rate ands rhythm.  Normal S1, S2.  No murmur, rub or gallop.  No edema   Abdomen:     Soft, non-tender, bowel sounds active all four quadrants,     no masses, no organomegaly   Neurologic:   Awake, alert, oriented x 3.  Grossly nonfocal        Medications  Current Facility-Administered Medications   Medication     acetaminophen  (TYLENOL) tablet 650 mg     albuterol (PROVENTIL) neb solution 2.5 mg     aspirin EC tablet 81 mg     atorvastatin (LIPITOR) tablet 10 mg     cholecalciferol (VITAMIN D3) 10 mcg (400 units) tablet 10 mcg     docusate sodium (COLACE) capsule 100 mg     enoxaparin ANTICOAGULANT (LOVENOX) injection 40 mg     lidocaine (LMX4) cream     lidocaine 1 % 0.1-1 mL     melatonin tablet 1 mg     miconazole (MICATIN) 2 % powder     mirtazapine (REMERON) tablet 45 mg     multivitamin w/minerals (THERA-VIT-M) tablet 1 tablet     ondansetron (ZOFRAN ODT) ODT tab 4 mg    Or     ondansetron (ZOFRAN) injection 4 mg     senna-docusate (SENOKOT-S/PERICOLACE) 8.6-50 MG per tablet 1 tablet    Or     senna-docusate (SENOKOT-S/PERICOLACE) 8.6-50 MG per tablet 2 tablet     sodium chloride (PF) 0.9% PF flush 3 mL     sodium chloride (PF) 0.9% PF flush 3 mL     thiamine (B-1) tablet 100 mg       Pertinent Radiology   Radiology Results: Personally reviewed   Results for orders placed or performed during the hospital encounter of 03/20/23   Head CT w/o contrast    Impression    IMPRESSION:  1.  No CT evidence for acute intracranial process.  2.  Brain atrophy and presumed chronic microvascular ischemic changes as above.       I spent a total of 33 minutes for this encounter with interval history, physical exam, in patient medication review and reconciliation.      Maddy Angeles DO  Internal Medicine Hospitalist  3/22/2023

## 2023-04-14 NOTE — PROGRESS NOTES
Care Management Follow Up    Length of Stay (days): 17    Expected Discharge Date: 04/20/2023     Concerns to be Addressed: discharge planning     Patient plan of care discussed at interdisciplinary rounds: Yes    Anticipated Discharge Disposition:  (long term care)  Disposition Comments: long term care TBD  Anticipated Discharge Services: Transportation Services  Anticipated Discharge DME: None    Patient/family educated on Medicare website which has current facility and service quality ratings:  yes  Education Provided on the Discharge Plan: Yes  Patient/Family in Agreement with the Plan: yes    Referrals Placed by CM/SW:  [facility]  Private pay costs discussed: Not applicable    Additional Information:    [facility] - can clinically accept this patient per Tawana in Admissions dept.  Will need signed financial agreement between [facility] and [facility] - forms will be sent to CM Leadership from [facility].    CM met with pt.  Pt agreeable to discharge to [facility].  CM called daughter Amber, no answer.  CM left voicemail; return call pending.    PAS done.  [id number] - does not require an OBRA level II assessment.    MHFV wheelchair secured for Saturday 4/14/23 to [facility], to arrive on the unit between 10:50-11:35am in anticipation.      4:00 PM  Discharge and Transportation cancelled due to still awaiting completion of financial agreement documents via CM Leadership.    CM to update Gerry at [facility] of final discharge plans ([phone]).    Douglas Chua RN

## 2023-04-14 NOTE — PLAN OF CARE
Goal Outcome Evaluation:         Patient denies pain. Quiet, mostly nods to questions. Periarea slightly red more so on left, powder applied. Voiding in urinal. Uneventful shift.

## 2023-04-14 NOTE — PLAN OF CARE
Problem: Plan of Care - These are the overarching goals to be used throughout the patient stay.    Goal: Optimal Comfort and Wellbeing  Outcome: Progressing  Problem: Plan of Care - These are the overarching goals to be used throughout the patient stay.    Goal: Readiness for Transition of Care  Outcome: Progressing   Goal Outcome Evaluation:    Patient disoriented to situation but oriented otherwise. 2 assist out of bed. Denies pain. Perineal rash looking much improved. Using urinal independently. VSS. Alarms on. Call light within reach. Potential discharge to TCU tomorrow or Monday.

## 2023-04-14 NOTE — PLAN OF CARE
Patient quiet and withdrawn. Minimal speech. Using urinal appropriately at this time. Turning and repositioning every two hours, patient able to assist. Await placement.    Problem: Plan of Care - These are the overarching goals to be used throughout the patient stay.    Goal: Optimal Comfort and Wellbeing  Outcome: Adequate for Care Transition     Problem: Swallowing Impairment  Goal: Optimal Eating and Swallowing Without Aspiration  Outcome: Adequate for Care Transition   Goal Outcome Evaluation:

## 2023-04-15 ENCOUNTER — LAB REQUISITION (OUTPATIENT)
Dept: LAB | Facility: CLINIC | Age: 68
End: 2023-04-15
Payer: MEDICARE

## 2023-04-15 VITALS
TEMPERATURE: 97.2 F | SYSTOLIC BLOOD PRESSURE: 132 MMHG | WEIGHT: 172.4 LBS | RESPIRATION RATE: 18 BRPM | HEART RATE: 68 BPM | HEIGHT: 70 IN | OXYGEN SATURATION: 95 % | BODY MASS INDEX: 24.68 KG/M2 | DIASTOLIC BLOOD PRESSURE: 89 MMHG

## 2023-04-15 DIAGNOSIS — U07.1 COVID-19: ICD-10-CM

## 2023-04-15 PROCEDURE — 250N000013 HC RX MED GY IP 250 OP 250 PS 637: Performed by: INTERNAL MEDICINE

## 2023-04-15 PROCEDURE — 99239 HOSP IP/OBS DSCHRG MGMT >30: CPT | Performed by: HOSPITALIST

## 2023-04-15 PROCEDURE — 250N000011 HC RX IP 250 OP 636: Performed by: HOSPITALIST

## 2023-04-15 PROCEDURE — U0003 INFECTIOUS AGENT DETECTION BY NUCLEIC ACID (DNA OR RNA); SEVERE ACUTE RESPIRATORY SYNDROME CORONAVIRUS 2 (SARS-COV-2) (CORONAVIRUS DISEASE [COVID-19]), AMPLIFIED PROBE TECHNIQUE, MAKING USE OF HIGH THROUGHPUT TECHNOLOGIES AS DESCRIBED BY CMS-2020-01-R: HCPCS

## 2023-04-15 RX ADMIN — MICONAZOLE NITRATE: 2 POWDER TOPICAL at 08:18

## 2023-04-15 RX ADMIN — MULTIPLE VITAMINS W/ MINERALS TAB 1 TABLET: TAB at 08:15

## 2023-04-15 RX ADMIN — CHOLECALCIFEROL TAB 10 MCG (400 UNIT) 10 MCG: 10 TAB at 08:20

## 2023-04-15 RX ADMIN — Medication 100 MG: at 08:15

## 2023-04-15 RX ADMIN — DOCUSATE SODIUM 100 MG: 100 CAPSULE, LIQUID FILLED ORAL at 08:15

## 2023-04-15 RX ADMIN — ENOXAPARIN SODIUM 40 MG: 100 INJECTION SUBCUTANEOUS at 08:15

## 2023-04-15 RX ADMIN — ASPIRIN 81 MG: 81 TABLET, COATED ORAL at 08:15

## 2023-04-15 ASSESSMENT — ACTIVITIES OF DAILY LIVING (ADL)
ADLS_ACUITY_SCORE: 54

## 2023-04-15 NOTE — PROGRESS NOTES
Occupational Therapy Discharge Summary    Reason for therapy discharge:    Discharged to transitional care facility.    Progress towards therapy goal(s). See goals on Care Plan in [facility] electronic health record for goal details.  Goals partially met.  Barriers to achieving goals:   discharge from facility.    Therapy recommendation(s):    Continued therapy is recommended.  Rationale/Recommendations:  to increase ADL independence and activity tolerance.

## 2023-04-15 NOTE — PLAN OF CARE
Goal Outcome Evaluation:    Patient cleared for discharge to TCU. IV removed. AVS faxed to facility. Education given. Patient dressed and awaiting pickup.

## 2023-04-15 NOTE — PROGRESS NOTES
Care Management Discharge Note    Discharge Date: 04/15/2023       Discharge Disposition:  (long term care)    Discharge Services: Transportation Services    Discharge DME: None    Discharge Transportation: agency    Private pay costs discussed: Not applicable    PAS Confirmation Code:  ([id number])  Patient/family educated on Medicare website which has current facility and service quality ratings:  yes    Education Provided on the Discharge Plan: Yes  Persons Notified of Discharge Plans: patient and daughter Amber  Patient/Family in Agreement with the Plan: yes    Additional Information:    M[facility] wheelchair secured for today 4/15/23 at 14:05 to [facility] (will arrive between 14:05-14:50).  Update given to patient, HUC, charge RN, and Christine at [facility].  Discharge orders faxed in.  PAS done yesterday.      Gerry at [facility] ([phone]) - left a voicemail to Gerry regarding final discharge plans.     12:55 PM  Update given to daughter Amber via phone call.  [facility]' address and phone number provided to Amber.  No further questions from Amber.     No further CM needs identified.    Douglas Chua RN

## 2023-04-15 NOTE — DISCHARGE SUMMARY
"[facility] MEDICINE  DISCHARGE SUMMARY     Primary Care Physician: [facility]  Admission Date: 3/20/2023   Discharge Provider: Nano Moreno MD Discharge Date: 4/15/2023   Diet:    Code Status: No CPR- Do NOT Intubate   Activity: DCACTIVITY: Activity as tolerated        Condition at Discharge: Stable     REASON FOR PRESENTATION(See Admission Note for Details)   Vulnerable adult     PRINCIPAL & ACTIVE DISCHARGE DIAGNOSES     Principal Problem:    Abscess of middle lobe of right lung without pneumonia (H)  Active Problems:    Aspiration into airway    Generalized muscle weakness    Failure to thrive in adult    Pressure injury of skin of sacral region, unspecified injury stage    Hepatitis C    Candida infection      PENDING LABS     Unresulted Labs Ordered in the Past 30 Days of this Admission     No orders found from 2/18/2023 to 3/21/2023.            PROCEDURES ( this hospitalization only)          RECOMMENDATIONS TO OUTPATIENT PROVIDER FOR F/U VISIT     Follow-up Appointments     Follow Up and recommended labs and tests      Follow up with primary care provider in 1 week.    Follow up with at hepatology & pulmonology clinic. Referrals provided.                 DISPOSITION     Skilled Nursing Facility    SUMMARY OF HOSPITAL COURSE:    Matheus Nieves is a 68-year-old male with medical history significant for hyperlipidemia, hypertension, mood disorder and a documented previous CVA, brought in by the [facility] officials as a vulnerable adult in need of placement     Possible pulmonary abscess vs malignancy  - CT chest \" 3.3 cm right middle lobe mass, highly suspicious for malignancy\"  - Completed therapy with Augmentin(4/8)  - Patient with follow-up outpatient pulmonology for repeat CT chest in 6 weeks     Aspiration  - Modified diet per speech (thin liquids per patient request)     Hepatitis C  - Outpatient hepatology " referral     Decubitus ulcers  - Wound care     Candida skin infection of bilateral groin, scrotum  - Continue Miconazole powder     Discharge Medications with Med changes:     Current Discharge Medication List      START taking these medications    Details   miconazole (MICATIN) 2 % external powder Apply topically 2 times daily Apply to bilateral groin and scrotum.    Associated Diagnoses: Candida infection         CONTINUE these medications which have NOT CHANGED    Details   aspirin 81 MG EC tablet Take 81 mg by mouth daily      atorvastatin (LIPITOR) 10 MG tablet Take 10 mg by mouth At Bedtime      melatonin 3 MG tablet Take 3 mg by mouth nightly as needed for sleep      mirtazapine (REMERON) 45 MG tablet Take 45 mg by mouth At Bedtime      multivitamin w/minerals (THERA-VIT-M) tablet Take 1 tablet by mouth daily      thiamine (B-1) 100 MG tablet Take 100 mg by mouth daily      Vitamin D, Cholecalciferol, 10 MCG (400 UNIT) TABS Take 400 Units by mouth daily                 Consults   Pulmonology    Palliative Care    Immunizations given this encounter     Most Recent Immunizations   Administered Date(s) Administered     Influenza Vaccine >6 months (Alfuria,Fluzone) 11/06/2014           Anticoagulation Information      Recent INR results: No results for input(s): INR in the last 168 hours.  Warfarin doses (if applicable) or name of other anticoagulant:       SIGNIFICANT IMAGING FINDINGS     Results for orders placed or performed during the hospital encounter of 03/20/23   Head CT w/o contrast    Impression    IMPRESSION:  1.  No CT evidence for acute intracranial process.  2.  Brain atrophy and presumed chronic microvascular ischemic changes as above.   XR Chest Port 1 View    Impression    IMPRESSION: Heart size is normal. New 3.1 cm rounded mass in the right lower lobe. Chest CT recommended for further evaluation. No CHF, lobar consolidation, or large effusions. No acute bony abnormalities.    NOTE: ABNORMAL  REPORT    THE DICTATION ABOVE DESCRIBES AN ABNORMALITY FOR WHICH FOLLOW-UP IS NEEDED.    CT Chest w/o Contrast    Impression    IMPRESSION:   1.  3.3 cm right middle lobe mass, highly suspicious for malignancy. This would be amenable for percutaneous biopsy.  2.  Bibasilar bronchial wall thickening and centrilobular infiltrate, compatible with an infectious process.     CT Abdomen Pelvis w Contrast    Impression    IMPRESSION:   1.  The 3 cm right lower lobe mass demonstrates uniform thin rim enhancement and is of fluid density centrally. While there are no other parenchymal changes surrounding it,  favor a pulmonary abscess. Diffusely necrotic tumor seems less likely.  2.  Mild hepatic steatosis.  3.  Other noncritical findings as noted above in the abdomen and pelvis.   Echocardiogram Complete   Result Value Ref Range    LVEF  60-65%        SIGNIFICANT LABORATORY FINDINGS     Most Recent 3 CBC's:Recent Labs   Lab Test 04/11/23  1115 03/27/23  0746 03/26/23  0825   WBC 4.3 9.2 9.0   HGB 13.9 12.3* 12.9*   MCV 93 92 94    228 221     Most Recent 3 BMP's:Recent Labs   Lab Test 04/11/23  1115 03/27/23  0746 03/26/23  0825    139 141   POTASSIUM 4.5 4.1 4.4   CHLORIDE 106 108* 108*   CO2 28 26 26   BUN 18 13 16   CR 0.82 0.80 0.84   ANIONGAP 7 5 7   BERNARDINO 8.9 8.9 8.7   GLC 98 90 71         Discharge Orders        Adult GI  Referral - Consult Only      Adult Pulmonary Medicine Referral      General info for SNF    Length of Stay Estimate: Long Term Care  Condition at Discharge: Stable  Level of care:board and care  Rehabilitation Potential: Good  Admission H&P remains valid and up-to-date: Yes  Recent Chemotherapy: N/A  Use Nursing Home Standing Orders: Yes     Follow Up and recommended labs and tests    Follow up with primary care provider in 1 week.    Follow up with at hepatology & pulmonology clinic. Referrals provided.     Mantoux instructions    Give two-step Mantoux (PPD) Per Facility Policy  Yes     Reason for your hospital stay    Vulnerable adult     Activity - Up with nursing assistance     Physical Therapy Adult Consult    Evaluate and treat as clinically indicated.     Occupational Therapy Adult Consult    Evaluate and treat as clinically indicated.     Diet    Follow this diet upon discharge: Orders Placed This Encounter      Room Service      Easy to Chew Diet (level 7) Thin Liquids (level 0) (NO STRAWS)       Examination   General: In NAD  HEENT: NCAT/EOMI  CV: Normal S1S2, regular rhythm, normal rate  Lungs: CTAB  Abdomen: Soft, NT, ND, +BS  Extremities: No LE edema b/l  Neuro: Awake and alert      Please see EMR for more detailed significant labs, imaging, consultant notes etc.    INano MD, personally saw the patient today and spent greater than 30 minutes discharging this patient.    Nano Moreno MD  [facility]    CC:[facility]

## 2023-04-15 NOTE — PROGRESS NOTES
Care Management Follow Up    Length of Stay (days): 18    Expected Discharge Date: 04/17/2023     Concerns to be Addressed: discharge planning     Patient plan of care discussed at interdisciplinary rounds: Yes    Anticipated Discharge Disposition:  (long term care)  Disposition Comments: long term care TBD  Anticipated Discharge Services: Transportation Services  Anticipated Discharge DME: None    Patient/Family in Agreement with the Plan: yes    Additional Information:    [facility] - received a call from Christine ([phone]) - Financial agreement document received yesterday.  They can accept patient over this weekend 4/15/23 or 4/16/23.    CM notified hospitalist.    M[facility]V wheelchair secured for today 4/15/23 at 14:05 to [facility] (will arrive between 14:05-14:50).  Update given to patient, HUC, charge RN, and Christine at [facility].  Discharge orders faxed in.  PAS done yesterday.     Call to pt's daughter Amber - no answer, left a voicemail (also left 2 voicemails yesterday).  Call to pt's wife () Anamaria - no answer, left a voicemail.    Gerry at [facility] ([phone]) - left a voicemail to Gerry regarding final discharge plans.    12:55 PM  Update given to daughter Amber via phone call.  [facility]' address and phone number provided to Amber.  No further questions from Amber.    No further CM needs identified.    Douglas Chua RN

## 2023-04-15 NOTE — PLAN OF CARE
Problem: Plan of Care - These are the overarching goals to be used throughout the patient stay.    Goal: Readiness for Transition of Care  Outcome: Progressing   Goal Outcome Evaluation:       Alert and oriented but forgetful. Using urinal independently. No c/o pain this shift.

## 2023-04-15 NOTE — PLAN OF CARE
Goal Outcome Evaluation:                      A/O x 4. Flat affect per baseline. [id number]. Continent of urine and used urinal. Skin on buttocks intact. Minimal redness to groin. Discharge pending placement.

## 2023-04-16 LAB — SARS-COV-2 RNA RESP QL NAA+PROBE: NEGATIVE

## 2023-04-17 ENCOUNTER — TRANSCRIBE ORDERS (OUTPATIENT)
Dept: ONCOLOGY | Facility: CLINIC | Age: 68
End: 2023-04-17

## 2023-04-17 ENCOUNTER — NURSING HOME VISIT (OUTPATIENT)
Dept: GERIATRICS | Facility: CLINIC | Age: 68
End: 2023-04-17
Payer: MEDICARE

## 2023-04-17 ENCOUNTER — PATIENT OUTREACH (OUTPATIENT)
Dept: CARE COORDINATION | Facility: CLINIC | Age: 68
End: 2023-04-17

## 2023-04-17 ENCOUNTER — PATIENT OUTREACH (OUTPATIENT)
Dept: ONCOLOGY | Facility: CLINIC | Age: 68
End: 2023-04-17

## 2023-04-17 VITALS
RESPIRATION RATE: 16 BRPM | SYSTOLIC BLOOD PRESSURE: 117 MMHG | WEIGHT: 147.4 LBS | DIASTOLIC BLOOD PRESSURE: 88 MMHG | TEMPERATURE: 97.1 F | OXYGEN SATURATION: 96 % | BODY MASS INDEX: 21.1 KG/M2 | HEART RATE: 76 BPM | HEIGHT: 70 IN

## 2023-04-17 DIAGNOSIS — F10.11 HISTORY OF ALCOHOL ABUSE: ICD-10-CM

## 2023-04-17 DIAGNOSIS — R62.7 ADULT FAILURE TO THRIVE: ICD-10-CM

## 2023-04-17 DIAGNOSIS — B19.20 HEPATITIS C VIRUS INFECTION WITHOUT HEPATIC COMA, UNSPECIFIED CHRONICITY: ICD-10-CM

## 2023-04-17 DIAGNOSIS — I10 PRIMARY HYPERTENSION: ICD-10-CM

## 2023-04-17 DIAGNOSIS — R91.8 LUNG MASS: Primary | ICD-10-CM

## 2023-04-17 DIAGNOSIS — B37.9 CANDIDA INFECTION: ICD-10-CM

## 2023-04-17 DIAGNOSIS — Z86.73 HISTORY OF CVA (CEREBROVASCULAR ACCIDENT): ICD-10-CM

## 2023-04-17 DIAGNOSIS — R91.8 MASS OF MIDDLE LOBE OF RIGHT LUNG: Primary | ICD-10-CM

## 2023-04-17 DIAGNOSIS — T17.908S ASPIRATION INTO AIRWAY, SEQUELA: ICD-10-CM

## 2023-04-17 DIAGNOSIS — F43.23 ADJUSTMENT DISORDER WITH MIXED ANXIETY AND DEPRESSED MOOD: ICD-10-CM

## 2023-04-17 DIAGNOSIS — L89.159 PRESSURE INJURY OF SKIN OF SACRAL REGION, UNSPECIFIED INJURY STAGE: ICD-10-CM

## 2023-04-17 PROCEDURE — 99309 SBSQ NF CARE MODERATE MDM 30: CPT

## 2023-04-17 NOTE — PROGRESS NOTES
New Patient: Interventional Pulmonary (Lung nodule) Nurse Navigator Note    [facility]     Referred to (specialty): Interventional Pulmonary (Lung nodule)    Requested provider (if applicable): n/a    Date Referral Received: 4/15/2023    Evaluation for :  Lung nodule    Clinical History (per Nurse review of records provided):    **BOOK MARKED**    3/24/2023   CT Chest w/o Contrast   IMPRESSION:   1.  3.3 cm right middle lobe mass, highly suspicious for malignancy. This would be amenable for percutaneous biopsy.  2.  Bibasilar bronchial wall thickening and centrilobular infiltrate, compatible with an infectious process.        3/23/23   XR Chest Port 1 View   IMPRESSION: Heart size is normal. New 3.1 cm rounded mass in the right lower lobe. Chest CT recommended for further evaluation. No CHF, lobar consolidation, or large effusions. No acute bony abnormalities.       Records Location (Care Everywhere, Media, etc.): [facility]     Records Needed: none    Additional testing needed prior to consult: PFT's

## 2023-04-17 NOTE — PROGRESS NOTES
[facility]    Background: Transitional Care Management program identified per system criteria and reviewed by [facility] team for possible outreach.    Assessment: Upon chart review, [facility] Team member will not proceed with patient outreach related to this episode of Transitional Care Management program due to reason below:    Patient has a follow up appointment with an appropriate provider today for hospital discharge    Plan: Transitional Care Management episode addressed appropriately per reason noted above.      RAVINDER Ray  [facility], [facility]    *Connected Care Resource Team does NOT follow patient ongoing. Referrals are identified based on internal discharge reports and the outreach is to ensure patient has an understanding of their discharge instructions.

## 2023-04-17 NOTE — PROGRESS NOTES
[facility] GERIATRICS    PRIMARY CARE PROVIDER AND CLINIC:  , [address] / [facility] [address]  Chief Complaint   Patient presents with     Hospital F/U      [facility] Medical Record Number:  [id number]  Place of Service where encounter took place:  [facility]  () [[id number]]    Matheus Nieves  is a 68 year old  (1955) PMH alcoholism, stroke, HTN, admitted to the above facility from  [facility]. Hospital stay 3/20/23 through 4/15/23..    By chart review, patient was brought in by [facility] officials as a [facility] in need [facility]. [facility] awaiting long term placement before his daugher removed him AMA. [facility] visited the home and was concerned about the home and daughter's ability to care for him; he was unable to ambulate and had had a fall. In ED, WBC elevated at 16.3. Bilirubin 1.6, Mag 1.6. He was positive for hepatitis C. CT of the head was negative. Initial plans were to monitor in observation and transfer to [facility]. CXR revealed a new rounded mass in the right lower lobe. CT concerning for malignancy vs abscess, pulmonology was consulted and recommend follow-up imaging in 6 weeks. Zosyn was added with concern for abscess. Blood cultures returned positive for gram positive cocci, varigene coag negative staph. He was treated with vanco but leukocytosis previously resolved, thought likely contaminant. Also with concern for aspiration, declined modified diet per SLP recommendations.    HPI:    Seen today in his room in LTC up to wheelchair. He would like to get in bed. Denies acute concerns, does not offer any history and participates minimally in ROS. He denies pain. He slept well and has a good appetite. He is denying CP, SOB.    CODE STATUS/ADVANCE DIRECTIVES DISCUSSION:  Prior  DNR / DNI  ALLERGIES: No Known Allergies   PAST MEDICAL HISTORY: No  "past medical history on file.   PAST SURGICAL HISTORY:   has no past surgical history on file.  FAMILY HISTORY: family history includes Hypertension in his mother.  SOCIAL HISTORY:   reports that he quit smoking about 8 years ago. He has never used smokeless tobacco. He reports current alcohol use. He reports current drug use. Frequency: 1.00 time per week. Drug: Marijuana.  Patient's living condition: lives in a skilled nursing facility    Post Discharge Medication Reconciliation Status:   MED REC REQUIRED  Post Medication Reconciliation Status:  Discharge medications reconciled, continue medications without change         Current Outpatient Medications   Medication Sig     aspirin 81 MG EC tablet Take 81 mg by mouth daily     atorvastatin (LIPITOR) 10 MG tablet Take 10 mg by mouth At Bedtime     melatonin 3 MG tablet Take 3 mg by mouth nightly as needed for sleep     miconazole (MICATIN) 2 % external powder Apply topically 2 times daily Apply to bilateral groin and scrotum.     mirtazapine (REMERON) 45 MG tablet Take 45 mg by mouth At Bedtime     multivitamin w/minerals (THERA-VIT-M) tablet Take 1 tablet by mouth daily     thiamine (B-1) 100 MG tablet Take 100 mg by mouth daily     Vitamin D, Cholecalciferol, 10 MCG (400 UNIT) TABS Take 400 Units by mouth daily     No current facility-administered medications for this visit.       ROS:  Limited secondary to cognitive impairment/participation but today pt reports the above    Vitals:  /88   Pulse 76   Temp 97.1  F (36.2  C)   Resp 16   Ht 1.778 m (5' 10\")   Wt 66.9 kg (147 lb 6.4 oz)   SpO2 96%   BMI 21.15 kg/m    Exam:  GENERAL APPEARANCE:  Alert, in no distress, chronically ill, frail appearing  RESP:  respiratory effort and palpation of chest normal, lungs clear to auscultation , no respiratory distress  CV:  Palpation and auscultation of heart done , regular rate and rhythm, no murmur, rub, or gallop, no edema  ABDOMEN:  normal bowel sounds, soft, " nontender, no hepatosplenomegaly or other masses  M/S:   Gait and station abnormal transfers with assist, wheelchair for mobility  SKIN:  Inspection of skin and subcutaneous tissue baseline, Palpation of skin and subcutaneous tissue baseline  NEURO:   Cranial nerves 2-12 are normal tested and grossly at patient's baseline, ricci  PSYCH:  memory impaired , flat affect, disengaged    Lab/Diagnostic data:  Labs done in SNF are in [facility]. Please refer to them using EPIC/Care Everywhere. and Recent labs in [facility] reviewed by me today.     ASSESSMENT/PLAN:    (R91.8) Mass of middle lobe of right lung  (primary encounter diagnosis)  Comment: Acute, possible abscess vs malignancy. Treated with zosyn. Asymptomatic.   Plan: follow-up with pulmonology and for repeat CT as ordered. Nursing to assist.    (T17.908S) Aspiration into airway, sequela  Comment: Chronic, declined modified diet per SLP inpatient.  -Screened by SLP and declining additional services.  Plan: continue regular diet per patient request    (B19.20) Hepatitis C virus infection without hepatic coma, unspecified chronicity  Comment: Chronic, with elevated AST, ALT  Plan: follow-up with hepatology per recommendations, nursing to assist. Monitor LFTs periodically    (B37.9) Candida infection  Comment: chronic  Plan: miconazole, routine skin monitoring per nursing     (F10.11) History of alcohol abuse  Comment: by history, drank a pint of vodka three times weekly  Plan: B-1, MVI, encourage continued cessation    (I10) Primary hypertension  Comment: chronic, by history, currently off of antihypertensives  BP Readings from Last 3 Encounters:   04/17/23 117/88   04/15/23 132/89   Plan: monitor BP off of medicatoins    (R62.7) Adult failure to thrive  Comment: [facility] the [facility] with difficult [facility]. Complicated by psychosocial issues.    -reviewed management with nursing facility staff today  Plan: SNF for assist with ADLs, medication  management, meals, activities. PT/OT.    (C47.159) Pressure injury of skin of sacral region, unspecified injury stage  Comment: present on admission  Plan: offloading, repositioning per nursing, wound care per nursing as directed by WOC, consult wound care provider PRN, Rd following    (F43.23) Adjustment disorder with mixed anxiety and depressed mood  Comment: chronic, disengaged and flat today.   Plan: Continue mirtazapine 45 mg at HS, monitor mood, behaviors. ACP/psychatiry PRN, melatonin for sleep    (Z86.73) History of CVA (cerebrovascular accident)  Comment: by history, limited records available as he follows with the [facility].   Plan: statin, ASA      Electronically signed by:  JOHANNA Boone CNP

## 2023-04-18 ENCOUNTER — TELEPHONE (OUTPATIENT)
Dept: PULMONOLOGY | Facility: OTHER | Age: 68
End: 2023-04-18

## 2023-04-20 ENCOUNTER — LAB REQUISITION (OUTPATIENT)
Dept: LAB | Facility: CLINIC | Age: 68
End: 2023-04-20
Payer: MEDICARE

## 2023-04-20 DIAGNOSIS — U07.1 COVID-19: ICD-10-CM

## 2023-04-20 NOTE — PATIENT INSTRUCTIONS
Steven Nieves  1955     Please assist patient to arrange follow-up with  pulmonology Dr. Crystal Villanueva or NP with CT prior around May 1. Referral in [facility] dx: lung mass  Please assist patient to arrange follow-up with  hepatology, referral in [facility] dx: hepatitis C    JOHANNA Boone CNP on 4/19/2023 at 10:50 PM

## 2023-04-21 ENCOUNTER — NURSING HOME VISIT (OUTPATIENT)
Dept: GERIATRICS | Facility: CLINIC | Age: 68
End: 2023-04-21
Payer: MEDICARE

## 2023-04-21 VITALS
WEIGHT: 146 LBS | RESPIRATION RATE: 16 BRPM | HEART RATE: 71 BPM | HEIGHT: 70 IN | TEMPERATURE: 97.4 F | BODY MASS INDEX: 20.9 KG/M2 | SYSTOLIC BLOOD PRESSURE: 117 MMHG | DIASTOLIC BLOOD PRESSURE: 77 MMHG | OXYGEN SATURATION: 96 %

## 2023-04-21 DIAGNOSIS — F10.11 HISTORY OF ALCOHOL ABUSE: ICD-10-CM

## 2023-04-21 DIAGNOSIS — R41.89 COGNITIVE IMPAIRMENT: ICD-10-CM

## 2023-04-21 DIAGNOSIS — T17.908S ASPIRATION INTO AIRWAY, SEQUELA: ICD-10-CM

## 2023-04-21 DIAGNOSIS — B19.20 HEPATITIS C VIRUS INFECTION WITHOUT HEPATIC COMA, UNSPECIFIED CHRONICITY: ICD-10-CM

## 2023-04-21 DIAGNOSIS — Z86.73 HISTORY OF CVA (CEREBROVASCULAR ACCIDENT): ICD-10-CM

## 2023-04-21 DIAGNOSIS — B37.9 CANDIDA INFECTION: ICD-10-CM

## 2023-04-21 DIAGNOSIS — R91.8 MASS OF MIDDLE LOBE OF RIGHT LUNG: Primary | ICD-10-CM

## 2023-04-21 DIAGNOSIS — R62.7 ADULT FAILURE TO THRIVE: ICD-10-CM

## 2023-04-21 DIAGNOSIS — L89.159 PRESSURE INJURY OF SKIN OF SACRAL REGION, UNSPECIFIED INJURY STAGE: ICD-10-CM

## 2023-04-21 DIAGNOSIS — F51.02 ADJUSTMENT INSOMNIA: ICD-10-CM

## 2023-04-21 DIAGNOSIS — F43.23 ADJUSTMENT DISORDER WITH MIXED ANXIETY AND DEPRESSED MOOD: ICD-10-CM

## 2023-04-21 DIAGNOSIS — I10 PRIMARY HYPERTENSION: ICD-10-CM

## 2023-04-21 PROCEDURE — 99309 SBSQ NF CARE MODERATE MDM 30: CPT

## 2023-04-21 NOTE — PROGRESS NOTES
[facility] GERIATRICS    Chief Complaint   Patient presents with     Nursing Home Acute     HPI:  Matheus Nieves is a 68 year old  (1955), who is being seen today for an episodic care visit at: [facility]  () [[id number]].     By chart review, patient was brought in by [facility] officials as a [facility] in need of placement. [facility] the [facility] last year awaiting long term placement before his daugher removed him AMA. [facility] visited the home and was concerned about the home and daughter's ability to care for him; he was unable to ambulate and had had a fall. In ED, WBC elevated at 16.3. Bilirubin 1.6, Mag 1.6. He was positive for hepatitis C. CT of the head was negative. Initial plans were to monitor in observation and transfer to VA. CXR revealed a new rounded mass in the right lower lobe. CT concerning for malignancy vs abscess, pulmonology was consulted and recommend follow-up imaging in 6 weeks. Zosyn was added with concern for abscess. Blood cultures returned positive for gram positive cocci, varigene coag negative staph. He was treated with vanco but leukocytosis previously resolved, thought likely contaminant. Also with concern for aspiration, declined modified diet per SLP recommendations.    Today's concern is: Seen today for routine follow-up in LTC resting abed. He is resting, eyes closed. Minimally interactive with exam/ROS. He reports he did not sleep well last night. He has a good appetite. He denies pain. Attempted to discuss necessary pulmonology follow-up and he did not engage, did seem to indicate that he would attend follow up if arranged. He denies CP, SOB, changes in b/b habits, fever/chills.    Allergies, and PMH/PSH reviewed in EPIC today.  REVIEW OF SYSTEMS:  Limited secondary to cognitive impairment but today pt reports the above    Objective:   /77   Pulse 71   Temp 97.4  F (36.3  C)   Resp 16   Ht 1.778 m (5'  "10\")   Wt 66.2 kg (146 lb)   SpO2 96%   BMI 20.95 kg/m    GENERAL APPEARANCE:  Alert, in no distress, frail apperaing, minimallly cooperative  RESP:  respiratory effort and palpation of chest normal, lungs clear to auscultation , no respiratory distress  CV:  Palpation and auscultation of heart done , regular rate and rhythm, no murmur, rub, or gallop, no edema  ABDOMEN:  normal bowel sounds, soft, nontender, no hepatosplenomegaly or other masses  M/S:   Gait and station abnormal transfers with assist, wheelchair for mobility  SKIN:  Inspection of skin and subcutaneous tissue baseline, Palpation of skin and subcutaneous tissue baseline  NEURO:   Cranial nerves 2-12 are normal tested and grossly at patient's baseline, generalized weakness  PSYCH:  insight and judgement impaired, memory impaired , flat affect, disengaged    Labs done in SNF are in [place] EPIC. Please refer to them using EPIC/Care Everywhere. and Recent labs in EPIC reviewed by me today.     Assessment/Plan:  (R91.8) Mass of middle lobe of right lung  (primary encounter diagnosis)  Comment: Acute, possible abscess vs malignancy. Treated with zosyn. Asymptomatic. Does agree to attend follow-up if arranaged, have requested nursing coordinate.  Plan: follow-up with pulmonology and for repeat CT as ordered. Nursing to assist.     (T17.908S) Aspiration into airway, sequela  Comment: Chronic, declined modified diet per SLP inpatient.  -Screened by SLP in facility and declining additional services.  Plan: continue regular diet per patient request. Monitor for s/sx infection.     (B19.20) Hepatitis C virus infection without hepatic coma, unspecified chronicity  Comment: Chronic, with elevated AST, ALT  Plan: follow-up with hepatology per recommendations, nursing to assist. Monitor LFTs periodically     (B37.9) Candida infection  Comment: chronic  Plan: miconazole, routine skin monitoring per nursing      (F10.11) History of alcohol abuse  Comment: by " history, drank a pint of vodka three times weekly  Plan: B-1, MVI, encourage continued cessation in LTC     (I10) Primary hypertension  Comment: chronic, by history, currently off of antihypertensives  BP Readings from Last 3 Encounters:   04/21/23 117/77   04/17/23 117/88   04/15/23 132/89   Plan: monitor BP off of medicatoins     (R62.7) Adult failure to thrive  Comment: Prolonged hospitalization at the VA with difficult placement. Complicated by psychosocial issues. Attempted to reach patient's daughter via telephone today without answer.  -reviewed management with nursing facility staff today  Plan: SNF for assist with ADLs, medication management, meals, activities. PT/OT.     (L89.159) Pressure injury of skin of sacral region, unspecified injury stage - resolved  Plan: offloading, repositioning per nursing, wound care per nursing as directed by WOC, consult wound care provider PRN, Rd following      (F43.23) Adjustment disorder with mixed anxiety and depressed mood  Comment: chronic, disengaged with low initiative   Plan: Continue mirtazapine 45 mg at HS, monitor mood, behaviors. ACP/psychatiry PRN, melatonin for sleep     (Z86.73) History of CVA (cerebrovascular accident)  Comment: by history, limited records available as he follows with the VA.   Plan: statin, ASA    (F51.02) Adjustment insomnia  Comment: by patient report. He has not used PRN melatonin. Will stop and start 1 mg daily at HS  Plan: Melatonin 1 mg daily at HS, monitor sleep habits    (R41.89) Cognitive impairment  Comment: chronic, SLUMS 15/30. Step daughter helps with decision making and named [facility], though recent VA due to her removing him from hospital AMA  Plan:  following. SNF for assist with ADLs, medication management, meals, activities    Electronically signed by: JOHANNA Boone CNP

## 2023-04-21 NOTE — PATIENT INSTRUCTIONS
Orders  Matheus Nieves  1955     Cancel current melatonin orders  Melatonin 1 mg nightly at HS dx: insomnia      JOHANNA Boone CNP on 4/21/2023 at 1:09 PM

## 2023-04-24 ENCOUNTER — LAB REQUISITION (OUTPATIENT)
Dept: LAB | Facility: CLINIC | Age: 68
End: 2023-04-24
Payer: MEDICARE

## 2023-04-24 ENCOUNTER — NURSING HOME VISIT (OUTPATIENT)
Dept: GERIATRICS | Facility: CLINIC | Age: 68
End: 2023-04-24
Payer: MEDICARE

## 2023-04-24 VITALS
WEIGHT: 146 LBS | DIASTOLIC BLOOD PRESSURE: 77 MMHG | TEMPERATURE: 97.4 F | SYSTOLIC BLOOD PRESSURE: 117 MMHG | HEIGHT: 70 IN | HEART RATE: 71 BPM | RESPIRATION RATE: 16 BRPM | BODY MASS INDEX: 20.9 KG/M2 | OXYGEN SATURATION: 96 %

## 2023-04-24 DIAGNOSIS — T17.908S ASPIRATION INTO AIRWAY, SEQUELA: ICD-10-CM

## 2023-04-24 DIAGNOSIS — I67.9 CEREBRAL VASCULAR DISEASE: ICD-10-CM

## 2023-04-24 DIAGNOSIS — R91.8 MASS OF MIDDLE LOBE OF RIGHT LUNG: Primary | ICD-10-CM

## 2023-04-24 DIAGNOSIS — R62.7 ADULT FAILURE TO THRIVE: ICD-10-CM

## 2023-04-24 DIAGNOSIS — F10.20 ALCOHOL DEPENDENCE, UNCOMPLICATED (H): ICD-10-CM

## 2023-04-24 DIAGNOSIS — R41.89 COGNITIVE IMPAIRMENT: ICD-10-CM

## 2023-04-24 DIAGNOSIS — F33.9 RECURRENT MAJOR DEPRESSIVE DISORDER, REMISSION STATUS UNSPECIFIED (H): ICD-10-CM

## 2023-04-24 DIAGNOSIS — U07.1 COVID-19: ICD-10-CM

## 2023-04-24 DIAGNOSIS — B19.20 HEPATITIS C VIRUS INFECTION WITHOUT HEPATIC COMA, UNSPECIFIED CHRONICITY: ICD-10-CM

## 2023-04-24 PROCEDURE — 99305 1ST NF CARE MODERATE MDM 35: CPT | Performed by: INTERNAL MEDICINE

## 2023-04-24 PROCEDURE — U0003 INFECTIOUS AGENT DETECTION BY NUCLEIC ACID (DNA OR RNA); SEVERE ACUTE RESPIRATORY SYNDROME CORONAVIRUS 2 (SARS-COV-2) (CORONAVIRUS DISEASE [COVID-19]), AMPLIFIED PROBE TECHNIQUE, MAKING USE OF HIGH THROUGHPUT TECHNOLOGIES AS DESCRIBED BY CMS-2020-01-R: HCPCS

## 2023-04-25 LAB — SARS-COV-2 RNA RESP QL NAA+PROBE: NEGATIVE

## 2023-04-27 ENCOUNTER — LAB REQUISITION (OUTPATIENT)
Dept: LAB | Facility: CLINIC | Age: 68
End: 2023-04-27
Payer: MEDICARE

## 2023-04-27 DIAGNOSIS — U07.1 COVID-19: ICD-10-CM

## 2023-04-27 PROCEDURE — U0003 INFECTIOUS AGENT DETECTION BY NUCLEIC ACID (DNA OR RNA); SEVERE ACUTE RESPIRATORY SYNDROME CORONAVIRUS 2 (SARS-COV-2) (CORONAVIRUS DISEASE [COVID-19]), AMPLIFIED PROBE TECHNIQUE, MAKING USE OF HIGH THROUGHPUT TECHNOLOGIES AS DESCRIBED BY CMS-2020-01-R: HCPCS

## 2023-04-28 LAB — SARS-COV-2 RNA RESP QL NAA+PROBE: NEGATIVE

## 2023-05-01 ENCOUNTER — LAB REQUISITION (OUTPATIENT)
Dept: LAB | Facility: CLINIC | Age: 68
End: 2023-05-01
Payer: MEDICARE

## 2023-05-01 DIAGNOSIS — U07.1 COVID-19: ICD-10-CM

## 2023-05-01 PROCEDURE — U0005 INFEC AGEN DETEC AMPLI PROBE: HCPCS

## 2023-05-02 LAB — SARS-COV-2 RNA RESP QL NAA+PROBE: NEGATIVE

## 2023-05-03 PROBLEM — F33.9 RECURRENT MAJOR DEPRESSIVE DISORDER, REMISSION STATUS UNSPECIFIED (H): Status: ACTIVE | Noted: 2023-05-03

## 2023-05-03 PROBLEM — F10.20 ALCOHOL DEPENDENCE, UNCOMPLICATED (H): Status: ACTIVE | Noted: 2023-05-03

## 2023-05-04 ENCOUNTER — LAB REQUISITION (OUTPATIENT)
Dept: LAB | Facility: CLINIC | Age: 68
End: 2023-05-04
Payer: MEDICARE

## 2023-05-04 DIAGNOSIS — U07.1 COVID-19: ICD-10-CM

## 2023-05-04 PROCEDURE — 87635 SARS-COV-2 COVID-19 AMP PRB: CPT

## 2023-05-04 NOTE — PROGRESS NOTES
"Matheus Nieves is a 68 year old male seen 2023 at [facility] where he was admitted after [facility] stay 3/20 to 4/15 for failure to thrive and right lung abscess vs mass.    Pt is seen in his room lying abed.   Very flat and minimal history given, states he is okay.  Would like to get his TV fixed.   Denies any current pain or dyspnea.      By chart review, pt had been at the [facility] awaiting LTC placement since 2022, but in March his stepdaughter took him out against medical advice.  A vulnerable adult case was filed and the [facility]  found the pt to be in an unsafe situation so he was sent to [facility] for placement.  Admission labs showed leukocytosis and elevated bilirubin with Hepatitis C+   Imaging as below, seen by Pulmonology and treated with IV Zosyn for possible lung abscess.  Then repeat imaging at 6 weeks    Pt discharged to LTC on modified diet, is dependent for transfers and cares.          PMH:   Alcohol dependence  Cerebral vascular disease, h/o stroke  HTN  Vit D deficiency  Depression  RML lung abscess   Hepatitis C  Multiple decubiti    Family History   Problem Relation Age of Onset     Hypertension Mother        Social History     Tobacco Use     Smoking status: Former     Types: Cigarettes     Quit date: 2014     Years since quittin.7     Smokeless tobacco: Never   Vaping Use     Vaping status: Not on file   Substance Use Topics     Alcohol use: Yes     Alcohol/week: 0.0 standard drinks of alcohol     Comment: Alcoholic Drinks/day: voldka pint 3 times a week      SH:  Wife Anamaria, stepdaughters Amber and Tammy  Daughter Keri .   Sister Elisabeth  Pt lived with Amber for 8 years until 2022 hospitalization  Service-[employer], [employer]   Worked in construction    ROS: limited by pt as above   SLUMS 15/30    EXAM: NAD  /77   Pulse 71   Temp 97.4  F (36.3  C)   Resp 16   Ht 1.778 m (5' 10\")   Wt 66.2 kg (146 lb)   " SpO2 96%   BMI 20.95 kg/m     Neck supple without adenopathy  Lungs with decreased BS diffusely  Heart RRR s1s2   Abd soft, NT, no distention or guarding, +BS  Ext without edema, +sarcopenia  Neuro: +paucity of thought, limited history, generalized weakness     Psych: very flat     Last Comprehensive Metabolic Panel:  Lab Results   Component Value Date     04/11/2023    POTASSIUM 4.5 04/11/2023    CHLORIDE 106 04/11/2023    CO2 28 04/11/2023    ANIONGAP 7 04/11/2023    GLC 98 04/11/2023    BUN 18 04/11/2023    CR 0.82 04/11/2023    GFRESTIMATED >90 04/11/2023    BERNARDINO 8.9 04/11/2023     Lab Results   Component Value Date    AST 62 03/22/2023      ALBUMIN 2.7 03/22/2023      ALKPHOS 77 03/22/2023     Lab Results   Component Value Date    WBC 4.3 04/11/2023      HGB 13.9 04/11/2023      MCV 93 04/11/2023       04/11/2023     TSH   Date Value Ref Range Status   03/20/2023 1.59 0.30 - 5.00 uIU/mL Final      XR VIDEO SWALLOW WITH SLP OR OT 3/28/2023   Swallow study with Speech Pathology using multiple barium thicknesses. Aspiration was identified with thin liquid and mildly thick liquid. Inconsistent cough reflex present. Delay and early pour over noted. Minimal penetration with honey consistency, though no aspiration.    CT CHEST W/O CONTRAST  3/24/2023   INDICATION: New Right lower lobe rounded mass (3.1 cm) per CXR; Pulmonary nodule evaluation; High risk appearing nodule;   LUNGS AND PLEURA: There is a 3.3 x 2.9 cm mass involving the right middle lobe (series 5, image 210). There is bilateral lower lobe and right middle lobe bronchial wall thickening with faint centrilobular nodularity, most prominent at the left base. Findings are compatible with the sequela of infection. There is also some probable bibasilar atelectasis posteriorly. Elsewhere throughout the bilateral upper lobes are a few scattered tiny pulmonary nodules measuring 3 mm in size or less, likely benign.  MEDIASTINUM/AXILLAE: Heart size is  normal. Ascending thoracic aorta is prominent measuring 4.2 cm in caliber. There are multiple small lymph nodes throughout the mediastinum which are not enlarged by size criteria.  CORONARY ARTERY CALCIFICATION: Severe.  UPPER ABDOMEN: A 9 mm right adrenal nodule demonstrates Hounsfield units of 3, compatible with a benign adrenal adenoma.  MUSCULOSKELETAL: No suspicious osseous lesions.                                                             IMPRESSION:   1.  3.3 cm right middle lobe mass, highly suspicious for malignancy. This would be amenable for percutaneous biopsy.  2.  Bibasilar bronchial wall thickening and centrilobular infiltrate, compatible with an infectious process.    CT ABDOMEN PELVIS W CONTRAST  3/24/2023   LOWER CHEST: There is a thin, rim-enhancing 3 x 2.4 cm mass inferiorly in the right middle lobe abutting the diaphragmatic pleura. This is homogeneously low dense centrally measuring 10 Hounsfield units. No air within it or surrounding pulmonary opacities. Minimal subpleural bibasilar atelectasis. No effusions.  HEPATOBILIARY: No suspicious liver lesions. Mild fatty infiltration.   KIDNEYS/BLADDER: Small, renal cysts are noted bilaterally which need no follow-up. Delayed images demonstrate symmetric excretion.  MUSCULOSKELETAL: Large Schmorl's nodule along the superior endplate of L3. No suspicious lesions.                                                             IMPRESSION:   1.  The 3 cm right lower lobe mass demonstrates uniform thin rim enhancement and is of fluid density centrally. While there are no other parenchymal changes surrounding it,  favor a pulmonary abscess. Diffusely necrotic tumor seems less likely.  2.  Mild hepatic steatosis.  3.  Other noncritical findings as noted above in the abdomen and pelvis.    CT HEAD W/O CONTRAST   3/20/2023  INTRACRANIAL CONTENTS: No intracranial hemorrhage, extraaxial collection, or mass effect.  No CT evidence of cortical acute infarct.  Lacunar infarcts are noted in the bilateral basal ganglia, thalami, and in the corona radiata. Moderate presumed chronic small vessel ischemic changes. Moderate generalized volume loss. No hydrocephalus.          IMP/PLAN:   (R91.8) Mass of middle lobe of right lung  (primary encounter diagnosis)  Comment: treated with a course of Zosyn for possible abscess, although this may be a malignancy, as above.     Plan: repeat chest CT now at 6 weeks      (T17.908S) Aspiration into airway, sequela  Comment: seen by Speech Therapy and modified diet recommended     Plan: pt has declined any modification, choosing regular diet       (B19.20) Hepatitis C virus infection without hepatic coma, unspecified chronicity  Comment: found to be + during recent hospitalization, with mildly elevated transaminases     Plan: follow LFTs   Hepatology follow up        (F10.20) Alcohol dependence, uncomplicated (H)  Comment: heavy drinking preceded June hospitalization   Plan: thiamine 100 mg/day and daily MVI     (F33.9) Recurrent major depressive disorder, remission status unspecified (H)  Comment: very flat and disengaged today   Plan: mirtazapine 45 mg/day   Melatonin 3 mg/HS for sleep   ACP referral if pt agreeable      (R41.89) Cognitive impairment  (R62.7) Adult failure to thrive  Comment: related to all above   Plan: supportive care   LTC support for med admin, meals, activity      (I67.9) Cerebral vascular disease  Comment: multiple lacunar infarcts as above     Plan: remains on aspirin 81 mg/day and atorvastatin 10 mg/day for secondary prevention      Chiquita Kelly MD

## 2023-05-05 LAB — SARS-COV-2 RNA RESP QL NAA+PROBE: NEGATIVE

## 2023-05-08 ENCOUNTER — LAB REQUISITION (OUTPATIENT)
Dept: LAB | Facility: CLINIC | Age: 68
End: 2023-05-08
Payer: MEDICARE

## 2023-05-08 DIAGNOSIS — U07.1 COVID-19: ICD-10-CM

## 2023-05-08 PROCEDURE — 87635 SARS-COV-2 COVID-19 AMP PRB: CPT | Mod: ORL

## 2023-05-09 ENCOUNTER — NURSING HOME VISIT (OUTPATIENT)
Dept: GERIATRICS | Facility: CLINIC | Age: 68
End: 2023-05-09
Payer: MEDICARE

## 2023-05-09 ENCOUNTER — LAB REQUISITION (OUTPATIENT)
Dept: LAB | Facility: CLINIC | Age: 68
End: 2023-05-09
Payer: MEDICARE

## 2023-05-09 VITALS
DIASTOLIC BLOOD PRESSURE: 78 MMHG | TEMPERATURE: 97.1 F | BODY MASS INDEX: 21.27 KG/M2 | OXYGEN SATURATION: 92 % | SYSTOLIC BLOOD PRESSURE: 120 MMHG | HEART RATE: 73 BPM | RESPIRATION RATE: 18 BRPM | HEIGHT: 70 IN | WEIGHT: 148.6 LBS

## 2023-05-09 DIAGNOSIS — F10.20 ALCOHOL DEPENDENCE, UNCOMPLICATED (H): ICD-10-CM

## 2023-05-09 DIAGNOSIS — B19.20 HEPATITIS C VIRUS INFECTION WITHOUT HEPATIC COMA, UNSPECIFIED CHRONICITY: ICD-10-CM

## 2023-05-09 DIAGNOSIS — Z11.1 ENCOUNTER FOR SCREENING FOR RESPIRATORY TUBERCULOSIS: ICD-10-CM

## 2023-05-09 DIAGNOSIS — R91.8 MASS OF MIDDLE LOBE OF RIGHT LUNG: Primary | ICD-10-CM

## 2023-05-09 DIAGNOSIS — F33.9 RECURRENT MAJOR DEPRESSIVE DISORDER, REMISSION STATUS UNSPECIFIED (H): ICD-10-CM

## 2023-05-09 LAB — SARS-COV-2 RNA RESP QL NAA+PROBE: NEGATIVE

## 2023-05-09 PROCEDURE — 99309 SBSQ NF CARE MODERATE MDM 30: CPT

## 2023-05-09 NOTE — PROGRESS NOTES
[facility] GERIATRICS  Chief Complaint   Patient presents with     group home Regulatory     [place] Medical Record Number:  [id number]  Place of Service where encounter took place:  [facility]  () [[id number]]    HPI:    Matheus Nieves  is 68 year old (1955), who is being seen today for a federally mandated E/M visit.     By chart review, patient was brought in by [facility] officials as a [facility] in need of placement. He had been at the VA hospital since June of last year awaiting long term placement before his daugher removed him AMA. [facility] visited the home and was concerned about the home and daughter's ability to care for him; he was unable to ambulate and had had a fall. In ED, WBC elevated at 16.3. Bilirubin 1.6, Mag 1.6. He was positive for hepatitis C. CT of the head was negative. Initial plans were to monitor in observation and transfer to VA. CXR revealed a new rounded mass in the right lower lobe. CT concerning for malignancy vs abscess, pulmonology was consulted and recommend follow-up imaging in 6 weeks. Zosyn was added with concern for abscess. Blood cultures returned positive for gram positive cocci, varigene coag negative staph. He was treated with vanco but leukocytosis previously resolved, thought likely contaminant. Also with concern for aspiration, declined modified diet per SLP recommendations.  He was admitted to the above facility for permanent placement.    Today's concerns are:  Seen today for routine follow-up in his room and long-term care resting in bed.  He is denying acute concerns.  He does endorse constipation earlier this week reported by nursing.  He denies abdominal pain or changes in appetite.  He does not offer additional history with limited participation in exam.  He denies chest pain, shortness of breath, fever/chills.  Nursing report concern for alcohol intake on EVIE over the weekend.    ALLERGIES:Patient has no known  allergies.  PAST MEDICAL HISTORY: No past medical history on file.  PAST SURGICAL HISTORY:   has no past surgical history on file.  FAMILY HISTORY: family history includes Hypertension in his mother.  SOCIAL HISTORY:  reports that he quit smoking about 8 years ago. He has never used smokeless tobacco. He reports current alcohol use. He reports current drug use. Frequency: 1.00 time per week. Drug: Marijuana.    MEDICATIONS:  MED REC REQUIRED  Post Medication Reconciliation Status:  Medication reconciliation previously completed during another office visit           Review of your medicines          Accurate as of May 9, 2023 11:59 PM. If you have any questions, ask your nurse or doctor.            CONTINUE these medicines which have NOT CHANGED      Dose / Directions   aspirin 81 MG EC tablet      Dose: 81 mg  Take 81 mg by mouth daily  Refills: 0     atorvastatin 10 MG tablet  Commonly known as: LIPITOR      Dose: 10 mg  Take 10 mg by mouth At Bedtime  Refills: 0     melatonin 3 MG tablet      Dose: 1 mg  Take 1 mg by mouth At Bedtime  Refills: 0     miconazole 2 % external powder  Commonly known as: MICATIN  Used for: Candida infection      Apply topically 2 times daily Apply to bilateral groin and scrotum.  Refills: 0     mirtazapine 45 MG tablet  Commonly known as: REMERON      Dose: 45 mg  Take 45 mg by mouth At Bedtime  Refills: 0     multivitamin w/minerals tablet      Dose: 1 tablet  Take 1 tablet by mouth daily  Refills: 0     thiamine 100 MG tablet  Commonly known as: B-1      Dose: 100 mg  Take 100 mg by mouth daily  Refills: 0     Vitamin D (Cholecalciferol) 10 MCG (400 UNIT) Tabs      Dose: 400 Units  Take 400 Units by mouth daily  Refills: 0             Case Management:  I have reviewed the care plan and MDS and do agree with the plan. Patient's desire to return to the community is present, but is not able due to care needs . Information reviewed:  Medications, vital signs, orders, and nursing  "notes.    ROS:  Limited secondary to cognitive impairment but today pt reports the above    Vitals:  /78   Pulse 73   Temp 97.1  F (36.2  C)   Resp 18   Ht 1.778 m (5' 10\")   Wt 67.4 kg (148 lb 9.6 oz)   SpO2 92%   BMI 21.32 kg/m    Body mass index is 21.32 kg/m .  Exam:  GENERAL APPEARANCE:  Alert, in no distress  RESP:  respiratory effort and palpation of chest normal, lungs clear to auscultation , no respiratory distress  CV:  Palpation and auscultation of heart done , regular rate and rhythm, no murmur, rub, or gallop, no edema  ABDOMEN:  normal bowel sounds, soft, nontender, no hepatosplenomegaly or other masses  M/S:   Gait and station abnormal Transfers with assist, wheelchair for mobility  SKIN:  Inspection of skin and subcutaneous tissue baseline, Palpation of skin and subcutaneous tissue baseline  NEURO:   Cranial nerves 2-12 are normal tested and grossly at patient's baseline, POLLARD  PSYCH:  memory impaired , Flat affect    Lab/Diagnostic data:   Labs done in SNF are in [place] [employer]. Please refer to them using [employer]/Care Everywhere. and Recent labs in EPIC reviewed by me today.     ASSESSMENT/PLAN  (R91.8) Mass of middle lobe of right lung  (primary encounter diagnosis)  Comment: Acute, with concern for possible abscess versus malignancy.  He is overdue for repeat CT and pulmonology follow-up, previously requested nursing scheduled dose, will reorder today for follow-up ASAP.  Plan: Follow-up with pulmonology and for repeat CT as ordered, nursing to assist.  Monitor respiratory status    (B19.20) Hepatitis C virus infection without hepatic coma, unspecified chronicity  Comment: Chronic, with elevated AST, ALT.  Denying symptoms  - Will reorder nursing assist for follow-up with hepatology for recommendations  Plan: Follow-up with hepatology per recommendations, monitor LFTs periodically    (F10.20) Alcohol dependence, uncomplicated (H)  Comment: Chronic, nursing report concern for alcohol " consumption on the latest weekend.  Patient's family reportedly in agreement with not supplying alcohol in the future  - Management discussed with nursing facility staff today  Plan: Thiamine, multivitamin, encourage continued cessation    (F33.9) Recurrent major depressive disorder, remission status unspecified (H)  Comment: Chronic, very flat and disengaged, low initiative.  Plan: Mirtazapine 45 mg at at bedtime, monitor mood, behaviors.  ACP as needed, melatonin for sleep    Electronically signed by:  JOHANNA Boone CNP

## 2023-05-10 PROCEDURE — P9604 ONE-WAY ALLOW PRORATED TRIP: HCPCS

## 2023-05-10 PROCEDURE — 86481 TB AG RESPONSE T-CELL SUSP: CPT

## 2023-05-10 NOTE — PATIENT INSTRUCTIONS
Steven Nieves  1955     Please assist patient to arrange follow-up with  pulmonology Dr. Crystal Villanueva or NP with CT prior asap Referral in [facility] dx: lung mass  Please assist patient to arrange follow-up with  hepatology, referral in [facility] dx: hepatitis C      JOHANNA Boone CNP on 5/10/2023 at 9:38 AM

## 2023-05-11 ENCOUNTER — LAB REQUISITION (OUTPATIENT)
Dept: LAB | Facility: CLINIC | Age: 68
End: 2023-05-11
Payer: MEDICARE

## 2023-05-11 DIAGNOSIS — U07.1 COVID-19: ICD-10-CM

## 2023-05-11 PROCEDURE — 87635 SARS-COV-2 COVID-19 AMP PRB: CPT

## 2023-05-12 LAB
GAMMA INTERFERON BACKGROUND BLD IA-ACNC: 0.02 IU/ML
M TB IFN-G BLD-IMP: NEGATIVE
M TB IFN-G CD4+ BCKGRND COR BLD-ACNC: 9.98 IU/ML
MITOGEN IGNF BCKGRD COR BLD-ACNC: 0.01 IU/ML
MITOGEN IGNF BCKGRD COR BLD-ACNC: 0.08 IU/ML
QUANTIFERON MITOGEN: 10 IU/ML
QUANTIFERON NIL TUBE: 0.02 IU/ML
QUANTIFERON TB1 TUBE: 0.1 IU/ML
QUANTIFERON TB2 TUBE: 0.03
SARS-COV-2 RNA RESP QL NAA+PROBE: NEGATIVE

## 2023-05-15 ENCOUNTER — LAB REQUISITION (OUTPATIENT)
Dept: LAB | Facility: CLINIC | Age: 68
End: 2023-05-15
Payer: MEDICARE

## 2023-05-15 DIAGNOSIS — U07.1 COVID-19: ICD-10-CM

## 2023-05-15 PROCEDURE — 87635 SARS-COV-2 COVID-19 AMP PRB: CPT

## 2023-05-16 LAB — SARS-COV-2 RNA RESP QL NAA+PROBE: NEGATIVE

## 2023-05-17 ENCOUNTER — TELEPHONE (OUTPATIENT)
Dept: PULMONOLOGY | Facility: OTHER | Age: 68
End: 2023-05-17

## 2023-05-24 ENCOUNTER — HOSPITAL ENCOUNTER (OUTPATIENT)
Dept: CT IMAGING | Facility: CLINIC | Age: 68
Discharge: HOME | End: 2023-05-24
Attending: INTERNAL MEDICINE | Admitting: INTERNAL MEDICINE
Payer: MEDICARE

## 2023-05-24 DIAGNOSIS — R91.8 PULMONARY NODULES: ICD-10-CM

## 2023-05-24 PROCEDURE — G1010 CDSM STANSON: HCPCS

## 2023-06-01 ENCOUNTER — TELEPHONE (OUTPATIENT)
Dept: PULMONOLOGY | Facility: OTHER | Age: 68
End: 2023-06-01

## 2023-06-12 ENCOUNTER — NURSING HOME VISIT (OUTPATIENT)
Dept: GERIATRICS | Facility: CLINIC | Age: 68
End: 2023-06-12
Payer: MEDICARE

## 2023-06-12 VITALS
TEMPERATURE: 97.5 F | RESPIRATION RATE: 18 BRPM | HEIGHT: 70 IN | HEART RATE: 66 BPM | OXYGEN SATURATION: 94 % | WEIGHT: 145.8 LBS | BODY MASS INDEX: 20.87 KG/M2 | DIASTOLIC BLOOD PRESSURE: 72 MMHG | SYSTOLIC BLOOD PRESSURE: 101 MMHG

## 2023-06-12 DIAGNOSIS — R41.89 COGNITIVE IMPAIRMENT: ICD-10-CM

## 2023-06-12 DIAGNOSIS — B19.20 HEPATITIS C VIRUS INFECTION WITHOUT HEPATIC COMA, UNSPECIFIED CHRONICITY: ICD-10-CM

## 2023-06-12 DIAGNOSIS — F10.20 ALCOHOL DEPENDENCE, UNCOMPLICATED (H): ICD-10-CM

## 2023-06-12 DIAGNOSIS — I67.9 CEREBRAL VASCULAR DISEASE: ICD-10-CM

## 2023-06-12 DIAGNOSIS — T17.908S ASPIRATION INTO AIRWAY, SEQUELA: ICD-10-CM

## 2023-06-12 DIAGNOSIS — R91.8 MASS OF MIDDLE LOBE OF RIGHT LUNG: Primary | ICD-10-CM

## 2023-06-12 DIAGNOSIS — F33.9 RECURRENT MAJOR DEPRESSIVE DISORDER, REMISSION STATUS UNSPECIFIED (H): ICD-10-CM

## 2023-06-12 PROCEDURE — 99309 SBSQ NF CARE MODERATE MDM 30: CPT | Performed by: INTERNAL MEDICINE

## 2023-06-14 NOTE — CONFIDENTIAL NOTE
DIAGNOSIS: Hepatitis C virus infection without hepatic coma, unspecified chronicity   Appt Date: 08.18.2023   NOTES STATUS DETAILS   OFFICE NOTE from referring provider Internal 03.20.2023 Demetris Machado MD   OFFICE NOTES from other specialists Internal 05.09.2023 Emeli Carmen APRN CNP   DISCHARGE SUMMARY from hospital Internal 03.20.2023 Demetris Machado MD   MEDICATION LIST Internal    LIVER BIOSPY (IF APPLICABLE)      PATHOLOGY REPORTS      IMAGING     ENDOSCOPY (IF AVAILABLE)     COLONOSCOPY (IF AVAILABLE)     ULTRASOUND LIVER     CT OF ABDOMEN     MRI OF LIVER     FIBROSCAN, US ELASTOGRAPHY, FIBROSIS SCAN, MR ELASTOGRAPHY     LABS     HEPATIC PANEL (LIVER PANEL)     BASIC METABOLIC PANEL Internal 04.11.2023   COMPLETE METABOLIC PANEL Internal 04.11.2023   COMPLETE BLOOD COUNT (CBC) Internal 04.11.2023   INTERNATIONAL NORMALIZED RATIO (INR)     HEPATITIS C ANTIBODY     HEPATITIS C VIRAL LOAD/PCR     HEPATITIS C GENOTYPE     HEPATITIS B SURFACE ANTIGEN     HEPATITIS B SURFACE ANTIBODY     HEPATITIS B DNA QUANT LEVEL     HEPATITIS B CORE ANTIBODY         
suspected

## 2023-06-19 NOTE — PROGRESS NOTES
"Matheus Nieves is a 68 year old male seen 2023 at [facility] where he has resided for 2 months (admit 2023) seen to follow up cerebral vascular disease with cognitive impairment.  Today pt is seen in his room, awake but just lying in bed.   Very flat and limited history, says no to pain or other symptoms.   Recommending referral to Hepatology for +Hep C, states he \"had a long time ago.\"     By chart review, pt had been at the [facility] awaiting LTC placement since 2022, but in March his stepdaughter took him out against medical advice.  A vulnerable adult case was filed and the [facility]  found the pt to be in an unsafe situation so he was sent to [facility] for placement.     His [facility] stay was 3/20 to 4/15 for failure to thrive and right lung abscess vs mass.    Admission labs showed leukocytosis and elevated bilirubin with Hepatitis C+   Imaging as below, seen by Pulmonology and treated with IV Zosyn for possible lung abscess.  Then repeat imaging at 6 weeks    Pt discharged to LTC on modified diet, is dependent for transfers and cares.         PMH:   Alcohol dependence  Cerebral vascular disease, h/o stroke  HTN  Vit D deficiency  Depression  RML lung abscess   Hepatitis C  Multiple decubiti    SH:  Wife Anamaria, stepdanavarro Clark and Tammy  Daughter Keri .   Sister Elisabeth  Pt lived with Amber for 8 years until 2022 hospitalization  Service-connected, [employer]   Worked in construction  Past smoker     ROS: limited by pt as above   SLUMS 15/30   Wt Readings from Last 5 Encounters:   23 66.1 kg (145 lb 12.8 oz)   23 67.4 kg (148 lb 9.6 oz)   23 66.2 kg (146 lb)   23 66.2 kg (146 lb)   23 66.9 kg (147 lb 6.4 oz)      EXAM: NAD  /72   Pulse 66   Temp 97.5  F (36.4  C)   Resp 18   Ht 1.778 m (5' 10\")   Wt 66.1 kg (145 lb 12.8 oz)   SpO2 94%   BMI 20.92 kg/m     Neck supple without adenopathy  Lungs with " decreased BS diffusely  Heart RRR s1s2   Abd soft, NT, no distention or guarding, +BS  Ext without edema, +sarcopenia  Neuro: +paucity of thought, limited history, generalized weakness     Psych: very flat     Labs reviewed: CBC and BMP unremarkable in April 2023     XR VIDEO SWALLOW WITH SLP OR OT 3/28/2023   Swallow study with Speech Pathology using multiple barium thicknesses. Aspiration was identified with thin liquid and mildly thick liquid. Inconsistent cough reflex present. Delay and early pour over noted. Minimal penetration with honey consistency, though no aspiration.    CT HEAD W/O CONTRAST   3/20/2023  INTRACRANIAL CONTENTS: No intracranial hemorrhage, extraaxial collection, or mass effect.  No CT evidence of cortical acute infarct. Lacunar infarcts are noted in the bilateral basal ganglia, thalami, and in the corona radiata. Moderate presumed chronic small vessel ischemic changes. Moderate generalized volume loss. No hydrocephalus.      CT CHEST WITHOUT CONTRAST  May 24, 2023  HISTORY: Lung abscess versus mass. Pulmonary nodules.  FINDINGS: The exam is limited by motion/respiratory artifacts, within this limitation, there is;  Chest/mediastinum: No cardiomegaly or significant pericardial  effusion. No significant mediastinal or hilar lymphadenopathy.  Moderate atherosclerotic vascular calcification of the coronary  arteries. Aneurysmal dilatation of the ascending thoracic aorta  measuring 4.2 cm in AP dimension (series 10 image 66).  Lung/pleura: No pleural effusion or pneumothorax. Bibasilar pulmonary  opacities could represent atelectasis versus infection. Significant  interval decrease in the size of the previously seen right middle lobe  nodule/mass, currently measures 9 mm previously measured 3.3 cm on 3/24/2023                                            IMPRESSION:    1. Significant interval decrease in the size of the previously seen right middle lobe lung mass, currently measures 9 mm,  previously  measured 3.3 cm on 3/24/2023 exam.  2. Moderate atherosclerotic vascular calcification of the coronary arteries.  3. Mild aneurysmal dilatation of the ascending thoracic aorta measuring 4.2 cm in diameter.       IMP/PLAN:   (R91.8) Mass of middle lobe of right lung   Comment: treated with a course of Zosyn for possible abscess, and decrease in size of mass noted on recent CT     Plan: follow up with Pulmonology as scheduled; needs daughter to take him to appointments.      (T17.908S) Aspiration into airway, sequela  Comment: seen by Speech Therapy and modified diet recommended     Plan: pt has declined any modification, choosing regular diet       (B19.20) Hepatitis C virus infection without hepatic coma, unspecified chronicity  Comment: found to be + with mildly elevated transaminases     Plan: follow LFTs   Hepatology referral recommended          (F10.20) Alcohol dependence, uncomplicated (H)  Comment: heavy drinking preceded June hospitalization   Plan: can discontinue thiamine at this point as had a long enough course     (F33.9) Recurrent major depressive disorder, remission status unspecified (H)  Comment: he continues to be very flat and disengaged   Plan: mirtazapine 45 mg/day   Melatonin 3 mg/HS for sleep   ACP referral if pt agreeable      (R41.89) Cognitive impairment  (R62.7) Adult failure to thrive  Comment: related to all above   Plan: LTC support for med admin, meals, activity      (I67.9) Cerebral vascular disease  Comment: multiple lacunar infarcts as above     Plan: remains on aspirin 81 mg/day and atorvastatin 10 mg/day for secondary prevention      Chiquita Kelly MD

## 2023-07-27 ENCOUNTER — DISCHARGE SUMMARY NURSING HOME (OUTPATIENT)
Dept: GERIATRICS | Facility: CLINIC | Age: 68
End: 2023-07-27
Payer: MEDICARE

## 2023-07-27 VITALS
HEIGHT: 70 IN | OXYGEN SATURATION: 93 % | HEART RATE: 76 BPM | SYSTOLIC BLOOD PRESSURE: 115 MMHG | RESPIRATION RATE: 18 BRPM | BODY MASS INDEX: 20.76 KG/M2 | TEMPERATURE: 97 F | DIASTOLIC BLOOD PRESSURE: 65 MMHG | WEIGHT: 145 LBS

## 2023-07-27 DIAGNOSIS — F10.11 HISTORY OF ALCOHOL ABUSE: ICD-10-CM

## 2023-07-27 DIAGNOSIS — L89.899 PRESSURE INJURY OF SKIN OF LEFT FOOT, UNSPECIFIED INJURY STAGE: ICD-10-CM

## 2023-07-27 DIAGNOSIS — I10 PRIMARY HYPERTENSION: ICD-10-CM

## 2023-07-27 DIAGNOSIS — B19.20 HEPATITIS C VIRUS INFECTION WITHOUT HEPATIC COMA, UNSPECIFIED CHRONICITY: ICD-10-CM

## 2023-07-27 DIAGNOSIS — R41.89 COGNITIVE IMPAIRMENT: ICD-10-CM

## 2023-07-27 DIAGNOSIS — F33.9 RECURRENT MAJOR DEPRESSIVE DISORDER, REMISSION STATUS UNSPECIFIED (H): ICD-10-CM

## 2023-07-27 DIAGNOSIS — R13.10 DYSPHAGIA, UNSPECIFIED TYPE: ICD-10-CM

## 2023-07-27 DIAGNOSIS — R91.8 MASS OF MIDDLE LOBE OF RIGHT LUNG: Primary | ICD-10-CM

## 2023-07-27 DIAGNOSIS — F51.02 ADJUSTMENT INSOMNIA: ICD-10-CM

## 2023-07-27 DIAGNOSIS — I67.9 CEREBRAL VASCULAR DISEASE: ICD-10-CM

## 2023-07-27 PROCEDURE — 99315 NF DSCHRG MGMT 30 MIN/LESS: CPT

## 2023-07-27 NOTE — PROGRESS NOTES
[facility] GERIATRICS DISCHARGE SUMMARY  PATIENT'S NAME: Matheus Nieves  YOB: 1955  MEDICAL RECORD NUMBER:  [id number]  Place of Service where encounter took place:  [facility]  () [[id number]]    PRIMARY CARE PROVIDER AND CLINIC RESPONSIBLE AFTER TRANSFER:   [facility]  [facility]    Transferring providers: JOHANNA Boone CNP, Chiquita Kelly MD  Recent Hospitalization/ED:  [facility] stay 3/20/23 to 4/15/23.  Date of SNF Admission: April 15, 2023  Date of SNF (anticipated) Discharge:  7/31/2023  Discharged to: [facility]  Cognitive Scores: SLUMS: 15/30  Physical Function: Pivot transfers and wheelchair for mobility  DME: No new DME needed    CODE STATUS/ADVANCE DIRECTIVES DISCUSSION:  Prior DNR/DNI  ALLERGIES: Patient has no known allergies.    NURSING FACILITY COURSE   By chart review, patient was brought in by [facility] officials as a [facility] in need [facility]. [facility] had been at [facility] since June of last year awaiting long term placement before his daugher removed him AMA. [facility] visited the home and was concerned about the home and daughter's ability to care for him; he was unable to ambulate and had had a fall. In ED, WBC elevated at 16.3. Bilirubin 1.6, Mag 1.6. He was positive for hepatitis C. CT of the head was negative. Initial plans were to monitor in observation and transfer to [facility]. CXR revealed a new rounded mass in the right lower lobe. CT concerning for malignancy vs abscess, pulmonology was consulted and recommend follow-up imaging in 6 weeks. Zosyn was added with concern for abscess. Blood cultures returned positive for gram positive cocci, varigene coag negative staph. He was treated with vanco but leukocytosis previously resolved, thought likely contaminant. Also with concern for aspiration, declined modified diet per SLP recommendations.  He was admitted to  the above facility for permanent placement. Repeat chest imaging on 5/24 demonstrated significant interval decrease in the right middle lobe lung mass and pulmonology did not recommend additional intervention, follow up PRN.     Summary of nursing facility stay:   (R91.8) Mass of middle lobe of right lung  (primary encounter diagnosis)  Comment: Treated with Zosyn with concern for abscess versus malignancy, significantly decreased on 5 repeat imaging 5/24.  Pulmonology has indicated no additional work-up necessary  Plan: Monitor respiratory status, follow-up with pulmonology as needed    (B19.20) Hepatitis C virus infection without hepatic coma, unspecified chronicity  Comment: Chronic, asymptomatic with mildly elevated LFTs  Plan: Monitor LFTs periodically, follow-up with hepatology as scheduled    (F33.9) Recurrent major depressive disorder, remission status unspecified (H)  Comment: Chronic, very flat, disengaged, low initiation  Plan: Continue mirtazapine 45 mg daily in the morning, ACP as needed    (R13.10) Dysphagia, unspecified type  Comment: Has declined SLP or modified diet.  No significant aspiration events while in facility  Plan: Continue regular diet, DD3 texture, thin liquids, no straws, meds whole with water    (L89.899) Pressure injury of skin of left foot, unspecified injury stage  Comment: Open area on the left lateral foot intermittently, pressure related.  Uses an air mattress.    Plan: Recommend continuing air mattress, routine skin monitoring per nursing, cleanse left lateral midfoot with normal saline or wound cleanser and cover with a foam dressing, change every 3 days or as needed for soiling    (I67.9) Cerebral vascular disease  Comment: Chronic, with multiple lacunar infarcts on imaging  Plan: Continue aspirin, statin for secondary prevention    (R41.89) Cognitive impairment  Comment: Chronic, low functional status, slums 15/30 in April.  Elopement risk per nursing.  Plan: SNF for assist  "with ADLs, medication management, meals, activities      (F10.11) History of alcohol abuse  Comment: Chronic, longstanding by history.  No access to alcohol in facility but reportedly stole a bottle of liquor from his daughter while on an outing.  Completed folic acid and thiamine  Plan: Continue to encourage cessation.    (I10) Primary hypertension  Comment: Chronic, currently well controlled off of antihypertensives.  Avoid hypotension due to age and frailty, risk for falls and complications  BP Readings from Last 3 Encounters:   07/27/23 115/65   06/12/23 101/72   05/09/23 120/78   Plan: Monitor BP with adjustments as needed    (F51.02) Adjustment insomnia  Comment: Chronic, sleeping well  Plan: Monitor sleep habits, continue melatonin nightly      Discharge Medications:  MED REC REQUIRED  Post Medication Reconciliation Status:  Discharge medications reconciled, continue medications without change       Current Outpatient Medications   Medication Sig Dispense Refill    aspirin 81 MG EC tablet Take 81 mg by mouth daily      atorvastatin (LIPITOR) 10 MG tablet Take 10 mg by mouth At Bedtime      melatonin 3 MG tablet Take 1 mg by mouth At Bedtime      miconazole (MICATIN) 2 % external powder Apply topically 2 times daily Apply to bilateral groin and scrotum.      mirtazapine (REMERON) 45 MG tablet Take 45 mg by mouth daily            Controlled medications:   not applicable/none     Past Medical History: No past medical history on file.  Physical Exam:   Vitals: /65   Pulse 76   Temp 97  F (36.1  C)   Resp 18   Ht 1.778 m (5' 10\")   Wt 65.8 kg (145 lb)   SpO2 93%   BMI 20.81 kg/m    BMI: Body mass index is 20.81 kg/m .  GENERAL APPEARANCE:  Alert, in no distress  RESP:  respiratory effort and palpation of chest normal, lungs clear to auscultation , no respiratory distress  CV:  Palpation and auscultation of heart done , regular rate and rhythm, no murmur, rub, or gallop, no edema  ABDOMEN:  normal " bowel sounds, soft, nontender, no hepatosplenomegaly or other masses  M/S:   Gait and station abnormal transfers with assist, wheelchair for mobility  SKIN:  Inspection of skin and subcutaneous tissue baseline, Palpation of skin and subcutaneous tissue baseline  NEURO:   Moves all extremities, follows simple commands  PSYCH:  oriented to self, memory impaired , flat affect      SNF labs: Labs done in SNF are in [facility]. Please refer to them using [facility]/Care Everywhere. and Recent labs in [facility] reviewed by me today.     DISCHARGE PLAN:  Follow up labs: No labs orders/due  Medical Follow Up:      Follow up with primary care provider in 1-2 weeks  Current [place] scheduled appointments: Follow up with Dr. Gomez Hepatology August 18 labs 9:30 and appointment immediately following 10:30 at [address] [place] [address]   Discharge Services: No therapy or home care recommended.   Discharge Instructions:   Continue to follow your diet:  regular DD3, thin liquids, no straws.   Expedite nutritional supplement recommended 1 times a day.   Wound care leanse left lateral midfoot with normal saline or wound cleanser and cover with a foam dressing, change every 3 days or as needed for soiling.   Notify PCP if you have a fever greater than 100.5 degrees.   Driving is not recommended until cleared by OT to resume.   24-hour supervision is recommended for safety.     TOTAL DISCHARGE TIME:   Less than or equal to 30 minutes  Electronically signed by:  JOHANNA Boone CNP

## 2023-07-27 NOTE — PATIENT INSTRUCTIONS
Matheus Nieves  YOB: 1955                                 SSN: [id number]  [facility] MRN#:[id number]  Medical Center Admission Date: 4/15/23 Discharge Date: 7/31/2023   PHYSICIAN's DISCHARGE SUMMARY / ORDER SHEET  1. Discharge to: [facility]  2. Medications:      Current Outpatient Medications   Medication Sig Dispense Refill    aspirin 81 MG EC tablet Take 81 mg by mouth daily      atorvastatin (LIPITOR) 10 MG tablet Take 10 mg by mouth At Bedtime      melatonin 3 MG tablet Take 1 mg by mouth At Bedtime      miconazole (MICATIN) 2 % external powder Apply topically 2 times daily Apply to bilateral groin and scrotum.      mirtazapine (REMERON) 45 MG tablet Take 45 mg by mouth daily        DISCHARGE PLAN:  Follow up labs: No labs orders/due  Medical Follow Up:      Follow up with primary care provider in 1-2 weeks  [facility] scheduled appointments: Follow up with Dr. Gomez Hepatology August 18 labs 9:30 and appointment immediately following 10:30 at [address] [place] [address]   Discharge Services: No therapy or home care recommended.   Discharge Instructions:   Continue to follow your diet:  regular DD3, thin liquids, no straws.   Expedite nutritional supplement recommended 1 times a day.   Wound care leanse left lateral midfoot with normal saline or wound cleanser and cover with a foam dressing, change every 3 days or as needed for soiling.   Notify PCP if you have a fever greater than 100.5 degrees.   Driving is not recommended until cleared by OT to resume.   24-hour supervision is recommended for safety.    Discharge patient to home with current medications and treatments  Discharge Home with Home care as above  - Nursing call in 30 days supply of needed meds to pharmacy of choice upon discharge  - please send home with original of these discharge instructions and copy for chart.       ______________________________  Electronically signed:  JOHANNA Boone CNP   Department of  [facility]                   7/27/2023

## 2023-08-10 DIAGNOSIS — B18.2 CHRONIC HEPATITIS C VIRUS INFECTION (H): Primary | ICD-10-CM

## 2023-08-10 DIAGNOSIS — Z11.59 ENCOUNTER FOR SCREENING FOR OTHER VIRAL DISEASES: ICD-10-CM

## 2023-08-18 ENCOUNTER — PRE VISIT (OUTPATIENT)
Dept: GASTROENTEROLOGY | Facility: CLINIC | Age: 68
End: 2023-08-18

## 2023-08-31 ENCOUNTER — NURSING HOME VISIT (OUTPATIENT)
Dept: GERIATRICS | Facility: CLINIC | Age: 68
End: 2023-08-31
Payer: MEDICARE

## 2023-08-31 DIAGNOSIS — R13.10 DYSPHAGIA, UNSPECIFIED TYPE: ICD-10-CM

## 2023-08-31 DIAGNOSIS — I10 ESSENTIAL HYPERTENSION: ICD-10-CM

## 2023-08-31 DIAGNOSIS — I67.9 CEREBRAL VASCULAR DISEASE: Primary | ICD-10-CM

## 2023-08-31 DIAGNOSIS — R91.8 MASS OF MIDDLE LOBE OF RIGHT LUNG: ICD-10-CM

## 2023-08-31 PROCEDURE — 99305 1ST NF CARE MODERATE MDM 35: CPT | Performed by: FAMILY MEDICINE

## 2023-09-06 ENCOUNTER — NURSING HOME VISIT (OUTPATIENT)
Dept: GERIATRICS | Facility: CLINIC | Age: 68
End: 2023-09-06
Payer: MEDICARE

## 2023-09-06 VITALS
BODY MASS INDEX: 28.99 KG/M2 | HEART RATE: 76 BPM | WEIGHT: 184.7 LBS | DIASTOLIC BLOOD PRESSURE: 72 MMHG | HEIGHT: 67 IN | RESPIRATION RATE: 18 BRPM | OXYGEN SATURATION: 94 % | SYSTOLIC BLOOD PRESSURE: 146 MMHG | TEMPERATURE: 98.9 F

## 2023-09-06 DIAGNOSIS — R91.8 MASS OF MIDDLE LOBE OF RIGHT LUNG: ICD-10-CM

## 2023-09-06 DIAGNOSIS — I67.9 CEREBRAL VASCULAR DISEASE: ICD-10-CM

## 2023-09-06 DIAGNOSIS — F33.9 RECURRENT MAJOR DEPRESSIVE DISORDER, REMISSION STATUS UNSPECIFIED (H): ICD-10-CM

## 2023-09-06 DIAGNOSIS — G81.91 RIGHT HEMIPARESIS (H): Primary | ICD-10-CM

## 2023-09-06 DIAGNOSIS — R13.10 DYSPHAGIA, UNSPECIFIED TYPE: ICD-10-CM

## 2023-09-06 PROCEDURE — 99310 SBSQ NF CARE HIGH MDM 45: CPT | Performed by: NURSE PRACTITIONER

## 2023-09-06 RX ORDER — DIVALPROEX SODIUM 250 MG/1
250 TABLET, DELAYED RELEASE ORAL 2 TIMES DAILY
COMMUNITY
End: 2023-10-23

## 2023-09-06 RX ORDER — ALLOPURINOL 100 MG/1
200 TABLET ORAL DAILY
COMMUNITY
End: 2023-10-23

## 2023-09-06 RX ORDER — TRAVOPROST OPHTHALMIC SOLUTION 0.04 MG/ML
1 SOLUTION OPHTHALMIC AT BEDTIME
COMMUNITY
End: 2023-10-23

## 2023-09-06 RX ORDER — SIMVASTATIN 10 MG
15 TABLET ORAL AT BEDTIME
COMMUNITY
End: 2023-10-23

## 2023-09-06 RX ORDER — NYSTATIN 100000 [USP'U]/G
POWDER TOPICAL 2 TIMES DAILY
COMMUNITY
End: 2023-10-23

## 2023-09-06 RX ORDER — ACETAMINOPHEN 500 MG
500-1000 TABLET ORAL EVERY 6 HOURS PRN
COMMUNITY
End: 2023-10-23

## 2023-09-06 NOTE — PROGRESS NOTES
[facility] GERIATRICS  Chief Complaint   Patient presents with    USP Regulatory     [facility] Medical Record Number:  [id number]  Place of Service where encounter took place:  [facility] () [[id number]]    HPI:  Patient laying in bed, non-verbal with writer. Per staff, intermittently refuses cares. Has audible wet cough with diminished lungs. No open areas, recent falls or behaviors. Patient was previously living with daughter, but removed d/t inability to care for him, a fall and inability to walk. During hospitalization, patient was found to have elevated WBC treated with IV antibiotics, a new lung mass concerning for malignancy, and hepatitis C. Transferred to LT for further care. HPI difficult to obtain 2/2 aphasia.     .  BP Readings from Last 3 Encounters:   09/06/23 (!) 146/72   07/27/23 115/65   06/12/23 101/72     Pulse Readings from Last 4 Encounters:   09/06/23 76   07/27/23 76   06/12/23 66   05/09/23 73     Wt Readings from Last 4 Encounters:   09/06/23 83.8 kg (184 lb 11.2 oz)   07/27/23 65.8 kg (145 lb)   06/12/23 66.1 kg (145 lb 12.8 oz)   05/09/23 67.4 kg (148 lb 9.6 oz)     ALLERGIES:Patient has no known allergies.  PAST MEDICAL HISTORY: History reviewed. No pertinent past medical history.  PAST SURGICAL HISTORY:   has no past surgical history on file.  FAMILY HISTORY: family history includes Hypertension in his mother.  SOCIAL HISTORY:  reports that he quit smoking about 9 years ago. He has never used smokeless tobacco. He reports current alcohol use. He reports current drug use. Frequency: 1.00 time per week. Drug: Marijuana.    MEDICATIONS:  MED REC REQUIRED  Post Medication Reconciliation Status: patient was not discharged from an inpatient facility or TCU         Review of your medicines            Accurate as of September 6, 2023  1:55 PM. If you have any questions, ask your nurse or doctor.                CONTINUE these medicines which have NOT CHANGED         Dose / Directions   acetaminophen 500 MG tablet  Commonly known as: TYLENOL      Dose: 500-1,000 mg  Take 500-1,000 mg by mouth every 6 hours as needed for mild pain  Refills: 0     allopurinol 100 MG tablet  Commonly known as: ZYLOPRIM      Dose: 200 mg  Take 200 mg by mouth daily  Refills: 0     aspirin 81 MG EC tablet      Dose: 81 mg  Take 81 mg by mouth daily  Refills: 0     atorvastatin 10 MG tablet  Commonly known as: LIPITOR      Dose: 10 mg  Take 10 mg by mouth At Bedtime  Refills: 0     Blood Glucose Monitor Software Karla      2 times daily  Refills: 0     divalproex sodium delayed-release 250 MG DR tablet  Commonly known as: DEPAKOTE      Dose: 250 mg  Take 250 mg by mouth 2 times daily  Refills: 0     ipratropium-albuterol  MCG/ACT inhaler  Commonly known as: COMBIVENT RESPIMAT      Dose: 1 puff  Inhale 1 puff into the lungs 4 times daily  Refills: 0     melatonin 3 MG tablet      Dose: 1 mg  Take 1 mg by mouth At Bedtime  Refills: 0     metFORMIN 500 MG tablet  Commonly known as: GLUCOPHAGE      Dose: 500 mg  Take 500 mg by mouth 2 times daily (with meals)  Refills: 0     miconazole 2 % external powder  Commonly known as: MICATIN  Used for: Candida infection      Apply topically 2 times daily Apply to bilateral groin and scrotum.  Refills: 0     mirtazapine 45 MG tablet  Commonly known as: REMERON      Dose: 45 mg  Take 45 mg by mouth daily  Refills: 0     nystatin 201424 UNIT/GM external powder  Commonly known as: MYCOSTATIN      Apply topically 2 times daily  Refills: 0     simvastatin 10 MG tablet  Commonly known as: ZOCOR      Dose: 15 mg  Take 15 mg by mouth At Bedtime  Refills: 0     travoprost BAK FREE 0.004 % ophthalmic solution  Commonly known as: TRAVATAN Z      Dose: 1 drop  Place 1 drop into both eyes At Bedtime  Refills: 0               Case Management:  I have reviewed the care plan and MDS and do agree with the plan. Patient's desire to return to the community is not present.  "Information reviewed:  Medications, vital signs, orders, and nursing notes.    ROS:  4 point ROS including Respiratory, CV, GI and , other than that noted in the HPI,  is negative    Vitals:  BP (!) 146/72   Pulse 76   Temp 98.9  F (37.2  C)   Resp 18   Ht 1.702 m (5' 7\")   Wt 83.8 kg (184 lb 11.2 oz)   SpO2 94%   BMI 28.93 kg/m    Body mass index is 28.93 kg/m .  Exam:  GENERAL APPEARANCE:  Alert  ENT:  Mouth and posterior oropharynx normal, moist mucous membranes, [place]  RESP:  respiratory effort and palpation of chest normal, lungs clear to auscultation   CV:  Palpation and auscultation of heart done , regular rate and rhythm, no murmur, rub, or gallop  ABDOMEN:  normal bowel sounds, soft, nontender, no hepatosplenomegaly or other masses  M/S:   Gait and station normal  Digits and nails normal  SKIN:  Inspection of skin and subcutaneous tissue baseline, Palpation of skin and subcutaneous tissue baseline  NEURO:   Cranial nerves 2-12 are normal tested and grossly at patient's baseline  PSYCH:  oriented X 3    Lab/Diagnostic data:   Recent labs in [facility] reviewed by me today.     ASSESSMENT/PLAN  (G81.91) Right hemiparesis (H)  (primary encounter diagnosis)  (I67.9) Cerebral vascular disease   Multiple infarcts noted on heat CT.   -Continue LTC support with medication administration, meals and safety.     (F33.9) Depression  Comment:  Longstanding, continue POC.  -Monitor and update NP as indicated.     (R91.8) Right lung lobe mass  Comment: Improved, surveillance at this time     (R13.10) Dysphagia  Comment: Has refused speech and modified diet.   -Wet cough with dysphagia.   -Chest x-ray 2 view      Electronically signed by:  Janay Buenrostro NP                "

## 2023-09-06 NOTE — LETTER
9/6/2023        RE: Matheus Nieves  [address]  [facility] [address]        M [facility] GERIATRICS  Chief Complaint   Patient presents with     senior living [facility] Record Number:  [id number]  Place of Service where encounter took place:  [facility] () [[id number]]    HPI:    Matheus Nieves  is 68 year old (1955), who is being seen today for a federally mandated E/M visit. Today's concerns are:  Patient eating at lunch table, ate 100% of meal. Pleasant with writer and cooperative with exam. Recently graduated from hospice care and now looking forward to working with physical therapy to get stronger. Denies pain, SOB or light headedness. Patient and staff have no concerns at this time.   .  BP Readings from Last 3 Encounters:   09/06/23 (!) 146/72   07/27/23 115/65   06/12/23 101/72     Pulse Readings from Last 4 Encounters:   09/06/23 76   07/27/23 76   06/12/23 66   05/09/23 73     Wt Readings from Last 4 Encounters:   09/06/23 83.8 kg (184 lb 11.2 oz)   07/27/23 65.8 kg (145 lb)   06/12/23 66.1 kg (145 lb 12.8 oz)   05/09/23 67.4 kg (148 lb 9.6 oz)     ALLERGIES:Patient has no known allergies.  PAST MEDICAL HISTORY: History reviewed. No pertinent past medical history.  PAST SURGICAL HISTORY:   has no past surgical history on file.  FAMILY HISTORY: family history includes Hypertension in his mother.  SOCIAL HISTORY:  reports that he quit smoking about 9 years ago. He has never used smokeless tobacco. He reports current alcohol use. He reports current drug use. Frequency: 1.00 time per week. Drug: Marijuana.    MEDICATIONS:  MED REC REQUIRED  Post Medication Reconciliation Status: patient was not discharged from an inpatient facility or TCU         Review of your medicines            Accurate as of September 6, 2023  1:55 PM. If you have any questions, ask your nurse or doctor.                CONTINUE these medicines which have NOT CHANGED        Dose /  Directions   acetaminophen 500 MG tablet  Commonly known as: TYLENOL      Dose: 500-1,000 mg  Take 500-1,000 mg by mouth every 6 hours as needed for mild pain  Refills: 0     allopurinol 100 MG tablet  Commonly known as: ZYLOPRIM      Dose: 200 mg  Take 200 mg by mouth daily  Refills: 0     aspirin 81 MG EC tablet      Dose: 81 mg  Take 81 mg by mouth daily  Refills: 0     atorvastatin 10 MG tablet  Commonly known as: LIPITOR      Dose: 10 mg  Take 10 mg by mouth At Bedtime  Refills: 0     Blood Glucose Monitor Software Karla      2 times daily  Refills: 0     divalproex sodium delayed-release 250 MG DR tablet  Commonly known as: DEPAKOTE      Dose: 250 mg  Take 250 mg by mouth 2 times daily  Refills: 0     ipratropium-albuterol  MCG/ACT inhaler  Commonly known as: COMBIVENT RESPIMAT      Dose: 1 puff  Inhale 1 puff into the lungs 4 times daily  Refills: 0     melatonin 3 MG tablet      Dose: 1 mg  Take 1 mg by mouth At Bedtime  Refills: 0     metFORMIN 500 MG tablet  Commonly known as: GLUCOPHAGE      Dose: 500 mg  Take 500 mg by mouth 2 times daily (with meals)  Refills: 0     miconazole 2 % external powder  Commonly known as: MICATIN  Used for: Candida infection      Apply topically 2 times daily Apply to bilateral groin and scrotum.  Refills: 0     mirtazapine 45 MG tablet  Commonly known as: REMERON      Dose: 45 mg  Take 45 mg by mouth daily  Refills: 0     nystatin 551185 UNIT/GM external powder  Commonly known as: MYCOSTATIN      Apply topically 2 times daily  Refills: 0     simvastatin 10 MG tablet  Commonly known as: ZOCOR      Dose: 15 mg  Take 15 mg by mouth At Bedtime  Refills: 0     travoprost BAK FREE 0.004 % ophthalmic solution  Commonly known as: TRAVATAN Z      Dose: 1 drop  Place 1 drop into both eyes At Bedtime  Refills: 0               Case Management:  I have reviewed the care plan and MDS and do agree with the plan. Patient's desire to return to the community is not present. Information  "reviewed:  Medications, vital signs, orders, and nursing notes.    ROS:  4 point ROS including Respiratory, CV, GI and , other than that noted in the HPI,  is negative    Vitals:  BP (!) 146/72   Pulse 76   Temp 98.9  F (37.2  C)   Resp 18   Ht 1.702 m (5' 7\")   Wt 83.8 kg (184 lb 11.2 oz)   SpO2 94%   BMI 28.93 kg/m    Body mass index is 28.93 kg/m .  Exam:  GENERAL APPEARANCE:  Alert  ENT:  Mouth and posterior oropharynx normal, moist mucous membranes, [place]  RESP:  respiratory effort and palpation of chest normal, lungs clear to auscultation   CV:  Palpation and auscultation of heart done , regular rate and rhythm, no murmur, rub, or gallop  ABDOMEN:  normal bowel sounds, soft, nontender, no hepatosplenomegaly or other masses  M/S:   Gait and station normal  Digits and nails normal  SKIN:  Inspection of skin and subcutaneous tissue baseline, Palpation of skin and subcutaneous tissue baseline  NEURO:   Cranial nerves 2-12 are normal tested and grossly at patient's baseline  PSYCH:  oriented X 3    Lab/Diagnostic data:   Recent labs in [facility] reviewed by me today.     ASSESSMENT/PLAN  (G81.91) Right hemiparesis (H)  (primary encounter diagnosis)  (R53.81) Physical deconditioning  Comment: Per history. Graduated hospice care and now needing physical and occupational therapy consults for strengthening.   -Continue LTC support with medication administration, meals and safety.     (F33.9) Recurrent major depressive disorder, remission status unspecified (H)  Comment: Longstanding.  -Stable  -Continue venlafaxine daily.   The current medical regimen is effective; continue present plan and medications.      Electronically signed by:  Janay Buenrostro NP                    Sincerely,        Janay Buenrostro NP      "

## 2023-09-17 PROBLEM — G81.91 RIGHT HEMIPARESIS (H): Status: ACTIVE | Noted: 2023-09-17

## 2023-10-09 ENCOUNTER — NURSING HOME VISIT (OUTPATIENT)
Dept: GERIATRICS | Facility: CLINIC | Age: 68
End: 2023-10-09
Payer: MEDICARE

## 2023-10-09 VITALS
HEART RATE: 61 BPM | BODY MASS INDEX: 19.96 KG/M2 | TEMPERATURE: 98 F | SYSTOLIC BLOOD PRESSURE: 129 MMHG | DIASTOLIC BLOOD PRESSURE: 85 MMHG | HEIGHT: 70 IN | WEIGHT: 139.4 LBS | RESPIRATION RATE: 16 BRPM | OXYGEN SATURATION: 96 %

## 2023-10-09 DIAGNOSIS — G81.91 RIGHT HEMIPARESIS (H): Primary | ICD-10-CM

## 2023-10-09 DIAGNOSIS — R13.10 DYSPHAGIA, UNSPECIFIED TYPE: ICD-10-CM

## 2023-10-09 DIAGNOSIS — F10.11 HISTORY OF ALCOHOL ABUSE: ICD-10-CM

## 2023-10-09 DIAGNOSIS — I10 ESSENTIAL HYPERTENSION: ICD-10-CM

## 2023-10-09 PROCEDURE — 99309 SBSQ NF CARE MODERATE MDM 30: CPT | Performed by: FAMILY MEDICINE

## 2023-10-09 NOTE — LETTER
"    10/9/2023        RE: Matheus Nieves  [address]  [place] [address]          [facility] GERIATRICS  [place] Medical Record Number:  [id number]  Place of Service where encounter took place: [facility] () [[id number]]  CODE STATUS:   DNR / DNI    Chief Complaint/Reason for Visit:  Chief Complaint   Patient presents with     [facility]     LTC 10/9/2023.       HPI:    He moved here from a different LT facility that he was admitted to after March 2023 hospitalization for lung abscess, concern for malignancy, aspiration, hx of prior stroke, needed placement, concern for vulnerable adult.     He is seen today for regulatory visit in LTC. Not very interactive but shakes or nods responses. States no pain. Has no difficulty breathing. Is comfortable. Chart reviewed, meds, nurses notes. No medication changes since my last visit. He has not been ill. No new concerns per nursing.       PAST MEDICAL HISTORY:  Past Medical History:   Diagnosis Date     Cerebral infarction (H)      Hypertension        MEDICATIONS:  Current Outpatient Medications   Medication Sig Dispense Refill     atorvastatin (LIPITOR) 10 MG tablet Take 10 mg by mouth daily       melatonin 1 MG TABS tablet Take 1 mg by mouth at bedtime       mirtazapine (REMERON) 45 MG tablet Take 45 mg by mouth at bedtime       polyethylene glycol (MIRALAX) 17 g packet Take 1 packet by mouth daily       senna-docusate (SENOKOT-S/PERICOLACE) 8.6-50 MG tablet Take 1 tablet by mouth daily       aspirin 81 MG EC tablet Take 81 mg by mouth daily           PHYSICAL EXAM:  General: Patient is alert male, no distress.   Vitals: /85   Pulse 61   Temp 98  F (36.7  C)   Resp 16   Ht 1.778 m (5' 10\")   Wt 63.2 kg (139 lb 6.4 oz)   SpO2 96%   BMI 20.00 kg/m    HEENT: Head is NCAT. Eyes show no injection or icterus. Nares negative. Oropharynx well hydrated.  Neck: No JVD.  Lungs: Non labored respirations.   Abdomen: Soft.  : " Deferred.  Extremities: No edema is noted.  Musculoskeletal: Right sided weakness.   Skin: No rashes noted.   Psych: Difficult to assess.       LABS/DIAGNOSTIC DATA:  Component      Latest Ref Rng 4/11/2023  11:15 AM   Sodium      136 - 145 mmol/L 141    Potassium      3.5 - 5.0 mmol/L 4.5    Chloride      98 - 107 mmol/L 106    Carbon Dioxide      22 - 31 mmol/L 28    Anion Gap      5 - 18 mmol/L 7    Urea Nitrogen      8 - 22 mg/dL 18    Creatinine      0.70 - 1.30 mg/dL 0.82    Calcium      8.5 - 10.5 mg/dL 8.9    Glucose      70 - 125 mg/dL 98    GFR Estimate      >60 mL/min/1.73m2 >90      Component      Latest Ref Rng 4/11/2023  11:15 AM   WBC      4.0 - 11.0 10e3/uL 4.3    RBC Count      4.40 - 5.90 10e6/uL 4.54    Hemoglobin      13.3 - 17.7 g/dL 13.9    Hematocrit      40.0 - 53.0 % 42.2    MCV      78 - 100 fL 93    MCH      26.5 - 33.0 pg 30.6    MCHC      31.5 - 36.5 g/dL 32.9    RDW      10.0 - 15.0 % 14.2    Platelet Count      150 - 450 10e3/uL 194            ASSESSMENT/PLAN:  1. Hx of stroke. Cerebrovascular disease. On aspirin, stain. Right sided weakness.   2. Dysphagia. Altered textures though ok with thin liquids.   3. HTN. Not on BP meds, satisfactory.   4. Hx ETOH abuse. Resides in LTC.       Overall he appear stable, continue care plan.         Electronically signed by: Ashley Lorenzo MD             Sincerely,        Ashley Lorenzo MD

## 2023-10-23 RX ORDER — AMOXICILLIN 250 MG
1 CAPSULE ORAL DAILY
COMMUNITY

## 2023-10-23 RX ORDER — ATORVASTATIN CALCIUM 10 MG/1
10 TABLET, FILM COATED ORAL DAILY
COMMUNITY

## 2023-10-23 RX ORDER — POLYETHYLENE GLYCOL 3350 17 G/17G
1 POWDER, FOR SOLUTION ORAL DAILY
COMMUNITY

## 2023-10-23 RX ORDER — MIRTAZAPINE 45 MG/1
45 TABLET, FILM COATED ORAL AT BEDTIME
COMMUNITY

## 2023-10-23 NOTE — PROGRESS NOTES
"[facility] GERIATRICS  [facility] Medical Record Number:  [id number]  Place of Service where encounter took place: [facility] () [[id number]]  CODE STATUS:   DNR / DNI    Chief Complaint/Reason for Visit:  Chief Complaint   Patient presents with    [facility] Regulatory     LTC 10/9/2023.       HPI:    He moved here from a different LT facility that he was admitted to after March 2023 hospitalization for lung abscess, concern for malignancy, aspiration, hx of prior stroke, needed placement, concern for vulnerable adult.     He is seen today for regulatory visit in LT. Not very interactive but shakes or nods responses. States no pain. Has no difficulty breathing. Is comfortable. Chart reviewed, meds, nurses notes. No medication changes since my last visit. He has not been ill. No new concerns per nursing.       PAST MEDICAL HISTORY:  Past Medical History:   Diagnosis Date    Cerebral infarction (H)     Hypertension        MEDICATIONS:  Current Outpatient Medications   Medication Sig Dispense Refill    atorvastatin (LIPITOR) 10 MG tablet Take 10 mg by mouth daily      melatonin 1 MG TABS tablet Take 1 mg by mouth at bedtime      mirtazapine (REMERON) 45 MG tablet Take 45 mg by mouth at bedtime      polyethylene glycol (MIRALAX) 17 g packet Take 1 packet by mouth daily      senna-docusate (SENOKOT-S/PERICOLACE) 8.6-50 MG tablet Take 1 tablet by mouth daily      aspirin 81 MG EC tablet Take 81 mg by mouth daily           PHYSICAL EXAM:  General: Patient is alert male, no distress.   Vitals: /85   Pulse 61   Temp 98  F (36.7  C)   Resp 16   Ht 1.778 m (5' 10\")   Wt 63.2 kg (139 lb 6.4 oz)   SpO2 96%   BMI 20.00 kg/m    HEENT: Head is NCAT. Eyes show no injection or icterus. Nares negative. Oropharynx well hydrated.  Neck: No JVD.  Lungs: Non labored respirations.   Abdomen: Soft.  : Deferred.  Extremities: No edema is noted.  Musculoskeletal: Right sided weakness.   Skin: No rashes " noted.   Psych: Difficult to assess.       LABS/DIAGNOSTIC DATA:  Component      Latest Ref Rng 4/11/2023  11:15 AM   Sodium      136 - 145 mmol/L 141    Potassium      3.5 - 5.0 mmol/L 4.5    Chloride      98 - 107 mmol/L 106    Carbon Dioxide      22 - 31 mmol/L 28    Anion Gap      5 - 18 mmol/L 7    Urea Nitrogen      8 - 22 mg/dL 18    Creatinine      0.70 - 1.30 mg/dL 0.82    Calcium      8.5 - 10.5 mg/dL 8.9    Glucose      70 - 125 mg/dL 98    GFR Estimate      >60 mL/min/1.73m2 >90      Component      Latest Ref Rng 4/11/2023  11:15 AM   WBC      4.0 - 11.0 10e3/uL 4.3    RBC Count      4.40 - 5.90 10e6/uL 4.54    Hemoglobin      13.3 - 17.7 g/dL 13.9    Hematocrit      40.0 - 53.0 % 42.2    MCV      78 - 100 fL 93    MCH      26.5 - 33.0 pg 30.6    MCHC      31.5 - 36.5 g/dL 32.9    RDW      10.0 - 15.0 % 14.2    Platelet Count      150 - 450 10e3/uL 194            ASSESSMENT/PLAN:  1. Hx of stroke. Cerebrovascular disease. On aspirin, stain. Right sided weakness.   2. Dysphagia. Altered textures though ok with thin liquids.   3. HTN. Not on BP meds, satisfactory.   4. Hx ETOH abuse. Resides in LTC.       Overall he appear stable, continue care plan.         Electronically signed by: Ashley Lorenzo MD

## 2024-01-01 VITALS
BODY MASS INDEX: 20.19 KG/M2 | DIASTOLIC BLOOD PRESSURE: 85 MMHG | WEIGHT: 141 LBS | SYSTOLIC BLOOD PRESSURE: 129 MMHG | OXYGEN SATURATION: 96 % | RESPIRATION RATE: 16 BRPM | HEART RATE: 61 BPM | TEMPERATURE: 98 F | HEIGHT: 70 IN

## 2024-01-02 ENCOUNTER — NURSING HOME VISIT (OUTPATIENT)
Dept: GERIATRICS | Facility: CLINIC | Age: 69
End: 2024-01-02
Payer: MEDICARE

## 2024-01-02 DIAGNOSIS — R62.7 FAILURE TO THRIVE IN ADULT: ICD-10-CM

## 2024-01-02 DIAGNOSIS — I10 HYPERTENSION, UNSPECIFIED TYPE: ICD-10-CM

## 2024-01-02 DIAGNOSIS — J85.2 ABSCESS OF MIDDLE LOBE OF RIGHT LUNG WITHOUT PNEUMONIA (H): ICD-10-CM

## 2024-01-02 DIAGNOSIS — F10.20 ALCOHOL DEPENDENCE, UNCOMPLICATED (H): ICD-10-CM

## 2024-01-02 DIAGNOSIS — K59.01 SLOW TRANSIT CONSTIPATION: ICD-10-CM

## 2024-01-02 DIAGNOSIS — R47.1 DYSARTHRIA AND ANARTHRIA: ICD-10-CM

## 2024-01-02 DIAGNOSIS — G81.91 RIGHT HEMIPARESIS (H): Primary | ICD-10-CM

## 2024-01-02 PROCEDURE — 99309 SBSQ NF CARE MODERATE MDM 30: CPT | Performed by: NURSE PRACTITIONER

## 2024-01-02 NOTE — LETTER
"    1/2/2024        RE: Matheus Nieves  [address]  [facility] [address]        M [facility] GERIATRICS    Chief Complaint   Patient presents with     RECHECK     HPI:  Matheus Nieves is a 68 year old  (1955), who is being seen today for an episodic care visit at: [facility] () [[id number]].     This is a 67 yo male with PMHx for CVA, lung abscess, constipation who has no issues today. Of note has been in LTC since 3/2023.    Allergies, and PMH/PSH reviewed in EPIC today.  REVIEW OF SYSTEMS:  4 point ROS including Respiratory, CV, GI and , other than that noted in the HPI,  is negative    Objective:   /85   Pulse 61   Temp 98  F (36.7  C)   Resp 16   Ht 1.778 m (5' 10\")   Wt 64 kg (141 lb)   SpO2 96%   BMI 20.23 kg/m    GENERAL APPEARANCE:  Alert, in no distress, cooperative, denies pain  RESP:  lungs clear to auscultation , no respiratory distress  CV:  regular rate and rhythm, no murmur, rub, or gallop, no edema  PSYCH:  oriented to 2    Most Recent 3 CBC's:  Recent Labs   Lab Test 04/11/23  1115 03/27/23  0746 03/26/23  0825   WBC 4.3 9.2 9.0   HGB 13.9 12.3* 12.9*   MCV 93 92 94    228 221     Most Recent 3 BMP's:  Recent Labs   Lab Test 04/11/23  1115 03/27/23  0746 03/26/23  0825    139 141   POTASSIUM 4.5 4.1 4.4   CHLORIDE 106 108* 108*   CO2 28 26 26   BUN 18 13 16   CR 0.82 0.80 0.84   ANIONGAP 7 5 7   BERNARDINO 8.9 8.9 8.7   GLC 98 90 71     Most Recent TSH and T4:  Recent Labs   Lab Test 03/20/23  1739   TSH 1.59     Most Recent Hemoglobin A1c:No lab results found.    Assessment/Plan:    (G81.91) Right hemiparesis (H)  (primary encounter diagnosis)  (R47.1) Dysarthria and anarthria  Continue statin and ASA 81mg daily    (J85.2) Abscess of middle lobe of right lung without pneumonia (H)  Monitor    (I10) Hypertension, unspecified type  Not on medications, -120s, HR <80s    Renal fx  Last Cr 0.82 with GFR >90, recheck BMP    Anemia " surveillance  Last Hgb 13.9, recheck CBC    (K59.01) Slow transit constipation  Continue miralax daily and senna S 1 tab BID    (R62.7) Failure to thrive in adult  (F10.20) Alcohol dependence, uncomplicated (H)  Continue remeron 45mg at HS, wt stable    Orders:  BMP/CBC/TSH/A1c    Electronically signed by: Mario Young NP         Sincerely,        Mario Young NP

## 2024-01-02 NOTE — PROGRESS NOTES
"[facility] GERIATRICS    Chief Complaint   Patient presents with    RECHECK     HPI:  Matheus Nieves is a 68 year old  (1955), who is being seen today for an episodic care visit at: [facility] () [[id number]].     This is a 67 yo male with PMHx for CVA, lung abscess, constipation who has no issues today. Of note has been in LTC since 3/2023.    Allergies, and PMH/PSH reviewed in EPIC today.  REVIEW OF SYSTEMS:  4 point ROS including Respiratory, CV, GI and , other than that noted in the HPI,  is negative    Objective:   /85   Pulse 61   Temp 98  F (36.7  C)   Resp 16   Ht 1.778 m (5' 10\")   Wt 64 kg (141 lb)   SpO2 96%   BMI 20.23 kg/m    GENERAL APPEARANCE:  Alert, in no distress, cooperative, denies pain  RESP:  lungs clear to auscultation , no respiratory distress  CV:  regular rate and rhythm, no murmur, rub, or gallop, no edema  PSYCH:  oriented to 2    Most Recent 3 CBC's:  Recent Labs   Lab Test 04/11/23  1115 03/27/23  0746 03/26/23  0825   WBC 4.3 9.2 9.0   HGB 13.9 12.3* 12.9*   MCV 93 92 94    228 221     Most Recent 3 BMP's:  Recent Labs   Lab Test 04/11/23  1115 03/27/23  0746 03/26/23  0825    139 141   POTASSIUM 4.5 4.1 4.4   CHLORIDE 106 108* 108*   CO2 28 26 26   BUN 18 13 16   CR 0.82 0.80 0.84   ANIONGAP 7 5 7   BERNARDINO 8.9 8.9 8.7   GLC 98 90 71     Most Recent TSH and T4:  Recent Labs   Lab Test 03/20/23  1739   TSH 1.59     Most Recent Hemoglobin A1c:No lab results found.    Assessment/Plan:    (G81.91) Right hemiparesis (H)  (primary encounter diagnosis)  (R47.1) Dysarthria and anarthria  Continue statin and ASA 81mg daily    (J85.2) Abscess of middle lobe of right lung without pneumonia (H)  Monitor    (I10) Hypertension, unspecified type  Not on medications, -120s, HR <80s    Renal fx  Last Cr 0.82 with GFR >90, recheck BMP    Anemia surveillance  Last Hgb 13.9, recheck CBC    (K59.01) Slow transit constipation  Continue miralax " daily and senna S 1 tab BID    (R62.7) Failure to thrive in adult  (F10.20) Alcohol dependence, uncomplicated (H)  Continue remeron 45mg at HS, wt stable    Orders:  BMP/CBC/TSH/A1c    Electronically signed by: Mario Young NP

## 2024-01-04 ENCOUNTER — LAB REQUISITION (OUTPATIENT)
Dept: LAB | Facility: CLINIC | Age: 69
End: 2024-01-04
Payer: MEDICARE

## 2024-01-04 DIAGNOSIS — I10 ESSENTIAL (PRIMARY) HYPERTENSION: ICD-10-CM

## 2024-01-05 LAB
ANION GAP SERPL CALCULATED.3IONS-SCNC: 12 MMOL/L (ref 7–15)
BUN SERPL-MCNC: 23.6 MG/DL (ref 8–23)
CALCIUM SERPL-MCNC: 9.1 MG/DL (ref 8.8–10.2)
CHLORIDE SERPL-SCNC: 108 MMOL/L (ref 98–107)
CREAT SERPL-MCNC: 0.86 MG/DL (ref 0.67–1.17)
DEPRECATED HCO3 PLAS-SCNC: 22 MMOL/L (ref 22–29)
EGFRCR SERPLBLD CKD-EPI 2021: >90 ML/MIN/1.73M2
ERYTHROCYTE [DISTWIDTH] IN BLOOD BY AUTOMATED COUNT: 13.4 % (ref 10–15)
GLUCOSE SERPL-MCNC: 80 MG/DL (ref 70–99)
HBA1C MFR BLD: 5.5 %
HCT VFR BLD AUTO: 45.4 % (ref 40–53)
HGB BLD-MCNC: 14.8 G/DL (ref 13.3–17.7)
MCH RBC QN AUTO: 30.3 PG (ref 26.5–33)
MCHC RBC AUTO-ENTMCNC: 32.6 G/DL (ref 31.5–36.5)
MCV RBC AUTO: 93 FL (ref 78–100)
PLATELET # BLD AUTO: 171 10E3/UL (ref 150–450)
POTASSIUM SERPL-SCNC: 4.3 MMOL/L (ref 3.4–5.3)
RBC # BLD AUTO: 4.88 10E6/UL (ref 4.4–5.9)
SODIUM SERPL-SCNC: 142 MMOL/L (ref 135–145)
TSH SERPL DL<=0.005 MIU/L-ACNC: 2.08 UIU/ML (ref 0.3–4.2)
WBC # BLD AUTO: 6.9 10E3/UL (ref 4–11)

## 2024-01-05 PROCEDURE — 36415 COLL VENOUS BLD VENIPUNCTURE: CPT | Mod: ORL | Performed by: NURSE PRACTITIONER

## 2024-01-05 PROCEDURE — P9604 ONE-WAY ALLOW PRORATED TRIP: HCPCS | Mod: ORL | Performed by: NURSE PRACTITIONER

## 2024-01-05 PROCEDURE — 83036 HEMOGLOBIN GLYCOSYLATED A1C: CPT | Mod: ORL | Performed by: NURSE PRACTITIONER

## 2024-01-05 PROCEDURE — 80048 BASIC METABOLIC PNL TOTAL CA: CPT | Mod: ORL | Performed by: NURSE PRACTITIONER

## 2024-01-05 PROCEDURE — 84443 ASSAY THYROID STIM HORMONE: CPT | Mod: ORL | Performed by: NURSE PRACTITIONER

## 2024-01-05 PROCEDURE — 85027 COMPLETE CBC AUTOMATED: CPT | Mod: ORL | Performed by: NURSE PRACTITIONER

## 2024-01-14 NOTE — PROGRESS NOTES
[facility] GERIATRICS  [place] Medical Record Number:  [id number]  Place of Service where encounter took place: [facility] () [[id number]]  CODE STATUS:   DNR / DNI    Chief Complaint/Reason for Visit:  Chief Complaint   Patient presents with    Establish Care     MD note, new to LT.        HPI:    Matheus Nieves is a 68 year old male seen to establish care in LTC at [facility]. He was previously at another [facility] facility to which he was admitted following hospitalization in 2023 for a right lung abscess, concern for malignancy, aspiration. He has hx of prior stroke. There were vulnerable adult concerns and he needed placement. His chart was reviewed, meds, nurses notes.     He is on aspirin, Lipitor, melatonin, mirtazapine and Senna S. Hx of HTN per chart review though no BP meds.     He is laying in bed, not very interactive. BIMS is 11/15 indicating cognitive impairment. Diet is altered textures but ok thin liquids per speech therapy assessment. Hx of dysphagia. He has not been ill recently. No concerns acutely noted per nursing. He denies pain.       REVIEW OF SYSTEMS:  All others negative other than those noted in HPI.  Limited by impaired cognition.       PAST MEDICAL HISTORY:  Past Medical History:   Diagnosis Date    Cerebral infarction (H)     Hypertension        PAST SURGICAL HISTORY:  No past surgical history on file.  Unable to obtain due to impaired cognition.       FAMILY HISTORY:  Family History   Problem Relation Age of Onset    Hypertension Mother        SOCIAL HISTORY:  Social History     Socioeconomic History    Marital status: Legally      Spouse name: Not on file    Number of children: Not on file    Years of education: Not on file    Highest education level: Not on file   Occupational History    Not on file   Tobacco Use    Smoking status: Former     Types: Cigarettes     Quit date: 2014     Years since quittin.4    Smokeless  tobacco: Never   Substance and Sexual Activity    Alcohol use: Yes     Alcohol/week: 0.0 standard drinks of alcohol     Comment: Alcoholic Drinks/day: voldka pint 3 times a week    Drug use: Yes     Frequency: 1.0 times per week     Types: Marijuana    Sexual activity: Not on file   Other Topics Concern    Not on file   Social History Narrative    Not on file     Social Determinants of Health     Financial Resource Strain: Not on file   Food Insecurity: Not on file   Transportation Needs: Not on file   Physical Activity: Not on file   Stress: Not on file   Social Connections: Not on file   Interpersonal Safety: Not on file   Housing Stability: Not on file       MEDICATIONS:  Current Outpatient Medications   Medication Sig Dispense Refill    melatonin 1 MG TABS tablet Take 1 mg by mouth at bedtime      aspirin 81 MG EC tablet Take 81 mg by mouth daily      atorvastatin (LIPITOR) 10 MG tablet Take 10 mg by mouth daily      mirtazapine (REMERON) 45 MG tablet Take 45 mg by mouth at bedtime      polyethylene glycol (MIRALAX) 17 g packet Take 1 packet by mouth daily      senna-docusate (SENOKOT-S/PERICOLACE) 8.6-50 MG tablet Take 1 tablet by mouth daily         ALLERGIES:   No Known Allergies      PHYSICAL EXAM:  General: Patient is alert male, no distress.   Vitals: /85, Temp 97.2, Pulse 74, RR 18, O2 sat 96% RA.   HEENT: Head is NCAT. Eyes show no injection or icterus. Nares negative. Oropharynx well hydrated.  Neck: Supple. No tenderness or adenopathy. No JVD.  Lungs: Clear bilaterally. No wheezes.  Cardiovascular: Regular rate and rhythm, normal S1, S2.  Back: No tenderness.  Abdomen: Soft, no tenderness on exam. Bowel sounds present. No guarding rebound or rigidity.  : Deferred.  Extremities: No edema is noted.  Musculoskeletal: Weak on right side.   Skin: Warm and dry.   Psych: Difficult to assess. Flat affect.       LABS/DIAGNOSTIC DATA:  Component      Latest Ref Rng 3/27/2023  7:46 AM 4/11/2023  11:15 AM    Sodium      135 - 145 mmol/L 139  141    Anion Gap      7 - 15 mmol/L 5  7    Creatinine      0.67 - 1.17 mg/dL 0.80  0.82    Calcium      8.8 - 10.2 mg/dL 8.9  8.9    GFR Estimate      >60 mL/min/1.73m2 >90  >90    Potassium      3.4 - 5.3 mmol/L     Chloride      98 - 107 mmol/L     Carbon Dioxide (CO2)      22 - 29 mmol/L     Urea Nitrogen      8.0 - 23.0 mg/dL     Glucose      70 - 99 mg/dL       Component      Latest Ref Rng 3/27/2023  7:46 AM 4/11/2023  11:15 AM   WBC      4.0 - 11.0 10e3/uL 9.2  4.3    RBC Count      4.40 - 5.90 10e6/uL 4.05 (L)  4.54    Hemoglobin      13.3 - 17.7 g/dL 12.3 (L)  13.9    Hematocrit      40.0 - 53.0 % 37.4 (L)  42.2    MCV      78 - 100 fL 92  93    MCH      26.5 - 33.0 pg 30.4  30.6    MCHC      31.5 - 36.5 g/dL 32.9  32.9    RDW      10.0 - 15.0 % 13.6  14.2    Platelet Count      150 - 450 10e3/uL 228  194           ASSESSMENT/PLAN:  Hx of stroke. Cerebrovascular disease. He is on aspirin and atorvastatin, continue. Right sided weakness.   Dysphagia. Ok for thin liquids, textures altered. Monitor per protocol.   HTN. Not on BP meds. Monitor Bps per LTC protocol.   Hx lung abscess RML. Initial concern for mass, malignancy, decreased in size from March 2023 to May 2023, unclear if he had follow up with pulmonary.   5.   Hx Hepatitis C. Unclear details if he has had treatment or follow up with hepatology.  6.   Cognitive impairment BIMS 11/15.  7.   Code status is DNR/DNI.           Electronically signed by: Ashley Lorenzo MD

## 2024-02-13 ENCOUNTER — DOCUMENTATION ONLY (OUTPATIENT)
Dept: GERIATRICS | Facility: CLINIC | Age: 69
End: 2024-02-13
Payer: MEDICARE

## 2024-02-29 ENCOUNTER — NURSING HOME VISIT (OUTPATIENT)
Dept: GERIATRICS | Facility: CLINIC | Age: 69
End: 2024-02-29
Payer: MEDICARE

## 2024-02-29 VITALS
BODY MASS INDEX: 19.94 KG/M2 | RESPIRATION RATE: 18 BRPM | OXYGEN SATURATION: 94 % | DIASTOLIC BLOOD PRESSURE: 78 MMHG | HEIGHT: 70 IN | HEART RATE: 63 BPM | TEMPERATURE: 98.2 F | SYSTOLIC BLOOD PRESSURE: 117 MMHG | WEIGHT: 139.3 LBS

## 2024-02-29 DIAGNOSIS — R62.7 ADULT FAILURE TO THRIVE: ICD-10-CM

## 2024-02-29 DIAGNOSIS — I10 HYPERTENSION, UNSPECIFIED TYPE: ICD-10-CM

## 2024-02-29 DIAGNOSIS — F51.01 PRIMARY INSOMNIA: ICD-10-CM

## 2024-02-29 DIAGNOSIS — K59.01 SLOW TRANSIT CONSTIPATION: Primary | ICD-10-CM

## 2024-02-29 PROCEDURE — 99309 SBSQ NF CARE MODERATE MDM 30: CPT | Performed by: NURSE PRACTITIONER

## 2024-02-29 NOTE — LETTER
"    2/29/2024        RE: Matheus Nieves  [address]  [facility] [address]        [facility] GERIATRICS    Chief Complaint   Patient presents with     RECHECK     HPI:  Matheus Nieves is a 69 year old  (1955), who is being seen today for an episodic care visit at: [facility] () [[id number]].     This is a 69 yo male with PMHx for CVA, lung abscess, constipation who has no issues today. Of note has been in LTC since 3/2023.     Today's concern is:   Insomnia, loose stools    Allergies, and PMH/PSH reviewed in EPIC today.  REVIEW OF SYSTEMS:  4 point ROS including Respiratory, CV, GI and , other than that noted in the HPI,  is negative    Objective:   /78   Pulse 63   Temp 98.2  F (36.8  C)   Resp 18   Ht 1.778 m (5' 10\")   Wt 63.2 kg (139 lb 4.8 oz)   SpO2 94%   BMI 19.99 kg/m    GENERAL APPEARANCE:  Alert, in no distress, cooperative, denies pain  RESP:  diminished bilaterally, no respiratory distress, RA  CV:  regular rate and rhythm, no murmur, rub, or gallop, no edema  GI: loose stools  PSYCH:  oriented to 2    Most Recent 3 CBC's:  Recent Labs   Lab Test 01/05/24  0555 04/11/23  1115 03/27/23  0746   WBC 6.9 4.3 9.2   HGB 14.8 13.9 12.3*   MCV 93 93 92    194 228     Most Recent 3 BMP's:  Recent Labs   Lab Test 01/05/24  0555 04/11/23  1115 03/27/23  0746    141 139   POTASSIUM 4.3 4.5 4.1   CHLORIDE 108* 106 108*   CO2 22 28 26   BUN 23.6* 18 13   CR 0.86 0.82 0.80   ANIONGAP 12 7 5   BERNARDINO 9.1 8.9 8.9   GLC 80 98 90     Most Recent TSH and T4:  Recent Labs   Lab Test 01/05/24  0555   TSH 2.08     Most Recent Hemoglobin A1c:  Recent Labs   Lab Test 01/05/24  0555   A1C 5.5       Assessment/Plan:    (G81.91) Right hemiparesis (H)   (R47.1) Dysarthria and anarthria  Continue statin and ASA 81mg daily     (J85.2) Abscess of middle lobe of right lung without pneumonia (H)  Monitor, declined speech consult     (I10) Hypertension, unspecified type  Not on " medications, -120s, HR <80s. Stable     Renal fx  Last Cr 0.86 with GFR >90 on 1/5     Anemia surveillance  Last Hgb 14.8 on 1/5     (K59.01) Slow transit constipation  Continue miralax daily and today decrease senna S 1 tab daily     (R62.7) Failure to thrive in adult  (F10.20) Alcohol dependence, uncomplicated (H)  Continue remeron 45mg at HS, wt stable. No change    Insomnia  Today increase melatonin to 6mg at HS    Orders:  Melatonin, senna S    Electronically signed by: Mario Young NP         Sincerely,        Mario Young NP

## 2024-02-29 NOTE — PROGRESS NOTES
"[facility] GERIATRICS    Chief Complaint   Patient presents with    RECHECK     HPI:  Matheus Nieves is a 69 year old  (1955), who is being seen today for an episodic care visit at: [facility] () [[id number]].     This is a 69 yo male with PMHx for CVA, lung abscess, constipation who has no issues today. Of note has been in LTC since 3/2023.     Today's concern is:   Insomnia, loose stools    Allergies, and PMH/PSH reviewed in [facility] today.  REVIEW OF SYSTEMS:  4 point ROS including Respiratory, CV, GI and , other than that noted in the HPI,  is negative    Objective:   /78   Pulse 63   Temp 98.2  F (36.8  C)   Resp 18   Ht 1.778 m (5' 10\")   Wt 63.2 kg (139 lb 4.8 oz)   SpO2 94%   BMI 19.99 kg/m    GENERAL APPEARANCE:  Alert, in no distress, cooperative, denies pain  RESP:  diminished bilaterally, no respiratory distress, RA  CV:  regular rate and rhythm, no murmur, rub, or gallop, no edema  GI: loose stools  PSYCH:  oriented to 2    Most Recent 3 CBC's:  Recent Labs   Lab Test 01/05/24  0555 04/11/23  1115 03/27/23  0746   WBC 6.9 4.3 9.2   HGB 14.8 13.9 12.3*   MCV 93 93 92    194 228     Most Recent 3 BMP's:  Recent Labs   Lab Test 01/05/24  0555 04/11/23  1115 03/27/23  0746    141 139   POTASSIUM 4.3 4.5 4.1   CHLORIDE 108* 106 108*   CO2 22 28 26   BUN 23.6* 18 13   CR 0.86 0.82 0.80   ANIONGAP 12 7 5   BERNARDINO 9.1 8.9 8.9   GLC 80 98 90     Most Recent TSH and T4:  Recent Labs   Lab Test 01/05/24  0555   TSH 2.08     Most Recent Hemoglobin A1c:  Recent Labs   Lab Test 01/05/24  0555   A1C 5.5       Assessment/Plan:    (G81.91) Right hemiparesis (H)   (R47.1) Dysarthria and anarthria  Continue statin and ASA 81mg daily     (J85.2) Abscess of middle lobe of right lung without pneumonia (H)  Monitor, declined speech consult     (I10) Hypertension, unspecified type  Not on medications, -120s, HR <80s. Stable     Renal fx  Last Cr 0.86 with GFR >90 on 1/5   "   Anemia surveillance  Last Hgb 14.8 on 1/5     (K59.01) Slow transit constipation  Continue miralax daily and today decrease senna S 1 tab daily     (R62.7) Failure to thrive in adult  (F10.20) Alcohol dependence, uncomplicated (H)  Continue remeron 45mg at HS, wt stable. No change    Insomnia  Today increase melatonin to 6mg at HS    Orders:  Melatonin, senna S    Electronically signed by: Mario Young NP

## 2024-03-01 ENCOUNTER — APPOINTMENT (OUTPATIENT)
Dept: GERIATRICS | Facility: CLINIC | Age: 69
End: 2024-03-01
Payer: MEDICARE

## 2024-04-24 VITALS
TEMPERATURE: 98.2 F | RESPIRATION RATE: 18 BRPM | OXYGEN SATURATION: 94 % | DIASTOLIC BLOOD PRESSURE: 78 MMHG | SYSTOLIC BLOOD PRESSURE: 117 MMHG | HEART RATE: 63 BPM

## 2024-04-25 ENCOUNTER — NURSING HOME VISIT (OUTPATIENT)
Dept: GERIATRICS | Facility: CLINIC | Age: 69
End: 2024-04-25
Payer: MEDICARE

## 2024-04-25 DIAGNOSIS — K59.01 SLOW TRANSIT CONSTIPATION: ICD-10-CM

## 2024-04-25 DIAGNOSIS — R47.1 DYSARTHRIA AND ANARTHRIA: Primary | ICD-10-CM

## 2024-04-25 DIAGNOSIS — I10 HYPERTENSION, UNSPECIFIED TYPE: ICD-10-CM

## 2024-04-25 DIAGNOSIS — F51.01 PRIMARY INSOMNIA: ICD-10-CM

## 2024-04-25 PROCEDURE — 99309 SBSQ NF CARE MODERATE MDM 30: CPT | Performed by: NURSE PRACTITIONER

## 2024-04-25 NOTE — LETTER
4/25/2024        RE: Matheus Nieves  [address]  [facility] [address]        [facility] GERIATRICS    Chief Complaint   Patient presents with     RECHECK     HPI:  Matheus Nieves is a 69 year old  (1955), who is being seen today for an episodic care visit at: [facility] () [[id number]].     This is a 69 yo male with PMHx for CVA, lung abscess, constipation who has no issues today. Of note has been in LTC since 3/2023.    Today's concern is:   insomnia    Allergies, and PMH/PSH reviewed in EPIC today.  REVIEW OF SYSTEMS:  4 point ROS including Respiratory, CV, GI and , other than that noted in the HPI,  is negative    Objective:   /78   Pulse 63   Temp 98.2  F (36.8  C)   Resp 18   SpO2 94%   GENERAL APPEARANCE:  Alert, in no distress, cooperative, denies pain  RESP:  no respiratory distress, RA  CV:  regular rate and rhythm, no murmur, rub, or gallop, no edema  PSYCH:  oriented to 2    Most Recent 3 CBC's:  Recent Labs   Lab Test 01/05/24  0555 04/11/23  1115 03/27/23  0746   WBC 6.9 4.3 9.2   HGB 14.8 13.9 12.3*   MCV 93 93 92    194 228     Most Recent 3 BMP's:  Recent Labs   Lab Test 01/05/24  0555 04/11/23  1115 03/27/23  0746    141 139   POTASSIUM 4.3 4.5 4.1   CHLORIDE 108* 106 108*   CO2 22 28 26   BUN 23.6* 18 13   CR 0.86 0.82 0.80   ANIONGAP 12 7 5   BERNARDINO 9.1 8.9 8.9   GLC 80 98 90     Most Recent TSH and T4:  Recent Labs   Lab Test 01/05/24  0555   TSH 2.08     Most Recent Hemoglobin A1c:  Recent Labs   Lab Test 01/05/24  0555   A1C 5.5       Assessment/Plan:    (G81.91) Right hemiparesis (H)   (R47.1) Dysarthria and anarthria  Continue statin and ASA 81mg daily. No change     (J85.2) Abscess of middle lobe of right lung without pneumonia (H)  Monitor, declined speech consult prior     (I10) Hypertension, unspecified type  Not on medications, -120s, HR <80s. No change     Renal fx  Last Cr 0.86 with GFR >90 on 1/5, recheck BMP on 5/1      Anemia surveillance  Last Hgb 14.8 on 1/5, recheck CBC on 5/1     (K59.01) Slow transit constipation  Continue miralax daily and senna S 1 tab daily, adequate     (R62.7) Failure to thrive in adult  (F10.20) Alcohol dependence, uncomplicated (H)  Continue remeron 45mg at HS, 139lbs (stable)     Insomnia  Continue melatonin 6mg at HS, adequate    Orders:  BMP/CBC    Electronically signed by: Mario Young NP         Sincerely,        Mario Young NP

## 2024-04-25 NOTE — PROGRESS NOTES
[facility] GERIATRICS    Chief Complaint   Patient presents with    RECHECK     HPI:  Matheus Nieves is a 69 year old  (1955), who is being seen today for an episodic care visit at: [facility] () [[id number]].     This is a 69 yo male with PMHx for CVA, lung abscess, constipation who has no issues today. Of note has been in LTC since 3/2023.    Today's concern is:   insomnia    Allergies, and PMH/PSH reviewed in [facility] today.  REVIEW OF SYSTEMS:  4 point ROS including Respiratory, CV, GI and , other than that noted in the HPI,  is negative    Objective:   /78   Pulse 63   Temp 98.2  F (36.8  C)   Resp 18   SpO2 94%   GENERAL APPEARANCE:  Alert, in no distress, cooperative, denies pain  RESP:  no respiratory distress, RA  CV:  regular rate and rhythm, no murmur, rub, or gallop, no edema  PSYCH:  oriented to 2    Most Recent 3 CBC's:  Recent Labs   Lab Test 01/05/24  0555 04/11/23  1115 03/27/23  0746   WBC 6.9 4.3 9.2   HGB 14.8 13.9 12.3*   MCV 93 93 92    194 228     Most Recent 3 BMP's:  Recent Labs   Lab Test 01/05/24  0555 04/11/23  1115 03/27/23  0746    141 139   POTASSIUM 4.3 4.5 4.1   CHLORIDE 108* 106 108*   CO2 22 28 26   BUN 23.6* 18 13   CR 0.86 0.82 0.80   ANIONGAP 12 7 5   BERNARDINO 9.1 8.9 8.9   GLC 80 98 90     Most Recent TSH and T4:  Recent Labs   Lab Test 01/05/24  0555   TSH 2.08     Most Recent Hemoglobin A1c:  Recent Labs   Lab Test 01/05/24  0555   A1C 5.5       Assessment/Plan:    (G81.91) Right hemiparesis (H)   (R47.1) Dysarthria and anarthria  Continue statin and ASA 81mg daily. No change     (J85.2) Abscess of middle lobe of right lung without pneumonia (H)  Monitor, declined speech consult prior     (I10) Hypertension, unspecified type  Not on medications, -120s, HR <80s. No change     Renal fx  Last Cr 0.86 with GFR >90 on 1/5, recheck BMP on 5/1     Anemia surveillance  Last Hgb 14.8 on 1/5, recheck CBC on 5/1     (K59.01) Slow  transit constipation  Continue miralax daily and senna S 1 tab daily, adequate     (R62.7) Failure to thrive in adult  (F10.20) Alcohol dependence, uncomplicated (H)  Continue remeron 45mg at HS, 139lbs (stable)     Insomnia  Continue melatonin 6mg at HS, adequate    Orders:  BMP/CBC    Electronically signed by: Mario Young NP

## 2024-04-30 ENCOUNTER — LAB REQUISITION (OUTPATIENT)
Dept: LAB | Facility: CLINIC | Age: 69
End: 2024-04-30
Payer: MEDICARE

## 2024-04-30 DIAGNOSIS — I10 ESSENTIAL (PRIMARY) HYPERTENSION: ICD-10-CM

## 2024-05-01 LAB
ANION GAP SERPL CALCULATED.3IONS-SCNC: 11 MMOL/L (ref 7–15)
BUN SERPL-MCNC: 29 MG/DL (ref 8–23)
CALCIUM SERPL-MCNC: 9.2 MG/DL (ref 8.8–10.2)
CHLORIDE SERPL-SCNC: 106 MMOL/L (ref 98–107)
CREAT SERPL-MCNC: 0.89 MG/DL (ref 0.67–1.17)
DEPRECATED HCO3 PLAS-SCNC: 24 MMOL/L (ref 22–29)
EGFRCR SERPLBLD CKD-EPI 2021: >90 ML/MIN/1.73M2
ERYTHROCYTE [DISTWIDTH] IN BLOOD BY AUTOMATED COUNT: 14.3 % (ref 10–15)
GLUCOSE SERPL-MCNC: 69 MG/DL (ref 70–99)
HCT VFR BLD AUTO: 42.3 % (ref 40–53)
HGB BLD-MCNC: 14.3 G/DL (ref 13.3–17.7)
MCH RBC QN AUTO: 31.4 PG (ref 26.5–33)
MCHC RBC AUTO-ENTMCNC: 33.8 G/DL (ref 31.5–36.5)
MCV RBC AUTO: 93 FL (ref 78–100)
PLATELET # BLD AUTO: 174 10E3/UL (ref 150–450)
POTASSIUM SERPL-SCNC: 4 MMOL/L (ref 3.4–5.3)
RBC # BLD AUTO: 4.55 10E6/UL (ref 4.4–5.9)
SODIUM SERPL-SCNC: 141 MMOL/L (ref 135–145)
WBC # BLD AUTO: 7.9 10E3/UL (ref 4–11)

## 2024-05-01 PROCEDURE — 36415 COLL VENOUS BLD VENIPUNCTURE: CPT | Mod: ORL | Performed by: NURSE PRACTITIONER

## 2024-05-01 PROCEDURE — P9604 ONE-WAY ALLOW PRORATED TRIP: HCPCS | Mod: ORL | Performed by: NURSE PRACTITIONER

## 2024-05-01 PROCEDURE — 80048 BASIC METABOLIC PNL TOTAL CA: CPT | Mod: ORL | Performed by: NURSE PRACTITIONER

## 2024-05-01 PROCEDURE — 85027 COMPLETE CBC AUTOMATED: CPT | Mod: ORL | Performed by: NURSE PRACTITIONER

## 2024-05-22 VITALS
HEART RATE: 66 BPM | OXYGEN SATURATION: 93 % | WEIGHT: 138.1 LBS | SYSTOLIC BLOOD PRESSURE: 111 MMHG | BODY MASS INDEX: 19.77 KG/M2 | TEMPERATURE: 98.6 F | RESPIRATION RATE: 17 BRPM | HEIGHT: 70 IN | DIASTOLIC BLOOD PRESSURE: 75 MMHG

## 2024-05-23 ENCOUNTER — NURSING HOME VISIT (OUTPATIENT)
Dept: GERIATRICS | Facility: CLINIC | Age: 69
End: 2024-05-23
Payer: MEDICARE

## 2024-05-23 DIAGNOSIS — R47.1 DYSARTHRIA AND ANARTHRIA: Primary | ICD-10-CM

## 2024-05-23 DIAGNOSIS — K59.01 SLOW TRANSIT CONSTIPATION: ICD-10-CM

## 2024-05-23 DIAGNOSIS — R62.7 ADULT FAILURE TO THRIVE: ICD-10-CM

## 2024-05-23 DIAGNOSIS — F51.01 PRIMARY INSOMNIA: ICD-10-CM

## 2024-05-23 PROCEDURE — 99309 SBSQ NF CARE MODERATE MDM 30: CPT | Performed by: NURSE PRACTITIONER

## 2024-05-23 NOTE — PROGRESS NOTES
"[facility] GERIATRICS    Chief Complaint   Patient presents with    RECHECK     HPI:  Matheus Nieves is a 69 year old  (1955), who is being seen today for an episodic care visit at: [facility] () [[id number]].     This is a 69 yo male with PMHx for CVA, lung abscess, constipation who has no issues today. Of note has been in LTC since 3/2023.     Allergies, and PMH/PSH reviewed in EPIC today.  REVIEW OF SYSTEMS:  4 point ROS including Respiratory, CV, GI and , other than that noted in the HPI,  is negative    Objective:   /75   Pulse 66   Temp 98.6  F (37  C)   Resp 17   Ht 1.778 m (5' 10\")   Wt 62.6 kg (138 lb 1.6 oz)   SpO2 93%   BMI 19.82 kg/m    GENERAL APPEARANCE:  Alert, in no distress, cooperative, denies pain  RESP:  no respiratory distress, RA  CV:  regular rate and rhythm, no murmur, rub, or gallop, no edema  PSYCH:  oriented to 2    Most Recent 3 CBC's:  Recent Labs   Lab Test 05/01/24  0549 01/05/24  0555 04/11/23  1115   WBC 7.9 6.9 4.3   HGB 14.3 14.8 13.9   MCV 93 93 93    171 194     Most Recent 3 BMP's:  Recent Labs   Lab Test 05/01/24  0549 01/05/24  0555 04/11/23  1115    142 141   POTASSIUM 4.0 4.3 4.5   CHLORIDE 106 108* 106   CO2 24 22 28   BUN 29.0* 23.6* 18   CR 0.89 0.86 0.82   ANIONGAP 11 12 7   BERNARDINO 9.2 9.1 8.9   GLC 69* 80 98     Most Recent TSH and T4:  Recent Labs   Lab Test 01/05/24  0555   TSH 2.08     Most Recent Hemoglobin A1c:  Recent Labs   Lab Test 01/05/24  0555   A1C 5.5       Assessment/Plan:    (G81.91) Right hemiparesis (H)   (R47.1) Dysarthria and anarthria  Continue statin and ASA 81mg daily. No change     (J85.2) Abscess of middle lobe of right lung without pneumonia (H)  Monitor, declined speech consult prior     (I10) Hypertension, unspecified type  Not on medications, -120s, HR <80s. Today no change     Renal fx  Last Cr 0.89 with GFR >90 on 5/1     Anemia surveillance  Last Hgb 14.3 on 5/1     (K59.01) Slow " transit constipation  Continue miralax daily and senna S 1 tab daily, adequate     (R62.7) Failure to thrive in adult  (F10.20) Alcohol dependence, uncomplicated (H)  Continue remeron 45mg at HS, 138lbs (seems stable)     Insomnia  Continue melatonin 6mg at HS, today no change    Orders:  NA    Electronically signed by: Mario Young NP

## 2024-06-27 ENCOUNTER — NURSING HOME VISIT (OUTPATIENT)
Dept: GERIATRICS | Facility: CLINIC | Age: 69
End: 2024-06-27
Payer: MEDICARE

## 2024-06-27 DIAGNOSIS — I10 ESSENTIAL HYPERTENSION: ICD-10-CM

## 2024-06-27 DIAGNOSIS — R13.10 DYSPHAGIA, UNSPECIFIED TYPE: ICD-10-CM

## 2024-06-27 DIAGNOSIS — G81.91 RIGHT HEMIPARESIS (H): ICD-10-CM

## 2024-06-27 DIAGNOSIS — R53.81 DEBILITY: Primary | ICD-10-CM

## 2024-06-27 PROCEDURE — 99309 SBSQ NF CARE MODERATE MDM 30: CPT | Performed by: FAMILY MEDICINE

## 2024-06-27 NOTE — LETTER
6/27/2024      Matheus Nieves  [address]  [place] [address]        No notes on file      Sincerely,        Ashley Lorenzo MD

## 2024-07-22 NOTE — PROGRESS NOTES
[facility] GERIATRICS  [place] Medical Record Number:  [id number]  Place of Service where encounter took place: [facility] () [[id number]]  CODE STATUS:   DNR / DNI    Chief Complaint/Reason for Visit:  Chief Complaint   Patient presents with    [facility] Regulatory     LTC 6/27/2024.        HPI:    Mathesu Nieves is a 69 year old male who moved to [facility] from a different [facility] facility that he was admitted to after March 2023 hospitalization for lung abscess, concern for malignancy, aspiration, hx of prior stroke, needed placement, concern for vulnerable adult.       Today:  He is seen today for regulatory visit in LT. Not very interactive but shakes or nods responses. Denies pain. Has no difficulty breathing. No concerns documented per nursing. There has been a COVID outbreak in the facility, he tested neg. No recent medication changes. He has not been ill.       PAST MEDICAL HISTORY:  Past Medical History:   Diagnosis Date    Cerebral infarction (H)     Hypertension        MEDICATIONS:  Current Outpatient Medications   Medication Sig Dispense Refill    aspirin 81 MG EC tablet Take 81 mg by mouth daily      atorvastatin (LIPITOR) 10 MG tablet Take 10 mg by mouth daily      melatonin 1 MG TABS tablet Take 6 mg by mouth at bedtime      mirtazapine (REMERON) 45 MG tablet Take 45 mg by mouth at bedtime      polyethylene glycol (MIRALAX) 17 g packet Take 1 packet by mouth daily      senna-docusate (SENOKOT-S/PERICOLACE) 8.6-50 MG tablet Take 1 tablet by mouth daily           PHYSICAL EXAM:  General: Patient is alert male, no distress.   HEENT: Head is NCAT. Eyes show no injection or icterus. Nares negative. Oropharynx well hydrated.  Neck: No JVD.  Lungs: Non labored respirations.   : Deferred.  Extremities: No edema.  Musculoskeletal: Right sided weakness.   Skin: No rashes noted.   Psych: Difficult to assess.       LABS/DIAGNOSTIC DATA:  Component      Latest Ref Rng  4/11/2023  11:15 AM   Sodium      136 - 145 mmol/L 141    Potassium      3.5 - 5.0 mmol/L 4.5    Chloride      98 - 107 mmol/L 106    Carbon Dioxide      22 - 31 mmol/L 28    Anion Gap      5 - 18 mmol/L 7    Urea Nitrogen      8 - 22 mg/dL 18    Creatinine      0.70 - 1.30 mg/dL 0.82    Calcium      8.5 - 10.5 mg/dL 8.9    Glucose      70 - 125 mg/dL 98    GFR Estimate      >60 mL/min/1.73m2 >90      Component      Latest Ref Rng 4/11/2023  11:15 AM   WBC      4.0 - 11.0 10e3/uL 4.3    RBC Count      4.40 - 5.90 10e6/uL 4.54    Hemoglobin      13.3 - 17.7 g/dL 13.9    Hematocrit      40.0 - 53.0 % 42.2    MCV      78 - 100 fL 93    MCH      26.5 - 33.0 pg 30.6    MCHC      31.5 - 36.5 g/dL 32.9    RDW      10.0 - 15.0 % 14.2    Platelet Count      150 - 450 10e3/uL 194        ASSESSMENT/PLAN:  1. General debility. Resides in LTC, needs much assist. Overall stable.  2. Hx of stroke. Cerebrovascular disease. On aspirin, statin. Right sided weakness.   3. Dysphagia. Altered textures though ok with thin liquids.   4. HTN. Not on BP meds, continue to monitor per routine.  5. Hx ETOH abuse.       Electronically signed by: Ashley Lorenzo MD

## 2024-08-01 ENCOUNTER — NURSING HOME VISIT (OUTPATIENT)
Dept: GERIATRICS | Facility: CLINIC | Age: 69
End: 2024-08-01
Payer: MEDICARE

## 2024-08-01 VITALS
DIASTOLIC BLOOD PRESSURE: 76 MMHG | BODY MASS INDEX: 20.07 KG/M2 | HEIGHT: 70 IN | SYSTOLIC BLOOD PRESSURE: 115 MMHG | TEMPERATURE: 98.2 F | HEART RATE: 103 BPM | RESPIRATION RATE: 16 BRPM | WEIGHT: 140.2 LBS | OXYGEN SATURATION: 94 %

## 2024-08-01 DIAGNOSIS — R62.7 ADULT FAILURE TO THRIVE: ICD-10-CM

## 2024-08-01 DIAGNOSIS — R47.1 DYSARTHRIA AND ANARTHRIA: Primary | ICD-10-CM

## 2024-08-01 DIAGNOSIS — F51.01 PRIMARY INSOMNIA: ICD-10-CM

## 2024-08-01 DIAGNOSIS — K59.01 SLOW TRANSIT CONSTIPATION: ICD-10-CM

## 2024-08-01 PROCEDURE — 99309 SBSQ NF CARE MODERATE MDM 30: CPT | Performed by: NURSE PRACTITIONER

## 2024-08-01 NOTE — LETTER
" 8/1/2024      Matheus Nieves  [address]  [facility] [address]        [facility] GERIATRICS    Chief Complaint   Patient presents with     RECHECK     HPI:  Matheus Nieves is a 69 year old  (1955), who is being seen today for an episodic care visit at: [facility] () [[id number]].     This is a 69 yo male with PMHx for CVA, lung abscess, constipation who has no issues today. Of note has been in LTC since 3/2023.     Today's concern is:   No issues today    Allergies, and PMH/PSH reviewed in EPIC today.  REVIEW OF SYSTEMS:  4 point ROS including Respiratory, CV, GI and , other than that noted in the HPI,  is negative    Objective:   /76   Pulse 103   Temp 98.2  F (36.8  C)   Resp 16   Ht 1.778 m (5' 10\")   Wt 63.6 kg (140 lb 3.2 oz)   SpO2 94%   BMI 20.12 kg/m    GENERAL APPEARANCE:  Alert, in no distress, cooperative, denies pain  RESP:  no respiratory distress, RA  CV:  regular rate and rhythm, no murmur, rub, or gallop, no edema  PSYCH:  oriented to 2    Most Recent 3 CBC's:  Recent Labs   Lab Test 05/01/24  0549 01/05/24  0555 04/11/23  1115   WBC 7.9 6.9 4.3   HGB 14.3 14.8 13.9   MCV 93 93 93    171 194     Most Recent 3 BMP's:  Recent Labs   Lab Test 05/01/24  0549 01/05/24  0555 04/11/23  1115    142 141   POTASSIUM 4.0 4.3 4.5   CHLORIDE 106 108* 106   CO2 24 22 28   BUN 29.0* 23.6* 18   CR 0.89 0.86 0.82   ANIONGAP 11 12 7   BERNARDINO 9.2 9.1 8.9   GLC 69* 80 98     Most Recent TSH and T4:  Recent Labs   Lab Test 01/05/24  0555   TSH 2.08     Most Recent Hemoglobin A1c:  Recent Labs   Lab Test 01/05/24  0555   A1C 5.5       Assessment/Plan:    (G81.91) Right hemiparesis (H)   (R47.1) Dysarthria and anarthria  Continue statin and ASA 81mg daily. Stable today     (J85.2) Abscess of middle lobe of right lung without pneumonia (H)  Monitor, declined speech consult prior     (I10) Hypertension, unspecified type  Not on medications, -120s, HR <80s. Today " no change     Renal fx  Last Cr 0.89 with GFR >90 on 5/1     Anemia surveillance  Last Hgb 14.3 on 5/1     (K59.01) Slow transit constipation  Continue miralax daily and senna S 1 tab daily, today no change     (R62.7) Failure to thrive in adult  (F10.20) Alcohol dependence, uncomplicated (H)  Continue remeron 45mg at HS, 140lbs (+2lbs)     Insomnia  Continue melatonin 6mg at HS. Today stable    Orders:  NA    Electronically signed by: Mario Young NP         Sincerely,        Mario Young NP

## 2024-08-01 NOTE — PROGRESS NOTES
"[facility] GERIATRICS    Chief Complaint   Patient presents with    RECHECK     HPI:  Matheus Nieves is a 69 year old  (1955), who is being seen today for an episodic care visit at: [facility] () [[id number]].     This is a 69 yo male with PMHx for CVA, lung abscess, constipation who has no issues today. Of note has been in LTC since 3/2023.     Today's concern is:   No issues today    Allergies, and PMH/PSH reviewed in [facility] today.  REVIEW OF SYSTEMS:  4 point ROS including Respiratory, CV, GI and , other than that noted in the HPI,  is negative    Objective:   /76   Pulse 103   Temp 98.2  F (36.8  C)   Resp 16   Ht 1.778 m (5' 10\")   Wt 63.6 kg (140 lb 3.2 oz)   SpO2 94%   BMI 20.12 kg/m    GENERAL APPEARANCE:  Alert, in no distress, cooperative, denies pain  RESP:  no respiratory distress, RA  CV:  regular rate and rhythm, no murmur, rub, or gallop, no edema  PSYCH:  oriented to 2    Most Recent 3 CBC's:  Recent Labs   Lab Test 05/01/24  0549 01/05/24  0555 04/11/23  1115   WBC 7.9 6.9 4.3   HGB 14.3 14.8 13.9   MCV 93 93 93    171 194     Most Recent 3 BMP's:  Recent Labs   Lab Test 05/01/24  0549 01/05/24  0555 04/11/23  1115    142 141   POTASSIUM 4.0 4.3 4.5   CHLORIDE 106 108* 106   CO2 24 22 28   BUN 29.0* 23.6* 18   CR 0.89 0.86 0.82   ANIONGAP 11 12 7   BERNARDINO 9.2 9.1 8.9   GLC 69* 80 98     Most Recent TSH and T4:  Recent Labs   Lab Test 01/05/24  0555   TSH 2.08     Most Recent Hemoglobin A1c:  Recent Labs   Lab Test 01/05/24  0555   A1C 5.5       Assessment/Plan:    (G81.91) Right hemiparesis (H)   (R47.1) Dysarthria and anarthria  Continue statin and ASA 81mg daily. Stable today     (J85.2) Abscess of middle lobe of right lung without pneumonia (H)  Monitor, declined speech consult prior     (I10) Hypertension, unspecified type  Not on medications, -120s, HR <80s. Today no change     Renal fx  Last Cr 0.89 with GFR >90 on 5/1     Anemia " surveillance  Last Hgb 14.3 on 5/1     (K59.01) Slow transit constipation  Continue miralax daily and senna S 1 tab daily, today no change     (R62.7) Failure to thrive in adult  (F10.20) Alcohol dependence, uncomplicated (H)  Continue remeron 45mg at HS, 140lbs (+2lbs)     Insomnia  Continue melatonin 6mg at HS. Today stable    Orders:  NA    Electronically signed by: Mario Young NP

## 2024-10-10 ENCOUNTER — NURSING HOME VISIT (OUTPATIENT)
Dept: GERIATRICS | Facility: CLINIC | Age: 69
End: 2024-10-10
Payer: MEDICARE

## 2024-10-10 VITALS
WEIGHT: 141.3 LBS | TEMPERATURE: 97.1 F | HEART RATE: 70 BPM | BODY MASS INDEX: 20.23 KG/M2 | RESPIRATION RATE: 18 BRPM | DIASTOLIC BLOOD PRESSURE: 70 MMHG | HEIGHT: 70 IN | OXYGEN SATURATION: 95 % | SYSTOLIC BLOOD PRESSURE: 115 MMHG

## 2024-10-10 DIAGNOSIS — I10 ESSENTIAL HYPERTENSION: ICD-10-CM

## 2024-10-10 DIAGNOSIS — R53.81 DEBILITY: Primary | ICD-10-CM

## 2024-10-10 DIAGNOSIS — R13.10 DYSPHAGIA, UNSPECIFIED TYPE: ICD-10-CM

## 2024-10-10 DIAGNOSIS — G81.91 RIGHT HEMIPARESIS (H): ICD-10-CM

## 2024-10-10 PROCEDURE — 99309 SBSQ NF CARE MODERATE MDM 30: CPT | Performed by: FAMILY MEDICINE

## 2024-10-10 NOTE — LETTER
" 10/10/2024      Matheus Nieves  [address]  [facility] [address]          [facility] GERIATRICS  [place] Medical Record Number:  [id number]  [id number][facility]  CODE STATUS:   DNR / DNI    Chief Complaint/Reason for Visit:  Chief Complaint   Patient presents with     [facility] Regulatory     LT 10/10/2024.        HPI:    Matheus Nieves is a 69 year old male who moved to [facility] from a different [facility] facility that he was admitted to after March 2023 hospitalization for lung abscess, concern for malignancy, aspiration, hx of prior stroke, needed placement, concern for vulnerable adult.       Today:  Seen today for routine regulatory visit. Chart reviewed. No recent medication changes. He has no complaints. Essentially bed bound. Nods or shakes most responses. No new concerns per nursing. Denies pain. Has no difficulty breathing.       PAST MEDICAL HISTORY:  Past Medical History:   Diagnosis Date     Cerebral infarction (H)      Hypertension        MEDICATIONS:  Most accurate list exists at facility.   Current Outpatient Medications   Medication Sig Dispense Refill     aspirin 81 MG EC tablet Take 81 mg by mouth daily       atorvastatin (LIPITOR) 10 MG tablet Take 10 mg by mouth daily       melatonin 1 MG TABS tablet Take 6 mg by mouth at bedtime       mirtazapine (REMERON) 45 MG tablet Take 45 mg by mouth at bedtime       polyethylene glycol (MIRALAX) 17 g packet Take 1 packet by mouth daily       senna-docusate (SENOKOT-S/PERICOLACE) 8.6-50 MG tablet Take 1 tablet by mouth daily           PHYSICAL EXAM:  General: Patient is alert male, no distress.   Vitals: /70   Pulse 70   Temp 97.1  F (36.2  C)   Resp 18   Ht 1.778 m (5' 10\")   Wt 64.1 kg (141 lb 4.8 oz)   SpO2 95%   BMI 20.27 kg/m    HEENT: Head is NCAT. Eyes show no injection or icterus. Nares negative. Oropharynx well hydrated.  Neck: No JVD.  Lungs: Non " labored respirations.   : Deferred.  Extremities: No edema.  Musculoskeletal: Right sided weakness.   Skin: Seborrhea on face.  Psych: Difficult to assess.       LABS/DIAGNOSTIC DATA:  Component      Latest Ref Rn 4/11/2023  11:15 AM 1/5/2024  5:55 AM 5/1/2024  5:49 AM   WBC      4.0 - 11.0 10e3/uL 4.3  6.9  7.9    RBC Count      4.40 - 5.90 10e6/uL 4.54  4.88  4.55    Hemoglobin      13.3 - 17.7 g/dL 13.9  14.8  14.3    Hematocrit      40.0 - 53.0 % 42.2  45.4  42.3    MCV      78 - 100 fL 93  93  93    MCH      26.5 - 33.0 pg 30.6  30.3  31.4    MCHC      31.5 - 36.5 g/dL 32.9  32.6  33.8    RDW      10.0 - 15.0 % 14.2  13.4  14.3    Platelet Count      150 - 450 10e3/uL 194  171  174      Component      Latest Ref Rn 4/11/2023  11:15 AM 1/5/2024  5:55 AM 5/1/2024  5:49 AM   Sodium      135 - 145 mmol/L 141  142  141    Anion Gap      7 - 15 mmol/L 7  12  11    Creatinine      0.67 - 1.17 mg/dL 0.82  0.86  0.89    Calcium      8.8 - 10.2 mg/dL 8.9  9.1  9.2    GFR Estimate      >60 mL/min/1.73m2 >90  >90  >90    Potassium      3.4 - 5.3 mmol/L  4.3  4.0    Chloride      98 - 107 mmol/L  108 (H)  106    Carbon Dioxide (CO2)      22 - 29 mmol/L  22  24    Urea Nitrogen      8.0 - 23.0 mg/dL  23.6 (H)  29.0 (H)    Glucose      70 - 99 mg/dL  80  69 (L)           ASSESSMENT/PLAN:  1. General debility. Resides in LTC, needs much assist. Overall stable, no changes to care plan.  2. Hx of stroke. Cerebrovascular disease. On aspirin, statin. Right sided weakness. At baseline.  3. Dysphagia. Altered textures though ok with thin liquids.   4. HTN. Not on BP meds, continue to monitor per routine.  5. Hx ETOH abuse.       Electronically signed by: Ashley Lorenzo MD         Sincerely,        Ashley Lorenzo MD

## 2024-10-19 ENCOUNTER — TRANSFERRED RECORDS (OUTPATIENT)
Dept: HEALTH INFORMATION MANAGEMENT | Facility: CLINIC | Age: 69
End: 2024-10-19

## 2024-10-19 ENCOUNTER — LAB REQUISITION (OUTPATIENT)
Dept: LAB | Facility: CLINIC | Age: 69
End: 2024-10-19
Payer: MEDICARE

## 2024-10-19 DIAGNOSIS — R50.9 FEVER, UNSPECIFIED: ICD-10-CM

## 2024-10-19 LAB
ALBUMIN UR-MCNC: 30 MG/DL
APPEARANCE UR: CLEAR
BILIRUB UR QL STRIP: NEGATIVE
COLOR UR AUTO: YELLOW
GLUCOSE UR STRIP-MCNC: NEGATIVE MG/DL
HGB UR QL STRIP: NEGATIVE
KETONES UR STRIP-MCNC: NEGATIVE MG/DL
LEUKOCYTE ESTERASE UR QL STRIP: NEGATIVE
MUCOUS THREADS #/AREA URNS LPF: PRESENT /LPF
NITRATE UR QL: NEGATIVE
PH UR STRIP: 6 [PH] (ref 5–7)
RBC URINE: 4 /HPF
SP GR UR STRIP: 1.03 (ref 1–1.03)
SQUAMOUS EPITHELIAL: <1 /HPF
UROBILINOGEN UR STRIP-MCNC: NORMAL MG/DL
WBC URINE: 1 /HPF

## 2024-10-19 PROCEDURE — 81001 URINALYSIS AUTO W/SCOPE: CPT | Mod: ORL | Performed by: FAMILY MEDICINE

## 2024-10-21 NOTE — PROGRESS NOTES
"[facility] GERIATRICS  [facility] Record Number:  [id number]  Place of Service where encounter took place: [facility] () [[id number]]  CODE STATUS:   DNR / DNI    Chief Complaint/Reason for Visit:  Chief Complaint   Patient presents with    [facility]     [facility] 10/10/2024.        HPI:    Matheus Nieves is a 69 year old male who moved to [facility] from a different [facility] facility that he was admitted to after March 2023 hospitalization for lung abscess, concern for malignancy, aspiration, hx of prior stroke, needed placement, concern for vulnerable adult.       Today:  Seen today for routine regulatory visit. Chart reviewed. No recent medication changes. He has no complaints. Essentially bed bound. Nods or shakes most responses. No new concerns per nursing. Denies pain. Has no difficulty breathing.       PAST MEDICAL HISTORY:  Past Medical History:   Diagnosis Date    Cerebral infarction (H)     Hypertension        MEDICATIONS:  Most accurate list exists at facility.   Current Outpatient Medications   Medication Sig Dispense Refill    aspirin 81 MG EC tablet Take 81 mg by mouth daily      atorvastatin (LIPITOR) 10 MG tablet Take 10 mg by mouth daily      melatonin 1 MG TABS tablet Take 6 mg by mouth at bedtime      mirtazapine (REMERON) 45 MG tablet Take 45 mg by mouth at bedtime      polyethylene glycol (MIRALAX) 17 g packet Take 1 packet by mouth daily      senna-docusate (SENOKOT-S/PERICOLACE) 8.6-50 MG tablet Take 1 tablet by mouth daily           PHYSICAL EXAM:  General: Patient is alert male, no distress.   Vitals: /70   Pulse 70   Temp 97.1  F (36.2  C)   Resp 18   Ht 1.778 m (5' 10\")   Wt 64.1 kg (141 lb 4.8 oz)   SpO2 95%   BMI 20.27 kg/m    HEENT: Head is NCAT. Eyes show no injection or icterus. Nares negative. Oropharynx well hydrated.  Neck: No JVD.  Lungs: Non labored respirations.   : Deferred.  Extremities: No edema.  Musculoskeletal: " Right sided weakness.   Skin: Seborrhea on face.  Psych: Difficult to assess.       LABS/DIAGNOSTIC DATA:  Component      Latest Ref Rn 4/11/2023  11:15 AM 1/5/2024  5:55 AM 5/1/2024  5:49 AM   WBC      4.0 - 11.0 10e3/uL 4.3  6.9  7.9    RBC Count      4.40 - 5.90 10e6/uL 4.54  4.88  4.55    Hemoglobin      13.3 - 17.7 g/dL 13.9  14.8  14.3    Hematocrit      40.0 - 53.0 % 42.2  45.4  42.3    MCV      78 - 100 fL 93  93  93    MCH      26.5 - 33.0 pg 30.6  30.3  31.4    MCHC      31.5 - 36.5 g/dL 32.9  32.6  33.8    RDW      10.0 - 15.0 % 14.2  13.4  14.3    Platelet Count      150 - 450 10e3/uL 194  171  174      Component      Latest Ref Rn 4/11/2023  11:15 AM 1/5/2024  5:55 AM 5/1/2024  5:49 AM   Sodium      135 - 145 mmol/L 141  142  141    Anion Gap      7 - 15 mmol/L 7  12  11    Creatinine      0.67 - 1.17 mg/dL 0.82  0.86  0.89    Calcium      8.8 - 10.2 mg/dL 8.9  9.1  9.2    GFR Estimate      >60 mL/min/1.73m2 >90  >90  >90    Potassium      3.4 - 5.3 mmol/L  4.3  4.0    Chloride      98 - 107 mmol/L  108 (H)  106    Carbon Dioxide (CO2)      22 - 29 mmol/L  22  24    Urea Nitrogen      8.0 - 23.0 mg/dL  23.6 (H)  29.0 (H)    Glucose      70 - 99 mg/dL  80  69 (L)           ASSESSMENT/PLAN:  1. General debility. Resides in LTC, needs much assist. Overall stable, no changes to care plan.  2. Hx of stroke. Cerebrovascular disease. On aspirin, statin. Right sided weakness. At baseline.  3. Dysphagia. Altered textures though ok with thin liquids.   4. HTN. Not on BP meds, continue to monitor per routine.  5. Hx ETOH abuse.       Electronically signed by: Ashley Lorenzo MD

## 2024-12-06 ENCOUNTER — NURSING HOME VISIT (OUTPATIENT)
Dept: GERIATRICS | Facility: CLINIC | Age: 69
End: 2024-12-06
Payer: MEDICARE

## 2024-12-06 VITALS
HEART RATE: 66 BPM | RESPIRATION RATE: 17 BRPM | BODY MASS INDEX: 20.2 KG/M2 | WEIGHT: 141.1 LBS | SYSTOLIC BLOOD PRESSURE: 105 MMHG | HEIGHT: 70 IN | DIASTOLIC BLOOD PRESSURE: 62 MMHG | TEMPERATURE: 97.2 F | OXYGEN SATURATION: 95 %

## 2024-12-06 DIAGNOSIS — R13.10 DYSPHAGIA, UNSPECIFIED TYPE: ICD-10-CM

## 2024-12-06 DIAGNOSIS — R47.1 DYSARTHRIA AND ANARTHRIA: ICD-10-CM

## 2024-12-06 DIAGNOSIS — I10 ESSENTIAL HYPERTENSION: ICD-10-CM

## 2024-12-06 DIAGNOSIS — F10.20 ALCOHOL DEPENDENCE, UNCOMPLICATED (H): ICD-10-CM

## 2024-12-06 DIAGNOSIS — F33.9 RECURRENT MAJOR DEPRESSIVE DISORDER, REMISSION STATUS UNSPECIFIED (H): ICD-10-CM

## 2024-12-06 DIAGNOSIS — R91.8 MASS OF MIDDLE LOBE OF RIGHT LUNG: ICD-10-CM

## 2024-12-06 DIAGNOSIS — R53.81 DEBILITY: ICD-10-CM

## 2024-12-06 DIAGNOSIS — F51.01 PRIMARY INSOMNIA: ICD-10-CM

## 2024-12-06 DIAGNOSIS — R62.7 FAILURE TO THRIVE IN ADULT: ICD-10-CM

## 2024-12-06 DIAGNOSIS — K59.01 SLOW TRANSIT CONSTIPATION: ICD-10-CM

## 2024-12-06 DIAGNOSIS — B18.2 CHRONIC HEPATITIS C WITHOUT HEPATIC COMA (H): Primary | ICD-10-CM

## 2024-12-06 DIAGNOSIS — Z86.73 HISTORY OF CVA (CEREBROVASCULAR ACCIDENT): ICD-10-CM

## 2024-12-06 DIAGNOSIS — G81.91 RIGHT HEMIPARESIS (H): ICD-10-CM

## 2024-12-06 PROCEDURE — 99309 SBSQ NF CARE MODERATE MDM 30: CPT

## 2024-12-06 NOTE — LETTER
12/6/2024      Matheus Nieves  [address]  [place] [address]        [facility] GERIATRICS  Chief Complaint   Patient presents with     [facility]     [place] Medical Record Number:  [id number]  Place of Service where encounter took place:  [facility] ([facility]) [[id number]]    HPI:    Matheus Nieves  is 69 year old (1955), who is being seen today for a federally mandated E/M visit. Today's concerns are:nursing home stephy Jarvis is lying in bed during our visit.He has a history of aspiration, stroke with left sided residual weakness, anarthria and dysphasia, hep C. He was admitted to [facility] after March 2023 when a lung mass was diagnosed. Prior was living with his daughter. He is non-verbal due to anarthria but nods yes and shakes his head no to my questions today. He does not have any concerns.  Per clinical manager, he intermittently refuses cares. She reports no open areas to skin, no falls or behaviors.     ALLERGIES:Patient has no known allergies.  PAST MEDICAL HISTORY:   Past Medical History:   Diagnosis Date     Cerebral infarction (H)      Hypertension      PAST SURGICAL HISTORY:   has no past surgical history on file.  FAMILY HISTORY: family history includes Hypertension in his mother.  SOCIAL HISTORY:  reports that he quit smoking about 10 years ago. He has never used smokeless tobacco. He reports current alcohol use. He reports current drug use. Frequency: 1.00 time per week. Drug: Marijuana.    Current Outpatient Medications   Medication Sig Dispense Refill     aspirin 81 MG EC tablet Take 81 mg by mouth daily       atorvastatin (LIPITOR) 10 MG tablet Take 10 mg by mouth daily       melatonin 1 MG TABS tablet Take 6 mg by mouth at bedtime       mirtazapine (REMERON) 45 MG tablet Take 45 mg by mouth at bedtime       polyethylene glycol (MIRALAX) 17 g packet Take 1 packet by mouth daily       senna-docusate (SENOKOT-S/PERICOLACE) 8.6-50 MG tablet Take 1 tablet  "by mouth daily       No current facility-administered medications for this visit.      Case Management:  I have reviewed the care plan and MDS and do agree with the plan. Patient's desire to return to the community is currently living in a community environment. Information reviewed:  Medications, vital signs, orders, and nursing notes.    ROS  Patient denies fever, chills, headache, lightheadedness, dizziness, rhinorrhea, cough, congestion, shortness of breath, chest pain, palpitations, abdominal pain, n/v, diarrhea, constipation, change in appetite, change in sleep pattern, dysuria, frequency, burning or pain with urination.  Other than stated in HPI all other review of systems is negative.      Vital signs:/62   Pulse 66   Temp 97.2  F (36.2  C)   Resp 17   Ht 1.778 m (5' 10\")   Wt 64 kg (141 lb 1.6 oz)   SpO2 95%   BMI 20.25 kg/m     GENERAL APPEARANCE: Well developed, well nourished, in no acute distress.  LUNGS: Lung sounds CTA, no adventitious sounds, respiratory effort normal.  CARD: RRR, S1, S2, without murmurs, gallops, rubs, no JVD, peripheral pulses 2+ and symmetric  ABD: Soft, nondistended and nontender with normal bowel sounds.   MSK: Left sided weakness  EXTREMITIES: No cyanosis, clubbing or edema.  NEURO: STEPHEN. Non-verbal. Shakes head no and nods yes  PSYCH: euthymic    Lab/Diagnostic data:   Lab Results   Component Value Date    WBC 7.9 05/01/2024     Lab Results   Component Value Date    RBC 4.55 05/01/2024     Lab Results   Component Value Date    HGB 14.3 05/01/2024     Lab Results   Component Value Date    HCT 42.3 05/01/2024     Lab Results   Component Value Date    MCV 93 05/01/2024     Lab Results   Component Value Date    MCH 31.4 05/01/2024     Lab Results   Component Value Date    MCHC 33.8 05/01/2024     Lab Results   Component Value Date    RDW 14.3 05/01/2024     Lab Results   Component Value Date     05/01/2024    Last Comprehensive Metabolic Panel:  Lab Results "   Component Value Date     05/01/2024    POTASSIUM 4.0 05/01/2024    CHLORIDE 106 05/01/2024    CO2 24 05/01/2024    ANIONGAP 11 05/01/2024    GLC 69 (L) 05/01/2024    BUN 29.0 (H) 05/01/2024    CR 0.89 05/01/2024    GFRESTIMATED >90 05/01/2024    BERNARDINO 9.2 05/01/2024     Lab Results   Component Value Date    TSH 2.08 01/05/2024    TSH 1.59 03/20/2023        ASSESSMENT/PLAN  Chronic Hepatitis C without hepatic coma  Denying symptoms  March 2023 Hep C ,034. Remainder of liver related lab work ordered appears to have not completed   Unclear if hepatology follow up was completed. Will request nursing to assist with arranging follow up with hepatology if within patient's medical wishes    Alcohol dependence  Failure to thrive in adult  No reports from nursing of known recent alcohol use   Weight stable at 141 lbs     Recurrent major depressive disorder, remission status unspecified  Chronic. Flat affect, limited participation in cares/exam  Mirtazapine 45 mg daily. ACP as needed.Monitor moods and behaviors    Mass of middle lobe or right lung  Dysphagia   Unclear is he had follow up with pulmonology and recommended repeat CT. Will request nursing to assist with arranging follow up with pulmonology if within patient's medical wishes.   Declined speech consult prior  CBC due next lab day    Debility  Permanent LTC placement. Dependent for transfers and ADLs.    History of stroke  Right hemiparesis  Anarthria and dysarthria  No changes. Continue atorvastatin and asa 81    Essential Hypertension  Bps stable 100s-1 teens/60-70s not on medications  BMP due next lab day    Slow transit constipation  Stable. Continue miralax daily and senna-S daily     Insomnia  Continue melatonin 6 mg at HS    Hgb A1c and TSH ordered for yearly wellness assessment    Orders  Nursing for follow up with hepatology and pulmonology   CBC/BMP/TSH/Hgb A1c due next lab day    Electronically signed by:  JOHANNA Leal  CNP            Sincerely,        Hedy Kinney, APRN CNP

## 2024-12-06 NOTE — PROGRESS NOTES
[facility] GERIATRICS  Chief Complaint   Patient presents with    [facility]     [place] Medical Record Number:  [id number]  Place of Service where encounter took place:  [facility] ([facility]) [[id number]]    HPI:    Matheus Nieves  is 69 year old (1955), who is being seen today for a federally mandated E/M visit. Today's concerns are:nursing home stephy Jarvis is lying in bed during our visit.He has a history of aspiration, stroke with left sided residual weakness, anarthria and dysphasia, hep C. He was admitted to [facility] after March 2023 when a lung mass was diagnosed. Prior was living with his daughter. He is non-verbal due to anarthria but nods yes and shakes his head no to my questions today. He does not have any concerns.  Per clinical manager, he intermittently refuses cares. She reports no open areas to skin, no falls or behaviors.     ALLERGIES:Patient has no known allergies.  PAST MEDICAL HISTORY:   Past Medical History:   Diagnosis Date    Cerebral infarction (H)     Hypertension      PAST SURGICAL HISTORY:   has no past surgical history on file.  FAMILY HISTORY: family history includes Hypertension in his mother.  SOCIAL HISTORY:  reports that he quit smoking about 10 years ago. He has never used smokeless tobacco. He reports current alcohol use. He reports current drug use. Frequency: 1.00 time per week. Drug: Marijuana.    Current Outpatient Medications   Medication Sig Dispense Refill    aspirin 81 MG EC tablet Take 81 mg by mouth daily      atorvastatin (LIPITOR) 10 MG tablet Take 10 mg by mouth daily      melatonin 1 MG TABS tablet Take 6 mg by mouth at bedtime      mirtazapine (REMERON) 45 MG tablet Take 45 mg by mouth at bedtime      polyethylene glycol (MIRALAX) 17 g packet Take 1 packet by mouth daily      senna-docusate (SENOKOT-S/PERICOLACE) 8.6-50 MG tablet Take 1 tablet by mouth daily       No current facility-administered medications for this visit.     "  Case Management:  I have reviewed the care plan and MDS and do agree with the plan. Patient's desire to return to the community is currently living in a community environment. Information reviewed:  Medications, vital signs, orders, and nursing notes.    ROS  Patient denies fever, chills, headache, lightheadedness, dizziness, rhinorrhea, cough, congestion, shortness of breath, chest pain, palpitations, abdominal pain, n/v, diarrhea, constipation, change in appetite, change in sleep pattern, dysuria, frequency, burning or pain with urination.  Other than stated in HPI all other review of systems is negative.      Vital signs:/62   Pulse 66   Temp 97.2  F (36.2  C)   Resp 17   Ht 1.778 m (5' 10\")   Wt 64 kg (141 lb 1.6 oz)   SpO2 95%   BMI 20.25 kg/m     GENERAL APPEARANCE: Well developed, well nourished, in no acute distress.  LUNGS: Lung sounds CTA, no adventitious sounds, respiratory effort normal.  CARD: RRR, S1, S2, without murmurs, gallops, rubs, no JVD, peripheral pulses 2+ and symmetric  ABD: Soft, nondistended and nontender with normal bowel sounds.   MSK: Left sided weakness  EXTREMITIES: No cyanosis, clubbing or edema.  NEURO: STEPHEN. Non-verbal. Shakes head no and nods yes  PSYCH: euthymic    Lab/Diagnostic data:   Lab Results   Component Value Date    WBC 7.9 05/01/2024     Lab Results   Component Value Date    RBC 4.55 05/01/2024     Lab Results   Component Value Date    HGB 14.3 05/01/2024     Lab Results   Component Value Date    HCT 42.3 05/01/2024     Lab Results   Component Value Date    MCV 93 05/01/2024     Lab Results   Component Value Date    MCH 31.4 05/01/2024     Lab Results   Component Value Date    MCHC 33.8 05/01/2024     Lab Results   Component Value Date    RDW 14.3 05/01/2024     Lab Results   Component Value Date     05/01/2024    Last Comprehensive Metabolic Panel:  Lab Results   Component Value Date     05/01/2024    POTASSIUM 4.0 05/01/2024    CHLORIDE 106 " 05/01/2024    CO2 24 05/01/2024    ANIONGAP 11 05/01/2024    GLC 69 (L) 05/01/2024    BUN 29.0 (H) 05/01/2024    CR 0.89 05/01/2024    GFRESTIMATED >90 05/01/2024    BERNARDINO 9.2 05/01/2024     Lab Results   Component Value Date    TSH 2.08 01/05/2024    TSH 1.59 03/20/2023        ASSESSMENT/PLAN  Chronic Hepatitis C without hepatic coma  Denying symptoms  March 2023 Hep C ,034. Remainder of liver related lab work ordered appears to have not completed   Unclear if hepatology follow up was completed. Will request nursing to assist with arranging follow up with hepatology if within patient's medical wishes    Alcohol dependence  Failure to thrive in adult  No reports from nursing of known recent alcohol use   Weight stable at 141 lbs     Recurrent major depressive disorder, remission status unspecified  Chronic. Flat affect, limited participation in cares/exam  Mirtazapine 45 mg daily. ACP as needed.Monitor moods and behaviors    Mass of middle lobe or right lung  Dysphagia   Unclear is he had follow up with pulmonology and recommended repeat CT. Will request nursing to assist with arranging follow up with pulmonology if within patient's medical wishes.   Declined speech consult prior  CBC due next lab day    Debility  Permanent LTC placement. Dependent for transfers and ADLs.    History of stroke  Right hemiparesis  Anarthria and dysarthria  No changes. Continue atorvastatin and asa 81    Essential Hypertension  Bps stable 100s-1 teens/60-70s not on medications  BMP due next lab day    Slow transit constipation  Stable. Continue miralax daily and senna-S daily     Insomnia  Continue melatonin 6 mg at HS    Hgb A1c and TSH ordered for yearly wellness assessment    Orders  Nursing for follow up with hepatology and pulmonology   CBC/BMP/TSH/Hgb A1c due next lab day    Electronically signed by:  JOHANNA Leal CNP

## 2024-12-07 ENCOUNTER — LAB REQUISITION (OUTPATIENT)
Dept: LAB | Facility: CLINIC | Age: 69
End: 2024-12-07
Payer: MEDICARE

## 2024-12-07 DIAGNOSIS — I10 ESSENTIAL (PRIMARY) HYPERTENSION: ICD-10-CM

## 2024-12-09 LAB
ANION GAP SERPL CALCULATED.3IONS-SCNC: 11 MMOL/L (ref 7–15)
BUN SERPL-MCNC: 27.4 MG/DL (ref 8–23)
CALCIUM SERPL-MCNC: 9.3 MG/DL (ref 8.8–10.4)
CHLORIDE SERPL-SCNC: 108 MMOL/L (ref 98–107)
CREAT SERPL-MCNC: 0.86 MG/DL (ref 0.67–1.17)
EGFRCR SERPLBLD CKD-EPI 2021: >90 ML/MIN/1.73M2
ERYTHROCYTE [DISTWIDTH] IN BLOOD BY AUTOMATED COUNT: 14.6 % (ref 10–15)
EST. AVERAGE GLUCOSE BLD GHB EST-MCNC: 111 MG/DL
GLUCOSE SERPL-MCNC: 117 MG/DL (ref 70–99)
HBA1C MFR BLD: 5.5 %
HCO3 SERPL-SCNC: 23 MMOL/L (ref 22–29)
HCT VFR BLD AUTO: 47.1 % (ref 40–53)
HGB BLD-MCNC: 15.1 G/DL (ref 13.3–17.7)
MCH RBC QN AUTO: 30 PG (ref 26.5–33)
MCHC RBC AUTO-ENTMCNC: 32.1 G/DL (ref 31.5–36.5)
MCV RBC AUTO: 94 FL (ref 78–100)
PLATELET # BLD AUTO: 161 10E3/UL (ref 150–450)
POTASSIUM SERPL-SCNC: 4.3 MMOL/L (ref 3.4–5.3)
RBC # BLD AUTO: 5.04 10E6/UL (ref 4.4–5.9)
SODIUM SERPL-SCNC: 142 MMOL/L (ref 135–145)
TSH SERPL DL<=0.005 MIU/L-ACNC: 2.44 UIU/ML (ref 0.3–4.2)
WBC # BLD AUTO: 6.9 10E3/UL (ref 4–11)

## 2025-02-06 ENCOUNTER — LAB REQUISITION (OUTPATIENT)
Dept: LAB | Facility: CLINIC | Age: 70
End: 2025-02-06
Payer: MEDICARE

## 2025-02-06 DIAGNOSIS — N18.9 CHRONIC KIDNEY DISEASE, UNSPECIFIED: ICD-10-CM

## 2025-02-07 LAB
ANION GAP SERPL CALCULATED.3IONS-SCNC: 14 MMOL/L (ref 7–15)
BUN SERPL-MCNC: 23.4 MG/DL (ref 8–23)
CALCIUM SERPL-MCNC: 9 MG/DL (ref 8.8–10.4)
CHLORIDE SERPL-SCNC: 104 MMOL/L (ref 98–107)
CREAT SERPL-MCNC: 0.78 MG/DL (ref 0.67–1.17)
EGFRCR SERPLBLD CKD-EPI 2021: >90 ML/MIN/1.73M2
GLUCOSE SERPL-MCNC: 117 MG/DL (ref 70–99)
HCO3 SERPL-SCNC: 22 MMOL/L (ref 22–29)
POTASSIUM SERPL-SCNC: 4.1 MMOL/L (ref 3.4–5.3)
SODIUM SERPL-SCNC: 140 MMOL/L (ref 135–145)

## 2025-02-07 PROCEDURE — 80048 BASIC METABOLIC PNL TOTAL CA: CPT | Mod: ORL | Performed by: FAMILY MEDICINE

## 2025-02-07 PROCEDURE — 36415 COLL VENOUS BLD VENIPUNCTURE: CPT | Mod: ORL | Performed by: FAMILY MEDICINE

## 2025-02-07 PROCEDURE — P9604 ONE-WAY ALLOW PRORATED TRIP: HCPCS | Mod: ORL | Performed by: FAMILY MEDICINE

## 2025-02-20 ENCOUNTER — NURSING HOME VISIT (OUTPATIENT)
Dept: GERIATRICS | Facility: CLINIC | Age: 70
End: 2025-02-20
Payer: MEDICARE

## 2025-02-20 VITALS
DIASTOLIC BLOOD PRESSURE: 81 MMHG | HEIGHT: 70 IN | HEART RATE: 71 BPM | RESPIRATION RATE: 18 BRPM | TEMPERATURE: 98.8 F | WEIGHT: 137.9 LBS | OXYGEN SATURATION: 93 % | SYSTOLIC BLOOD PRESSURE: 112 MMHG | BODY MASS INDEX: 19.74 KG/M2

## 2025-02-20 DIAGNOSIS — Z86.73 HISTORY OF CVA (CEREBROVASCULAR ACCIDENT): ICD-10-CM

## 2025-02-20 DIAGNOSIS — R53.81 DEBILITY: Primary | ICD-10-CM

## 2025-02-20 DIAGNOSIS — R13.10 DYSPHAGIA, UNSPECIFIED TYPE: ICD-10-CM

## 2025-02-20 DIAGNOSIS — I10 ESSENTIAL HYPERTENSION: ICD-10-CM

## 2025-02-20 PROCEDURE — 99309 SBSQ NF CARE MODERATE MDM 30: CPT | Performed by: FAMILY MEDICINE

## 2025-02-20 NOTE — LETTER
2/20/2025      Matheus Nieves  [address]  [place] [address]        No notes on file      Sincerely,        Ashley Lorenzo MD    Electronically signed   no injection or icterus. Nares negative. Oropharynx well hydrated.  Neck: No JVD.  Lungs: Non labored respirations.   : Deferred.  Extremities: No edema.  Musculoskeletal: Right sided weakness.   Skin: Seborrhea on face.  Psych: Difficult to assess.       LABS/DIAGNOSTIC DATA:  Component      Latest Ref [facility] 4/11/2023  11:15 AM 1/5/2024  5:55 AM 5/1/2024  5:49 AM   WBC      4.0 - 11.0 10e3/uL 4.3  6.9  7.9    RBC Count      4.40 - 5.90 10e6/uL 4.54  4.88  4.55    Hemoglobin      13.3 - 17.7 g/dL 13.9  14.8  14.3    Hematocrit      40.0 - 53.0 % 42.2  45.4  42.3    MCV      78 - 100 fL 93  93  93    MCH      26.5 - 33.0 pg 30.6  30.3  31.4    MCHC      31.5 - 36.5 g/dL 32.9  32.6  33.8    RDW      10.0 - 15.0 % 14.2  13.4  14.3    Platelet Count      150 - 450 10e3/uL 194  171  174      Component      Latest Ref [facility] 4/11/2023  11:15 AM 1/5/2024  5:55 AM 5/1/2024  5:49 AM   Sodium      135 - 145 mmol/L 141  142  141    Anion Gap      7 - 15 mmol/L 7  12  11    Creatinine      0.67 - 1.17 mg/dL 0.82  0.86  0.89    Calcium      8.8 - 10.2 mg/dL 8.9  9.1  9.2    GFR Estimate      >60 mL/min/1.73m2 >90  >90  >90    Potassium      3.4 - 5.3 mmol/L  4.3  4.0    Chloride      98 - 107 mmol/L  108 (H)  106    Carbon Dioxide (CO2)      22 - 29 mmol/L  22  24    Urea Nitrogen      8.0 - 23.0 mg/dL  23.6 (H)  29.0 (H)    Glucose      70 - 99 mg/dL  80  69 (L)           ASSESSMENT/PLAN:  1. General debility. Overall stable, no changes to LTC plan.   2. Hx of stroke. Cerebrovascular disease. On aspirin, statin. Right sided weakness.   3. Dysphagia. Altered textures though ok with thin liquids.   4. HTN. Not on BP meds, continue to monitor per routine.  5. Hx ETOH abuse. Resides in LTC, no concerns.       Electronically signed by: Ashley Lorenzo MD         Sincerely,        Ashley Lorenzo MD    Electronically signed

## 2025-03-03 NOTE — PROGRESS NOTES
"[facility] GERIATRICS  [place] Medical Record Number:  [id number]  Place of Service where encounter took place: [facility] () [[id number]]  CODE STATUS:   DNR / DNI    Chief Complaint/Reason for Visit:  Chief Complaint   Patient presents with    [facility] 2/20/2025.        HPI:    Matheus Nieves is a 70 year old male who moved to [facility] from a different [facility] facility that he was admitted to after March 2023 hospitalization for lung abscess, concern for malignancy, aspiration, hx of prior stroke, needed placement, concern for vulnerable adult.       Today:  He is seen today for regulatory review. In his room, in bed as usual. Smiled when approached but no verbal answers. Shakes head yes or no. No acute concerns per nursing. No recent illness. Appears comfortable. No medication changes recently. No fever, cough or congestion. No change in bowel or bladder habits.       PAST MEDICAL HISTORY:  Past Medical History:   Diagnosis Date    Cerebral infarction (H)     Hypertension        MEDICATIONS:  Most accurate list exists at facility.   Current Outpatient Medications   Medication Sig Dispense Refill    aspirin 81 MG EC tablet Take 81 mg by mouth daily      atorvastatin (LIPITOR) 10 MG tablet Take 10 mg by mouth daily      melatonin 1 MG TABS tablet Take 6 mg by mouth at bedtime      mirtazapine (REMERON) 45 MG tablet Take 45 mg by mouth at bedtime      polyethylene glycol (MIRALAX) 17 g packet Take 1 packet by mouth daily      senna-docusate (SENOKOT-S/PERICOLACE) 8.6-50 MG tablet Take 1 tablet by mouth daily           PHYSICAL EXAM: Exam unchanged.   General: Patient is alert male, no distress.   Vitals: /81   Pulse 71   Temp 98.8  F (37.1  C)   Resp 18   Ht 1.778 m (5' 10\")   Wt 62.6 kg (137 lb 14.4 oz)   SpO2 93%   BMI 19.79 kg/m    HEENT: Head is NCAT. Eyes show no injection or icterus. Nares negative. Oropharynx well hydrated.  Neck: No " JVD.  Lungs: Non labored respirations.   : Deferred.  Extremities: No edema.  Musculoskeletal: Right sided weakness.   Skin: Seborrhea on face.  Psych: Difficult to assess.       LABS/DIAGNOSTIC DATA:  Component      Latest Ref Rn 4/11/2023  11:15 AM 1/5/2024  5:55 AM 5/1/2024  5:49 AM   WBC      4.0 - 11.0 10e3/uL 4.3  6.9  7.9    RBC Count      4.40 - 5.90 10e6/uL 4.54  4.88  4.55    Hemoglobin      13.3 - 17.7 g/dL 13.9  14.8  14.3    Hematocrit      40.0 - 53.0 % 42.2  45.4  42.3    MCV      78 - 100 fL 93  93  93    MCH      26.5 - 33.0 pg 30.6  30.3  31.4    MCHC      31.5 - 36.5 g/dL 32.9  32.6  33.8    RDW      10.0 - 15.0 % 14.2  13.4  14.3    Platelet Count      150 - 450 10e3/uL 194  171  174      Component      Latest Ref Rn 4/11/2023  11:15 AM 1/5/2024  5:55 AM 5/1/2024  5:49 AM   Sodium      135 - 145 mmol/L 141  142  141    Anion Gap      7 - 15 mmol/L 7  12  11    Creatinine      0.67 - 1.17 mg/dL 0.82  0.86  0.89    Calcium      8.8 - 10.2 mg/dL 8.9  9.1  9.2    GFR Estimate      >60 mL/min/1.73m2 >90  >90  >90    Potassium      3.4 - 5.3 mmol/L  4.3  4.0    Chloride      98 - 107 mmol/L  108 (H)  106    Carbon Dioxide (CO2)      22 - 29 mmol/L  22  24    Urea Nitrogen      8.0 - 23.0 mg/dL  23.6 (H)  29.0 (H)    Glucose      70 - 99 mg/dL  80  69 (L)           ASSESSMENT/PLAN:  1. General debility. Overall stable, no changes to LTC plan.   2. Hx of stroke. Cerebrovascular disease. On aspirin, statin. Right sided weakness.   3. Dysphagia. Altered textures though ok with thin liquids.   4. HTN. Not on BP meds, continue to monitor per routine.  5. Hx ETOH abuse. Resides in LTC, no concerns.       Electronically signed by: Ashley Lorenzo MD

## 2025-03-05 ENCOUNTER — NURSING HOME VISIT (OUTPATIENT)
Dept: GERIATRICS | Facility: CLINIC | Age: 70
End: 2025-03-05
Payer: MEDICARE

## 2025-03-05 ENCOUNTER — LAB REQUISITION (OUTPATIENT)
Dept: LAB | Facility: CLINIC | Age: 70
End: 2025-03-05
Payer: MEDICARE

## 2025-03-05 VITALS
BODY MASS INDEX: 19.74 KG/M2 | HEIGHT: 70 IN | HEART RATE: 82 BPM | SYSTOLIC BLOOD PRESSURE: 121 MMHG | DIASTOLIC BLOOD PRESSURE: 83 MMHG | WEIGHT: 137.9 LBS | TEMPERATURE: 97.9 F | RESPIRATION RATE: 16 BRPM | OXYGEN SATURATION: 92 %

## 2025-03-05 DIAGNOSIS — R62.7 FAILURE TO THRIVE IN ADULT: ICD-10-CM

## 2025-03-05 DIAGNOSIS — R13.10 DYSPHAGIA, UNSPECIFIED TYPE: ICD-10-CM

## 2025-03-05 DIAGNOSIS — E87.6 HYPOKALEMIA: ICD-10-CM

## 2025-03-05 DIAGNOSIS — R91.8 MASS OF MIDDLE LOBE OF RIGHT LUNG: ICD-10-CM

## 2025-03-05 DIAGNOSIS — R47.1 DYSARTHRIA AND ANARTHRIA: ICD-10-CM

## 2025-03-05 DIAGNOSIS — I10 ESSENTIAL HYPERTENSION: ICD-10-CM

## 2025-03-05 DIAGNOSIS — F33.9 RECURRENT MAJOR DEPRESSIVE DISORDER, REMISSION STATUS UNSPECIFIED: ICD-10-CM

## 2025-03-05 DIAGNOSIS — Z71.89 GOALS OF CARE, COUNSELING/DISCUSSION: ICD-10-CM

## 2025-03-05 DIAGNOSIS — Z86.73 HISTORY OF CVA (CEREBROVASCULAR ACCIDENT): ICD-10-CM

## 2025-03-05 DIAGNOSIS — J21.0 RSV BRONCHIOLITIS: Primary | ICD-10-CM

## 2025-03-05 DIAGNOSIS — G81.91 RIGHT HEMIPARESIS (H): ICD-10-CM

## 2025-03-05 DIAGNOSIS — I10 ESSENTIAL (PRIMARY) HYPERTENSION: ICD-10-CM

## 2025-03-05 PROBLEM — F19.90 OTHER PSYCHOACTIVE SUBSTANCE USE, UNSPECIFIED, UNCOMPLICATED: Status: ACTIVE | Noted: 2025-03-05

## 2025-03-05 PROBLEM — J85.2: Status: ACTIVE | Noted: 2023-04-15

## 2025-03-05 PROBLEM — G93.41 ENCEPHALOPATHY IN SEPSIS: Status: ACTIVE | Noted: 2025-03-01

## 2025-03-05 PROBLEM — F51.02 ADJUSTMENT INSOMNIA: Status: ACTIVE | Noted: 2023-04-15

## 2025-03-05 PROBLEM — R65.20 SEVERE SEPSIS WITHOUT SEPTIC SHOCK (H): Status: ACTIVE | Noted: 2025-03-01

## 2025-03-05 PROBLEM — F10.11 ALCOHOL ABUSE, IN REMISSION: Status: ACTIVE | Noted: 2023-04-15

## 2025-03-05 PROBLEM — R41.89 OTHER SYMPTOMS AND SIGNS INVOLVING COGNITIVE FUNCTIONS AND AWARENESS: Status: ACTIVE | Noted: 2023-04-15

## 2025-03-05 PROBLEM — A41.89 VIRAL SEPSIS (H): Status: ACTIVE | Noted: 2025-02-28

## 2025-03-05 PROBLEM — B97.89 VIRAL SEPSIS (H): Status: ACTIVE | Noted: 2025-02-28

## 2025-03-05 PROBLEM — B18.9: Status: ACTIVE | Noted: 2023-04-17

## 2025-03-05 PROBLEM — I69.30 HISTORY OF STROKE WITH RESIDUAL DEFICIT: Status: ACTIVE | Noted: 2025-03-01

## 2025-03-05 PROBLEM — L89.159 PRESSURE ULCER OF SACRAL REGION, UNSPECIFIED STAGE: Status: ACTIVE | Noted: 2023-04-17

## 2025-03-05 PROBLEM — I63.9 CEREBRAL INFARCTION (H): Status: ACTIVE | Noted: 2025-03-05

## 2025-03-05 PROBLEM — B19.20 UNSPECIFIED VIRAL HEPATITIS C WITHOUT HEPATIC COMA: Status: ACTIVE | Noted: 2023-04-15

## 2025-03-05 PROBLEM — M72.0 CONTRACTURE OF PALMAR FASCIA: Status: ACTIVE | Noted: 2025-03-05

## 2025-03-05 PROBLEM — E78.5 HYPERLIPIDEMIA, UNSPECIFIED: Status: ACTIVE | Noted: 2023-04-17

## 2025-03-05 PROBLEM — A41.9 SEVERE SEPSIS WITHOUT SEPTIC SHOCK (H): Status: ACTIVE | Noted: 2025-03-01

## 2025-03-05 PROBLEM — N17.9 AKI (ACUTE KIDNEY INJURY): Status: ACTIVE | Noted: 2025-03-01

## 2025-03-05 PROBLEM — B33.8 RSV (RESPIRATORY SYNCYTIAL VIRUS INFECTION): Status: ACTIVE | Noted: 2025-02-28

## 2025-03-05 PROBLEM — T17.908D: Status: ACTIVE | Noted: 2023-04-15

## 2025-03-05 PROBLEM — B37.9 CANDIDIASIS, UNSPECIFIED: Status: ACTIVE | Noted: 2023-04-17

## 2025-03-05 RX ORDER — ONDANSETRON 4 MG/1
4 TABLET, FILM COATED ORAL EVERY 6 HOURS PRN
COMMUNITY

## 2025-03-05 NOTE — LETTER
3/5/2025      Matheus Nieves  [address]  [place] [address]        [facility] GERIATRICS    PRIMARY CARE PROVIDER AND CLINIC:  JOHANNA Leal CNP, [address] [facility] / [place] [address]  Chief Complaint   Patient presents with     Hospital F/U      [place] Medical Record Number:  [id number]  Place of Service where encounter took place:  [facility] () [[id number]]    Matheus Nieves  is a 70 year old  (1955), admitted to the above facility from  [facility] . Hospital stay 2/28/2025 through 3/4/2025.. PMHx includes prior CVA, essential hypertension, tobacco use disorder, anxiety, depression, insomnia, cognitive impairment, alcohol use disorder     HPI  Brief Hospital Course Summary:  Matheus was transferred to the ED from [facility] for fever and altered mental status. He was found to be profoundly hypotensive. Received fluid resuscitation and IV Vanco and Zosyn given sepsis. Found to be positive for RSV. Baseline dysphagia worsened during hospitalization. Discussion around feeding tube was had, ultimately patient refused. Discussed that his poor nutritional intake will yield a poor prognosis. Community palliative referral was placed.     Today Mario is lying comfortably in bed. Reviewed his hospitalization with him. Through whispers and gestures he shares that he does not like the thickened liquids and has not been eating/drinking much. We discussed that he did not want the tube feeding. Confirmed he is DNR/DNI. Discussed the option of hospice which would allow him to drink thin liquids as quality of life would be the priority over longevity. He confirms that he would like to visit with them to get more information about if he is eligible/ what services they offer. I had a lengthy conversation with SHAHANA Conrad with the clinical nurse manager present and it was agreed upon that hospice could come and visit with the patient and Anamaria. Today he denies pain, constipation,  "burning/pain with urination, shortness of breath, chest pain, palpitations, dizziness, lightheadedness.     CODE STATUS/ADVANCE DIRECTIVES DISCUSSION:  Prior  DNR / DNI  ALLERGIES:   Allergies   Allergen Reactions     Vancomycin Other (See Comments)     'Red man syndrome'      PAST MEDICAL HISTORY:   Past Medical History:   Diagnosis Date     Cerebral infarction (H)      Hypertension       PAST SURGICAL HISTORY:   has no past surgical history on file.  FAMILY HISTORY: family history includes Hypertension in his mother.  SOCIAL HISTORY:   reports that he quit smoking about 10 years ago. He has never used smokeless tobacco. He reports current alcohol use. He reports current drug use. Frequency: 1.00 time per week. Drug: Marijuana.  Patient's living condition: lives in a skilled nursing facility    Current Outpatient Medications   Medication Sig Dispense Refill     aspirin 81 MG EC tablet Take 81 mg by mouth daily       atorvastatin (LIPITOR) 10 MG tablet Take 10 mg by mouth daily       melatonin 1 MG TABS tablet Take 6 mg by mouth at bedtime       mirtazapine (REMERON) 45 MG tablet Take 45 mg by mouth at bedtime       ondansetron (ZOFRAN) 4 MG tablet Take 4 mg by mouth every 6 hours as needed for nausea.       polyethylene glycol (MIRALAX) 17 g packet Take 1 packet by mouth daily       senna-docusate (SENOKOT-S/PERICOLACE) 8.6-50 MG tablet Take 1 tablet by mouth daily       No current facility-administered medications for this visit.      ROS:  Unobtainable secondary to aphasia.     Vital signs:/83   Pulse 82   Temp 97.9  F (36.6  C)   Resp 16   Ht 1.778 m (5' 10\")   Wt 62.6 kg (137 lb 14.4 oz)   SpO2 92%   BMI 19.79 kg/m     GENERAL APPEARANCE: Chronically ill  LUNGS: Course lung sounds  CARD: RRR, S1, S2, without murmurs, gallops, rubs, no JVD, peripheral pulses 2+ and symmetric  ABD: Soft, nondistended and nontender with normal bowel sounds.   MSK: Left sided residual weakness  EXTREMITIES: No " cyanosis, clubbing or edema.  NEURO: Whispers yes/no, shakes head no, nods head yes  PSYCH: euthymic     Lab/Diagnostic data:  Labs reviewed in EPIC by me BMP, CBC, mag    ASSESSMENT/PLAN:  RSV  Goals of care counseling  Dysphagia  History of stroke  Right hemiparesis  Anarthria and dysarthria  Failure to thrive, adult  Mass of middle lobe or right lung  Dysphagia worsened with virus. Declined feeding tube. Does not like thickened liquids, historically has declined speech consult in LTC  Open to hospice informational meeting- see HPI for discussion around quality of life  Continue with supportive cares  Continue atorvastatin and asa 81  137 lbs in hospital     Recurrent major depressive disorder, remission status unspecified  Chronic. Flat affect  Mirtazapine 45 mg daily     Essential Hypertension  Was previously on amlodipine   Bps stable 1teens-130s/60-70s without medications    Hypokalemia  3/4/25 K+ 3.1  BMP due 3/10/235    Orders:  Hospice consult  BMP due 3/10-/25    >45 minutes spent assessing patient, providing education to patient and family, reviewing records from recent hospitalization at [facility], collaborating with nursing, developing plan of care.     Electronically signed by:  JOHANNA Leal CNP on 3/6/2025 at 9:21 AM                       Sincerely,        JOHANNA Leal CNP    Electronically signed

## 2025-03-05 NOTE — PROGRESS NOTES
[facility] GERIATRICS    PRIMARY CARE PROVIDER AND CLINIC:  JOHANNA Leal CNP, [address] / [place] [address]  Chief Complaint   Patient presents with    Hospital F/U      [place] Medical Record Number:  [id number]  Place of Service where encounter took place:  [facility] () [[id number]]    Matheus Nieves  is a 70 year old  (1955), admitted to the above facility from  [facility] . Hospital stay 2/28/2025 through 3/4/2025.. PMHx includes prior CVA, essential hypertension, tobacco use disorder, anxiety, depression, insomnia, cognitive impairment, alcohol use disorder     HPI  Brief Hospital Course Summary:  Matheus was transferred to the ED from [facility] for fever and altered mental status. He was found to be profoundly hypotensive. Received fluid resuscitation and IV Vanco and Zosyn given sepsis. Found to be positive for RSV. Baseline dysphagia worsened during hospitalization. Discussion around feeding tube was had, ultimately patient refused. Discussed that his poor nutritional intake will yield a poor prognosis. Community palliative referral was placed.     Today Mario is lying comfortably in bed. Reviewed his hospitalization with him. Through whispers and gestures he shares that he does not like the thickened liquids and has not been eating/drinking much. We discussed that he did not want the tube feeding. Confirmed he is DNR/DNI. Discussed the option of hospice which would allow him to drink thin liquids as quality of life would be the priority over longevity. He confirms that he would like to visit with them to get more information about if he is eligible/ what services they offer. I had a lengthy conversation with SHAHANA Conrad with the clinical nurse manager present and it was agreed upon that hospice could come and visit with the patient and Anamaria. Today he denies pain, constipation, burning/pain with urination, shortness of breath, chest pain, palpitations,  "dizziness, lightheadedness.     CODE STATUS/ADVANCE DIRECTIVES DISCUSSION:  Prior  DNR / DNI  ALLERGIES:   Allergies   Allergen Reactions    Vancomycin Other (See Comments)     'Red man syndrome'      PAST MEDICAL HISTORY:   Past Medical History:   Diagnosis Date    Cerebral infarction (H)     Hypertension       PAST SURGICAL HISTORY:   has no past surgical history on file.  FAMILY HISTORY: family history includes Hypertension in his mother.  SOCIAL HISTORY:   reports that he quit smoking about 10 years ago. He has never used smokeless tobacco. He reports current alcohol use. He reports current drug use. Frequency: 1.00 time per week. Drug: Marijuana.  Patient's living condition: lives in a skilled nursing facility    Current Outpatient Medications   Medication Sig Dispense Refill    aspirin 81 MG EC tablet Take 81 mg by mouth daily      atorvastatin (LIPITOR) 10 MG tablet Take 10 mg by mouth daily      melatonin 1 MG TABS tablet Take 6 mg by mouth at bedtime      mirtazapine (REMERON) 45 MG tablet Take 45 mg by mouth at bedtime      ondansetron (ZOFRAN) 4 MG tablet Take 4 mg by mouth every 6 hours as needed for nausea.      polyethylene glycol (MIRALAX) 17 g packet Take 1 packet by mouth daily      senna-docusate (SENOKOT-S/PERICOLACE) 8.6-50 MG tablet Take 1 tablet by mouth daily       No current facility-administered medications for this visit.      ROS:  Unobtainable secondary to aphasia.     Vital signs:/83   Pulse 82   Temp 97.9  F (36.6  C)   Resp 16   Ht 1.778 m (5' 10\")   Wt 62.6 kg (137 lb 14.4 oz)   SpO2 92%   BMI 19.79 kg/m     GENERAL APPEARANCE: Chronically ill  LUNGS: Course lung sounds  CARD: RRR, S1, S2, without murmurs, gallops, rubs, no JVD, peripheral pulses 2+ and symmetric  ABD: Soft, nondistended and nontender with normal bowel sounds.   MSK: Left sided residual weakness  EXTREMITIES: No cyanosis, clubbing or edema.  NEURO: Whispers yes/no, shakes head no, nods head yes  PSYCH: " euthymic     Lab/Diagnostic data:  Labs reviewed in EPIC by me BMP, CBC, mag    ASSESSMENT/PLAN:  RSV  Goals of care counseling  Dysphagia  History of stroke  Right hemiparesis  Anarthria and dysarthria  Failure to thrive, adult  Mass of middle lobe or right lung  Dysphagia worsened with virus. Declined feeding tube. Does not like thickened liquids, historically has declined speech consult in LTC  Open to hospice informational meeting- see HPI for discussion around quality of life  Continue with supportive cares  Continue atorvastatin and asa 81  137 lbs in hospital     Recurrent major depressive disorder, remission status unspecified  Chronic. Flat affect  Mirtazapine 45 mg daily     Essential Hypertension  Was previously on amlodipine   Bps stable 1teens-130s/60-70s without medications    Hypokalemia  3/4/25 K+ 3.1  BMP due 3/10/235    Orders:  Hospice consult  BMP due 3/10-/25    >45 minutes spent assessing patient, providing education to patient and family, reviewing records from recent hospitalization at [facility], collaborating with nursing, developing plan of care.     Electronically signed by:  JOHANNA Leal CNP on 3/6/2025 at 9:21 AM

## 2025-03-06 ENCOUNTER — LAB REQUISITION (OUTPATIENT)
Dept: LAB | Facility: CLINIC | Age: 70
End: 2025-03-06
Payer: MEDICARE

## 2025-03-06 DIAGNOSIS — I10 ESSENTIAL (PRIMARY) HYPERTENSION: ICD-10-CM

## 2025-03-07 LAB
ANION GAP SERPL CALCULATED.3IONS-SCNC: 11 MMOL/L (ref 7–15)
BUN SERPL-MCNC: 9 MG/DL (ref 8–23)
CALCIUM SERPL-MCNC: 8.3 MG/DL (ref 8.8–10.4)
CHLORIDE SERPL-SCNC: 106 MMOL/L (ref 98–107)
CREAT SERPL-MCNC: 0.66 MG/DL (ref 0.67–1.17)
EGFRCR SERPLBLD CKD-EPI 2021: >90 ML/MIN/1.73M2
GLUCOSE SERPL-MCNC: 81 MG/DL (ref 70–99)
HCO3 SERPL-SCNC: 24 MMOL/L (ref 22–29)
POTASSIUM SERPL-SCNC: 3.5 MMOL/L (ref 3.4–5.3)
SODIUM SERPL-SCNC: 141 MMOL/L (ref 135–145)

## 2025-03-07 PROCEDURE — P9604 ONE-WAY ALLOW PRORATED TRIP: HCPCS | Mod: ORL | Performed by: FAMILY MEDICINE

## 2025-03-07 PROCEDURE — 80048 BASIC METABOLIC PNL TOTAL CA: CPT | Mod: ORL | Performed by: FAMILY MEDICINE

## 2025-03-07 PROCEDURE — 36415 COLL VENOUS BLD VENIPUNCTURE: CPT | Mod: ORL | Performed by: FAMILY MEDICINE

## 2025-03-10 LAB
ANION GAP SERPL CALCULATED.3IONS-SCNC: 9 MMOL/L (ref 7–15)
BUN SERPL-MCNC: 10.5 MG/DL (ref 8–23)
CALCIUM SERPL-MCNC: 8.5 MG/DL (ref 8.8–10.4)
CHLORIDE SERPL-SCNC: 104 MMOL/L (ref 98–107)
CREAT SERPL-MCNC: 0.63 MG/DL (ref 0.67–1.17)
EGFRCR SERPLBLD CKD-EPI 2021: >90 ML/MIN/1.73M2
GLUCOSE SERPL-MCNC: 93 MG/DL (ref 70–99)
HCO3 SERPL-SCNC: 26 MMOL/L (ref 22–29)
POTASSIUM SERPL-SCNC: 3.5 MMOL/L (ref 3.4–5.3)
SODIUM SERPL-SCNC: 139 MMOL/L (ref 135–145)

## 2025-03-10 PROCEDURE — P9604 ONE-WAY ALLOW PRORATED TRIP: HCPCS | Mod: ORL

## 2025-03-10 PROCEDURE — 80048 BASIC METABOLIC PNL TOTAL CA: CPT | Mod: ORL

## 2025-03-10 PROCEDURE — 36415 COLL VENOUS BLD VENIPUNCTURE: CPT | Mod: ORL

## 2025-04-08 ENCOUNTER — NURSING HOME VISIT (OUTPATIENT)
Dept: GERIATRICS | Facility: CLINIC | Age: 70
End: 2025-04-08
Payer: MEDICARE

## 2025-04-08 VITALS
HEART RATE: 65 BPM | RESPIRATION RATE: 14 BRPM | WEIGHT: 134.8 LBS | SYSTOLIC BLOOD PRESSURE: 109 MMHG | OXYGEN SATURATION: 92 % | BODY MASS INDEX: 19.34 KG/M2 | DIASTOLIC BLOOD PRESSURE: 78 MMHG | TEMPERATURE: 98.2 F

## 2025-04-08 DIAGNOSIS — Z51.5 HOSPICE CARE PATIENT: Primary | ICD-10-CM

## 2025-04-08 DIAGNOSIS — R91.8 MASS OF MIDDLE LOBE OF RIGHT LUNG: ICD-10-CM

## 2025-04-08 DIAGNOSIS — F33.9 RECURRENT MAJOR DEPRESSIVE DISORDER, REMISSION STATUS UNSPECIFIED: ICD-10-CM

## 2025-04-08 DIAGNOSIS — Z86.73 HISTORY OF CVA (CEREBROVASCULAR ACCIDENT): ICD-10-CM

## 2025-04-08 DIAGNOSIS — R13.10 DYSPHAGIA, UNSPECIFIED TYPE: ICD-10-CM

## 2025-04-08 DIAGNOSIS — R62.7 FAILURE TO THRIVE IN ADULT: ICD-10-CM

## 2025-04-08 DIAGNOSIS — R47.1 DYSARTHRIA AND ANARTHRIA: ICD-10-CM

## 2025-04-08 DIAGNOSIS — G81.91 RIGHT HEMIPARESIS (H): ICD-10-CM

## 2025-04-08 PROCEDURE — 99309 SBSQ NF CARE MODERATE MDM 30: CPT

## 2025-04-08 RX ORDER — HYDROMORPHONE HYDROCHLORIDE 2 MG/1
2 TABLET ORAL EVERY 8 HOURS
COMMUNITY

## 2025-04-08 RX ORDER — HYOSCYAMINE SULFATE 0.12 MG/1
0.12 TABLET ORAL EVERY 4 HOURS PRN
COMMUNITY

## 2025-04-08 RX ORDER — HYDROMORPHONE HYDROCHLORIDE 1 MG/ML
1 SOLUTION ORAL
COMMUNITY

## 2025-04-08 NOTE — PROGRESS NOTES
[facility] GERIATRICS  Chief Complaint   Patient presents with    FDC Regulatory     [place] Medical Record Number:  [id number]  Place of Service where encounter took place:  [facility] () [[id number]]    HPI:    Matheus Nieves  is 70 year old (1955), who is being seen today for a federally mandated E/M visit. Today's concerns are: none    Mario is lying comfortably in bed. He is currently on hospice. Communicates with me today with getsures and whispers. He denies pain. He is happy to be able to eat and drink consistencies that he likes. Sleeping well. Mood stable. Having regular bowel movements. Denies urinary symptoms including dysuria or hematuria. Denies shortness of breath, chest pain, palpitations, dizziness, lightheadedness.     ALLERGIES:Vancomycin  PAST MEDICAL HISTORY:   Past Medical History:   Diagnosis Date    Cerebral infarction (H)     Hypertension      PAST SURGICAL HISTORY:   has no past surgical history on file.  FAMILY HISTORY: family history includes Hypertension in his mother.  SOCIAL HISTORY:  reports that he quit smoking about 10 years ago. He has never used smokeless tobacco. He reports current alcohol use. He reports current drug use. Frequency: 1.00 time per week. Drug: Marijuana.    Current Outpatient Medications   Medication Sig Dispense Refill    aspirin 81 MG EC tablet Take 81 mg by mouth daily      HYDROmorphone (DILAUDID) 2 MG tablet Take 2 mg by mouth every 8 hours.      HYDROmorphone, STANDARD CONC, (DILAUDID) 1 MG/ML oral solution Take 1 mg by mouth every 2 hours as needed for severe pain.      hyoscyamine (LEVSIN) 0.125 MG tablet Take 0.125 mg by mouth every 4 hours as needed (increased upper airway secretions).      melatonin 1 MG TABS tablet Take 6 mg by mouth at bedtime      mirtazapine (REMERON) 45 MG tablet Take 45 mg by mouth at bedtime      ondansetron (ZOFRAN) 4 MG tablet Take 4 mg by mouth every 6 hours as needed for nausea.       polyethylene glycol (MIRALAX) 17 g packet Take 1 packet by mouth daily      senna-docusate (SENOKOT-S/PERICOLACE) 8.6-50 MG tablet Take 1 tablet by mouth daily       No current facility-administered medications for this visit.      Case Management:  I have reviewed the care plan and MDS and do agree with the plan. Patient's desire to return to the community is not assessible due to cognitive impairment. Information reviewed:  Medications, vital signs, orders, and nursing notes.    ROS:  4 point ROS including Respiratory, CV, GI and , other than that noted in the HPI,  is negative    Vitals:  /78   Pulse 65   Temp 98.2  F (36.8  C)   Resp 14   Wt 61.1 kg (134 lb 12.8 oz)   SpO2 92%   BMI 19.34 kg/m    Body mass index is 19.34 kg/m .  Exam:  Vital signs:/78   Pulse 65   Temp 98.2  F (36.8  C)   Resp 14   Wt 61.1 kg (134 lb 12.8 oz)   SpO2 92%   BMI 19.34 kg/m     GENERAL APPEARANCE: Well developed, chronically ill, in no acute distress.  LUNGS: Course, nonlabored breathing  CARD: RRR, S1, S2, without murmurs, gallops, rubs, no JVD, peripheral pulses 2+ and symmetric  ABD: Soft, nondistended and nontender with normal bowel sounds.   MSK: Muscle strength and tone were equal bilaterally. Moves all extremities easily and intentionally.   EXTREMITIES: No cyanosis, clubbing or edema.  NEURO: Whispers yes/no, shakes head no, nods head yes   PSYCH: memory and judgement impaired at baseline     Lab/Diagnostic data:   Labs done in SNF are in [place] EPIC. Please refer to them using EPIC/Care Everywhere.    ASSESSMENT/PLAN  Hospice care patient  Dysphagia  History of CVA  Right hemiparesis  Anarthria and dysarthria  Failure to thrive, adult  Mass of middle lobe or right lung  Dysphagia worsened with virus. Declined feeding tube.   On hospice-see hospice meds in above med list  asa 81  Discontinue atorvastatin given hospice status      Recurrent major depressive disorder, remission status  unspecified  Chronic. Flat affect  Mirtazapine 45 mg daily  No changes today     Electronically signed by:  JOHANNA Leal CNP on 4/8/2025 at 4:13 PM

## 2025-04-08 NOTE — LETTER
4/8/2025      Matheus Nieves  [address]  [place] [address]        [facility] GERIATRICS  Chief Complaint   Patient presents with     group home Regulatory     [place] Medical Record Number:  [id number]  Place of Service where encounter took place:  [facility] () [[id number]]    HPI:    Matheus Nieves  is 70 year old (1955), who is being seen today for a federally mandated E/M visit. Today's concerns are: none    Mario is lying comfortably in bed. He is currently on hospice. Communicates with me today with getsures and whispers. He denies pain. He is happy to be able to eat and drink consistencies that he likes. Sleeping well. Mood stable. Having regular bowel movements. Denies urinary symptoms including dysuria or hematuria. Denies shortness of breath, chest pain, palpitations, dizziness, lightheadedness.     ALLERGIES:Vancomycin  PAST MEDICAL HISTORY:   Past Medical History:   Diagnosis Date     Cerebral infarction (H)      Hypertension      PAST SURGICAL HISTORY:   has no past surgical history on file.  FAMILY HISTORY: family history includes Hypertension in his mother.  SOCIAL HISTORY:  reports that he quit smoking about 10 years ago. He has never used smokeless tobacco. He reports current alcohol use. He reports current drug use. Frequency: 1.00 time per week. Drug: Marijuana.    Current Outpatient Medications   Medication Sig Dispense Refill     aspirin 81 MG EC tablet Take 81 mg by mouth daily       HYDROmorphone (DILAUDID) 2 MG tablet Take 2 mg by mouth every 8 hours.       HYDROmorphone, STANDARD CONC, (DILAUDID) 1 MG/ML oral solution Take 1 mg by mouth every 2 hours as needed for severe pain.       hyoscyamine (LEVSIN) 0.125 MG tablet Take 0.125 mg by mouth every 4 hours as needed (increased upper airway secretions).       melatonin 1 MG TABS tablet Take 6 mg by mouth at bedtime       mirtazapine (REMERON) 45 MG tablet Take 45 mg by mouth at bedtime       ondansetron  (ZOFRAN) 4 MG tablet Take 4 mg by mouth every 6 hours as needed for nausea.       polyethylene glycol (MIRALAX) 17 g packet Take 1 packet by mouth daily       senna-docusate (SENOKOT-S/PERICOLACE) 8.6-50 MG tablet Take 1 tablet by mouth daily       No current facility-administered medications for this visit.      Case Management:  I have reviewed the care plan and MDS and do agree with the plan. Patient's desire to return to the community is not assessible due to cognitive impairment. Information reviewed:  Medications, vital signs, orders, and nursing notes.    ROS:  4 point ROS including Respiratory, CV, GI and , other than that noted in the HPI,  is negative    Vitals:  /78   Pulse 65   Temp 98.2  F (36.8  C)   Resp 14   Wt 61.1 kg (134 lb 12.8 oz)   SpO2 92%   BMI 19.34 kg/m    Body mass index is 19.34 kg/m .  Exam:  Vital signs:/78   Pulse 65   Temp 98.2  F (36.8  C)   Resp 14   Wt 61.1 kg (134 lb 12.8 oz)   SpO2 92%   BMI 19.34 kg/m     GENERAL APPEARANCE: Well developed, chronically ill, in no acute distress.  LUNGS: Course, nonlabored breathing  CARD: RRR, S1, S2, without murmurs, gallops, rubs, no JVD, peripheral pulses 2+ and symmetric  ABD: Soft, nondistended and nontender with normal bowel sounds.   MSK: Muscle strength and tone were equal bilaterally. Moves all extremities easily and intentionally.   EXTREMITIES: No cyanosis, clubbing or edema.  NEURO: Whispers yes/no, shakes head no, nods head yes   PSYCH: memory and judgement impaired at baseline     Lab/Diagnostic data:   Labs done in SNF are in [place] EPIC. Please refer to them using EPIC/Care Everywhere.    ASSESSMENT/PLAN  Hospice care patient  Dysphagia  History of CVA  Right hemiparesis  Anarthria and dysarthria  Failure to thrive, adult  Mass of middle lobe or right lung  Dysphagia worsened with virus. Declined feeding tube.   On hospice-see hospice meds in above med list  asa 81  Discontinue atorvastatin given  hospice status      Recurrent major depressive disorder, remission status unspecified  Chronic. Flat affect  Mirtazapine 45 mg daily  No changes today     Electronically signed by:  JOHANNA Leal CNP on 4/8/2025 at 4:13 PM           Sincerely,        JOHANNA Leal CNP    Electronically signed

## 2025-06-23 ENCOUNTER — NURSING HOME VISIT (OUTPATIENT)
Dept: GERIATRICS | Facility: CLINIC | Age: 70
End: 2025-06-23
Payer: MEDICARE

## 2025-06-23 VITALS
BODY MASS INDEX: 20.22 KG/M2 | OXYGEN SATURATION: 92 % | RESPIRATION RATE: 16 BRPM | SYSTOLIC BLOOD PRESSURE: 112 MMHG | HEIGHT: 70 IN | DIASTOLIC BLOOD PRESSURE: 78 MMHG | HEART RATE: 61 BPM | WEIGHT: 141.2 LBS | TEMPERATURE: 98.7 F

## 2025-06-23 DIAGNOSIS — Z86.73 HISTORY OF CVA (CEREBROVASCULAR ACCIDENT): ICD-10-CM

## 2025-06-23 DIAGNOSIS — R13.10 DYSPHAGIA, UNSPECIFIED TYPE: ICD-10-CM

## 2025-06-23 DIAGNOSIS — I10 ESSENTIAL HYPERTENSION: ICD-10-CM

## 2025-06-23 DIAGNOSIS — Z51.5 HOSPICE CARE PATIENT: Primary | ICD-10-CM

## 2025-06-23 PROCEDURE — 99309 SBSQ NF CARE MODERATE MDM 30: CPT | Performed by: FAMILY MEDICINE

## 2025-06-23 NOTE — LETTER
6/23/2025      Matheus Nieves  [address]  [place] [address]        No notes on file      Sincerely,        Ashley Lorenzo MD    Electronically signed   worsened during this admission; needs thickened liquids per SLP. Multiple discussions with pt and family around tube feeding; pt ultimately declined. SLP ordered for LTC. If unable to liberalize diet and improve PO; will not have much time left. Community palliative referral placed.    Prior CVA  Hyperlipidemia  Cognitive communication disorder  -PTA aspirin, atorvastatin    Hypertension  -Was on amlodipine prior to admission per chart review, holding for now    Depression  Insomnia  -PTA mirtazapine, melatonin    Tobacco use disorder, in remission    Alcohol use disorder, in remission    Patient with moderate malnutrition in the context of acute illness/injury (on severe) during hospital stay and remains with moderate malnutrition in the context of acute illness/injury (on severe). Patient requiring additional resources due to degree of malnutrition during hospital stay. Dietitian following with the following nutrition therapy plan: 1) trial of boost and thrive BID 2) encourage small frequent meals 3) consider EN if within GOC.    Acute Hypokalemia: electrolyte replacement has been ordered.      PAST MEDICAL HISTORY:  Past Medical History:   Diagnosis Date     Cerebral infarction (H)      Hypertension        MEDICATIONS:  Most accurate list exists at facility.   Current Outpatient Medications   Medication Sig Dispense Refill     aspirin 81 MG EC tablet Take 81 mg by mouth daily       HYDROmorphone (DILAUDID) 2 MG tablet Take 2 mg by mouth every 8 hours.       HYDROmorphone, STANDARD CONC, (DILAUDID) 1 MG/ML oral solution Take 1 mg by mouth every 2 hours as needed for severe pain.       hyoscyamine (LEVSIN) 0.125 MG tablet Take 0.125 mg by mouth every 4 hours as needed (increased upper airway secretions).       melatonin 1 MG TABS tablet Take 6 mg by mouth at bedtime       mirtazapine (REMERON) 45 MG tablet Take 45 mg by mouth at bedtime       ondansetron (ZOFRAN) 4 MG tablet Take 4 mg by mouth every 6 hours as  "needed for nausea.       polyethylene glycol (MIRALAX) 17 g packet Take 1 packet by mouth daily       senna-docusate (SENOKOT-S/PERICOLACE) 8.6-50 MG tablet Take 1 tablet by mouth daily           PHYSICAL EXAM:   General: Patient is alert male, no distress.   Vitals: /78   Pulse 61   Temp 98.7  F (37.1  C)   Resp 16   Ht 1.778 m (5' 10\")   Wt 64 kg (141 lb 3.2 oz)   SpO2 92%   BMI 20.26 kg/m    HEENT: Head is NCAT. Eyes show no injection or icterus. Nares negative. Oropharynx moist.  Neck: No JVD.  Lungs: Non labored respirations.   : Deferred.  Extremities: No edema.   Musculoskeletal: Right sided weakness.   Skin: Warm and dry.   Psych: Difficult to assess. Mood appears at baseline, minimally interactive.       LABS/DIAGNOSTIC DATA:  Component      Latest Ref Rn 4/11/2023  11:15 AM 1/5/2024  5:55 AM 5/1/2024  5:49 AM   WBC      4.0 - 11.0 10e3/uL 4.3  6.9  7.9    RBC Count      4.40 - 5.90 10e6/uL 4.54  4.88  4.55    Hemoglobin      13.3 - 17.7 g/dL 13.9  14.8  14.3    Hematocrit      40.0 - 53.0 % 42.2  45.4  42.3    MCV      78 - 100 fL 93  93  93    MCH      26.5 - 33.0 pg 30.6  30.3  31.4    MCHC      31.5 - 36.5 g/dL 32.9  32.6  33.8    RDW      10.0 - 15.0 % 14.2  13.4  14.3    Platelet Count      150 - 450 10e3/uL 194  171  174      Component      Latest Ref Rn 4/11/2023  11:15 AM 1/5/2024  5:55 AM 5/1/2024  5:49 AM   Sodium      135 - 145 mmol/L 141  142  141    Anion Gap      7 - 15 mmol/L 7  12  11    Creatinine      0.67 - 1.17 mg/dL 0.82  0.86  0.89    Calcium      8.8 - 10.2 mg/dL 8.9  9.1  9.2    GFR Estimate      >60 mL/min/1.73m2 >90  >90  >90    Potassium      3.4 - 5.3 mmol/L  4.3  4.0    Chloride      98 - 107 mmol/L  108 (H)  106    Carbon Dioxide (CO2)      22 - 29 mmol/L  22  24    Urea Nitrogen      8.0 - 23.0 mg/dL  23.6 (H)  29.0 (H)    Glucose      70 - 99 mg/dL  80  69 (L)           ASSESSMENT/PLAN:  Hospice care patient. Signed on mid March 2025 after hospitalization " for sepsis. Decline and overall poor prognosis.   Hx of stroke. Cerebrovascular disease. On aspirin, statin. Right sided weakness.   Dysphagia. Altered textures though ok with thin liquids.   HTN. Not on BP meds, continue to monitor per routine.  Hx ETOH abuse. Resides in LTC, no concerns.       Electronically signed by: Ashley Lorenzo MD       Sincerely,        Ashley Lorenzo MD    Electronically signed

## 2025-06-29 NOTE — PROGRESS NOTES
[facility] GERIATRICS  Center Medical Record Number:  [id number]  Place of Service where encounter took place: [facility] () [[id number]]  CODE STATUS:   DNR / DNI    Chief Complaint/Reason for Visit:  Chief Complaint   Patient presents with    [facility] Regulatory     LTC 6/23/2025.        HPI:    Matheus Nieves is a 70 year old male who moved to [facility] from a different [facility] facility that he was admitted to after March 2023 hospitalization for lung abscess, concern for malignancy, aspiration, hx of prior stroke, needed placement, concern for vulnerable adult.       Today:  He is seen today for regulatory visit. Chart reviewed. He is minimally interactive at baseline, nods or shakes head but non verbal at visit. He is followed by Hospice now, signed on 3/14/2025 after hospitalization for sepsis as noted below. No acute concerns per nursing. Patient denies pain. Hospice following, managing cares and symptoms.    [facility] Medicine Discharge Summary  Admit date: 2/28/2025  Discharge date: 3/4/2025     Brief HPI Summary: 70 y.o. year-old male with history of prior CVA, essential hypertension, tobacco use disorder, anxiety, depression, insomnia, cognitive impairment, alcohol use disorder who was admitted on 2/28/2025 from long-term care with sepsis.     Hospital Course, by problem:   Severe sepsis with borderline septic shock, resolved  Bacteremia ruled out, likely contaminant  Septic encephalopathy, resolved  RSV infection: Patient presents with sepsis, severe hypotension requiring 5L IVF, likely profound dehydration related to RSV infection and baseline dysphagia. No e/o bacterial sepsis. Stabilized with IVF and supportive care.  - not high risk for dehydration given change to thickened liquids, as below. Will need encouragement for PO at LT.    Dysphagia: Baseline dysphagia 2/2 stroke worsened during this admission; needs thickened liquids per SLP. Multiple  discussions with pt and family around tube feeding; pt ultimately declined. SLP ordered for LTC. If unable to liberalize diet and improve PO; will not have much time left. Community palliative referral placed.    Prior CVA  Hyperlipidemia  Cognitive communication disorder  -PTA aspirin, atorvastatin    Hypertension  -Was on amlodipine prior to admission per chart review, holding for now    Depression  Insomnia  -PTA mirtazapine, melatonin    Tobacco use disorder, in remission    Alcohol use disorder, in remission    Patient with moderate malnutrition in the context of acute illness/injury (on severe) during hospital stay and remains with moderate malnutrition in the context of acute illness/injury (on severe). Patient requiring additional resources due to degree of malnutrition during hospital stay. Dietitian following with the following nutrition therapy plan: 1) trial of boost and thrive BID 2) encourage small frequent meals 3) consider EN if within GOC.    Acute Hypokalemia: electrolyte replacement has been ordered.      PAST MEDICAL HISTORY:  Past Medical History:   Diagnosis Date    Cerebral infarction (H)     Hypertension        MEDICATIONS:  Most accurate list exists at facility.   Current Outpatient Medications   Medication Sig Dispense Refill    aspirin 81 MG EC tablet Take 81 mg by mouth daily      HYDROmorphone (DILAUDID) 2 MG tablet Take 2 mg by mouth every 8 hours.      HYDROmorphone, STANDARD CONC, (DILAUDID) 1 MG/ML oral solution Take 1 mg by mouth every 2 hours as needed for severe pain.      hyoscyamine (LEVSIN) 0.125 MG tablet Take 0.125 mg by mouth every 4 hours as needed (increased upper airway secretions).      melatonin 1 MG TABS tablet Take 6 mg by mouth at bedtime      mirtazapine (REMERON) 45 MG tablet Take 45 mg by mouth at bedtime      ondansetron (ZOFRAN) 4 MG tablet Take 4 mg by mouth every 6 hours as needed for nausea.      polyethylene glycol (MIRALAX) 17 g packet Take 1 packet by  "mouth daily      senna-docusate (SENOKOT-S/PERICOLACE) 8.6-50 MG tablet Take 1 tablet by mouth daily           PHYSICAL EXAM:   General: Patient is alert male, no distress.   Vitals: /78   Pulse 61   Temp 98.7  F (37.1  C)   Resp 16   Ht 1.778 m (5' 10\")   Wt 64 kg (141 lb 3.2 oz)   SpO2 92%   BMI 20.26 kg/m    HEENT: Head is NCAT. Eyes show no injection or icterus. Nares negative. Oropharynx moist.  Neck: No JVD.  Lungs: Non labored respirations.   : Deferred.  Extremities: No edema.   Musculoskeletal: Right sided weakness.   Skin: Warm and dry.   Psych: Difficult to assess. Mood appears at baseline, minimally interactive.       LABS/DIAGNOSTIC DATA:  Component      Latest Ref [facility] 4/11/2023  11:15 AM 1/5/2024  5:55 AM 5/1/2024  5:49 AM   WBC      4.0 - 11.0 10e3/uL 4.3  6.9  7.9    RBC Count      4.40 - 5.90 10e6/uL 4.54  4.88  4.55    Hemoglobin      13.3 - 17.7 g/dL 13.9  14.8  14.3    Hematocrit      40.0 - 53.0 % 42.2  45.4  42.3    MCV      78 - 100 fL 93  93  93    MCH      26.5 - 33.0 pg 30.6  30.3  31.4    MCHC      31.5 - 36.5 g/dL 32.9  32.6  33.8    RDW      10.0 - 15.0 % 14.2  13.4  14.3    Platelet Count      150 - 450 10e3/uL 194  171  174      Component      Latest Ref [facility] 4/11/2023  11:15 AM 1/5/2024  5:55 AM 5/1/2024  5:49 AM   Sodium      135 - 145 mmol/L 141  142  141    Anion Gap      7 - 15 mmol/L 7  12  11    Creatinine      0.67 - 1.17 mg/dL 0.82  0.86  0.89    Calcium      8.8 - 10.2 mg/dL 8.9  9.1  9.2    GFR Estimate      >60 mL/min/1.73m2 >90  >90  >90    Potassium      3.4 - 5.3 mmol/L  4.3  4.0    Chloride      98 - 107 mmol/L  108 (H)  106    Carbon Dioxide (CO2)      22 - 29 mmol/L  22  24    Urea Nitrogen      8.0 - 23.0 mg/dL  23.6 (H)  29.0 (H)    Glucose      70 - 99 mg/dL  80  69 (L)           ASSESSMENT/PLAN:  Hospice care patient. Signed on mid March 2025 after hospitalization for sepsis. Decline and overall poor prognosis.   Hx of stroke. Cerebrovascular " disease. On aspirin, statin. Right sided weakness.   Dysphagia. Altered textures though ok with thin liquids.   HTN. Not on BP meds, continue to monitor per routine.  Hx ETOH abuse. Resides in LTC, no concerns.       Electronically signed by: Ashley Lorenzo MD

## 2025-08-05 ENCOUNTER — NURSING HOME VISIT (OUTPATIENT)
Dept: GERIATRICS | Facility: CLINIC | Age: 70
End: 2025-08-05
Payer: MEDICARE

## 2025-08-05 VITALS
BODY MASS INDEX: 20.39 KG/M2 | OXYGEN SATURATION: 98 % | RESPIRATION RATE: 16 BRPM | DIASTOLIC BLOOD PRESSURE: 80 MMHG | SYSTOLIC BLOOD PRESSURE: 121 MMHG | TEMPERATURE: 98.8 F | HEART RATE: 69 BPM | WEIGHT: 142.1 LBS

## 2025-08-05 DIAGNOSIS — F33.9 RECURRENT MAJOR DEPRESSIVE DISORDER, REMISSION STATUS UNSPECIFIED: ICD-10-CM

## 2025-08-05 DIAGNOSIS — G81.91 RIGHT HEMIPARESIS (H): ICD-10-CM

## 2025-08-05 DIAGNOSIS — R62.7 FAILURE TO THRIVE IN ADULT: ICD-10-CM

## 2025-08-05 DIAGNOSIS — Z86.73 HISTORY OF CVA (CEREBROVASCULAR ACCIDENT): ICD-10-CM

## 2025-08-05 DIAGNOSIS — R47.1 DYSARTHRIA AND ANARTHRIA: ICD-10-CM

## 2025-08-05 DIAGNOSIS — Z51.5 HOSPICE CARE PATIENT: Primary | ICD-10-CM

## 2025-08-05 DIAGNOSIS — R91.8 MASS OF MIDDLE LOBE OF RIGHT LUNG: ICD-10-CM

## 2025-08-05 DIAGNOSIS — R13.10 DYSPHAGIA, UNSPECIFIED TYPE: ICD-10-CM

## 2025-08-05 PROCEDURE — 99309 SBSQ NF CARE MODERATE MDM 30: CPT
